# Patient Record
Sex: FEMALE | Race: ASIAN | NOT HISPANIC OR LATINO
[De-identification: names, ages, dates, MRNs, and addresses within clinical notes are randomized per-mention and may not be internally consistent; named-entity substitution may affect disease eponyms.]

---

## 2018-04-10 PROBLEM — Z00.00 ENCOUNTER FOR PREVENTIVE HEALTH EXAMINATION: Status: ACTIVE | Noted: 2018-04-10

## 2018-05-08 ENCOUNTER — APPOINTMENT (OUTPATIENT)
Dept: OPHTHALMOLOGY | Facility: CLINIC | Age: 58
End: 2018-05-08
Payer: MEDICAID

## 2018-05-08 DIAGNOSIS — Z78.9 OTHER SPECIFIED HEALTH STATUS: ICD-10-CM

## 2018-05-08 DIAGNOSIS — E11.3293 TYPE 2 DIABETES MELLITUS WITH MILD NONPROLIFERATIVE DIABETIC RETINOPATHY WITHOUT MACULAR EDEMA, BILATERAL: ICD-10-CM

## 2018-05-08 DIAGNOSIS — H40.003 PREGLAUCOMA, UNSPECIFIED, BILATERAL: ICD-10-CM

## 2018-05-08 DIAGNOSIS — I10 ESSENTIAL (PRIMARY) HYPERTENSION: ICD-10-CM

## 2018-05-08 DIAGNOSIS — H04.123 DRY EYE SYNDROME OF BILATERAL LACRIMAL GLANDS: ICD-10-CM

## 2018-05-08 PROCEDURE — 92004 COMPRE OPH EXAM NEW PT 1/>: CPT

## 2018-05-08 PROCEDURE — 92250 FUNDUS PHOTOGRAPHY W/I&R: CPT

## 2018-07-26 ENCOUNTER — APPOINTMENT (OUTPATIENT)
Dept: OPHTHALMOLOGY | Facility: CLINIC | Age: 58
End: 2018-07-26

## 2018-08-21 ENCOUNTER — APPOINTMENT (OUTPATIENT)
Dept: OPHTHALMOLOGY | Facility: CLINIC | Age: 58
End: 2018-08-21

## 2019-03-28 ENCOUNTER — APPOINTMENT (OUTPATIENT)
Dept: OPHTHALMOLOGY | Facility: CLINIC | Age: 59
End: 2019-03-28

## 2019-03-29 ENCOUNTER — APPOINTMENT (OUTPATIENT)
Dept: NEUROLOGY | Facility: CLINIC | Age: 59
End: 2019-03-29

## 2020-03-24 ENCOUNTER — APPOINTMENT (OUTPATIENT)
Dept: OPHTHALMOLOGY | Facility: CLINIC | Age: 60
End: 2020-03-24

## 2020-06-11 ENCOUNTER — APPOINTMENT (OUTPATIENT)
Dept: NEUROLOGY | Facility: CLINIC | Age: 60
End: 2020-06-11

## 2020-08-27 ENCOUNTER — INPATIENT (INPATIENT)
Facility: HOSPITAL | Age: 60
LOS: 7 days | Discharge: HOME | End: 2020-09-04
Attending: HOSPITALIST | Admitting: HOSPITALIST
Payer: MEDICAID

## 2020-08-27 ENCOUNTER — TRANSCRIPTION ENCOUNTER (OUTPATIENT)
Age: 60
End: 2020-08-27

## 2020-08-27 VITALS
SYSTOLIC BLOOD PRESSURE: 184 MMHG | OXYGEN SATURATION: 100 % | RESPIRATION RATE: 14 BRPM | TEMPERATURE: 98 F | DIASTOLIC BLOOD PRESSURE: 79 MMHG | HEART RATE: 95 BPM

## 2020-08-27 DIAGNOSIS — L08.9 LOCAL INFECTION OF THE SKIN AND SUBCUTANEOUS TISSUE, UNSPECIFIED: ICD-10-CM

## 2020-08-27 DIAGNOSIS — E78.5 HYPERLIPIDEMIA, UNSPECIFIED: ICD-10-CM

## 2020-08-27 DIAGNOSIS — R63.8 OTHER SYMPTOMS AND SIGNS CONCERNING FOOD AND FLUID INTAKE: ICD-10-CM

## 2020-08-27 DIAGNOSIS — R00.0 TACHYCARDIA, UNSPECIFIED: ICD-10-CM

## 2020-08-27 DIAGNOSIS — I10 ESSENTIAL (PRIMARY) HYPERTENSION: ICD-10-CM

## 2020-08-27 DIAGNOSIS — E11.9 TYPE 2 DIABETES MELLITUS WITHOUT COMPLICATIONS: ICD-10-CM

## 2020-08-27 DIAGNOSIS — I73.9 PERIPHERAL VASCULAR DISEASE, UNSPECIFIED: ICD-10-CM

## 2020-08-27 DIAGNOSIS — Z29.9 ENCOUNTER FOR PROPHYLACTIC MEASURES, UNSPECIFIED: ICD-10-CM

## 2020-08-27 LAB
ALBUMIN SERPL ELPH-MCNC: 4.1 G/DL — SIGNIFICANT CHANGE UP (ref 3.3–5)
ALP SERPL-CCNC: 78 U/L — SIGNIFICANT CHANGE UP (ref 40–120)
ALT FLD-CCNC: 23 U/L — SIGNIFICANT CHANGE UP (ref 4–33)
ANION GAP SERPL CALC-SCNC: 13 MMO/L — SIGNIFICANT CHANGE UP (ref 7–14)
AST SERPL-CCNC: 20 U/L — SIGNIFICANT CHANGE UP (ref 4–32)
BASOPHILS # BLD AUTO: 0.06 K/UL — SIGNIFICANT CHANGE UP (ref 0–0.2)
BASOPHILS NFR BLD AUTO: 0.7 % — SIGNIFICANT CHANGE UP (ref 0–2)
BILIRUB SERPL-MCNC: 0.4 MG/DL — SIGNIFICANT CHANGE UP (ref 0.2–1.2)
BLD GP AB SCN SERPL QL: NEGATIVE — SIGNIFICANT CHANGE UP
BUN SERPL-MCNC: 13 MG/DL — SIGNIFICANT CHANGE UP (ref 7–23)
CALCIUM SERPL-MCNC: 9.2 MG/DL — SIGNIFICANT CHANGE UP (ref 8.4–10.5)
CHLORIDE SERPL-SCNC: 104 MMOL/L — SIGNIFICANT CHANGE UP (ref 98–107)
CHOLEST SERPL-MCNC: 118 MG/DL — LOW (ref 120–199)
CO2 SERPL-SCNC: 24 MMOL/L — SIGNIFICANT CHANGE UP (ref 22–31)
CREAT SERPL-MCNC: 0.74 MG/DL — SIGNIFICANT CHANGE UP (ref 0.5–1.3)
CRP SERPL HS-MCNC: 2.49 MG/L — SIGNIFICANT CHANGE UP
EOSINOPHIL # BLD AUTO: 0.2 K/UL — SIGNIFICANT CHANGE UP (ref 0–0.5)
EOSINOPHIL NFR BLD AUTO: 2.3 % — SIGNIFICANT CHANGE UP (ref 0–6)
ERYTHROCYTE [SEDIMENTATION RATE] IN BLOOD: 34 MM/HR — HIGH (ref 4–25)
GLUCOSE BLDC GLUCOMTR-MCNC: 178 MG/DL — HIGH (ref 70–99)
GLUCOSE BLDC GLUCOMTR-MCNC: 240 MG/DL — HIGH (ref 70–99)
GLUCOSE SERPL-MCNC: 211 MG/DL — HIGH (ref 70–99)
HBA1C BLD-MCNC: 9.5 % — HIGH (ref 4–5.6)
HCT VFR BLD CALC: 39.1 % — SIGNIFICANT CHANGE UP (ref 34.5–45)
HDLC SERPL-MCNC: 36 MG/DL — LOW (ref 45–65)
HGB BLD-MCNC: 13.3 G/DL — SIGNIFICANT CHANGE UP (ref 11.5–15.5)
IMM GRANULOCYTES NFR BLD AUTO: 0.5 % — SIGNIFICANT CHANGE UP (ref 0–1.5)
INR BLD: 1.13 — SIGNIFICANT CHANGE UP (ref 0.88–1.16)
LIPID PNL WITH DIRECT LDL SERPL: 65 MG/DL — SIGNIFICANT CHANGE UP
LYMPHOCYTES # BLD AUTO: 2.76 K/UL — SIGNIFICANT CHANGE UP (ref 1–3.3)
LYMPHOCYTES # BLD AUTO: 32 % — SIGNIFICANT CHANGE UP (ref 13–44)
MCHC RBC-ENTMCNC: 28.6 PG — SIGNIFICANT CHANGE UP (ref 27–34)
MCHC RBC-ENTMCNC: 34 % — SIGNIFICANT CHANGE UP (ref 32–36)
MCV RBC AUTO: 84.1 FL — SIGNIFICANT CHANGE UP (ref 80–100)
MONOCYTES # BLD AUTO: 0.55 K/UL — SIGNIFICANT CHANGE UP (ref 0–0.9)
MONOCYTES NFR BLD AUTO: 6.4 % — SIGNIFICANT CHANGE UP (ref 2–14)
NEUTROPHILS # BLD AUTO: 5.02 K/UL — SIGNIFICANT CHANGE UP (ref 1.8–7.4)
NEUTROPHILS NFR BLD AUTO: 58.1 % — SIGNIFICANT CHANGE UP (ref 43–77)
NRBC # FLD: 0 K/UL — SIGNIFICANT CHANGE UP (ref 0–0)
PLATELET # BLD AUTO: 318 K/UL — SIGNIFICANT CHANGE UP (ref 150–400)
PMV BLD: 9.9 FL — SIGNIFICANT CHANGE UP (ref 7–13)
POTASSIUM SERPL-MCNC: 3.5 MMOL/L — SIGNIFICANT CHANGE UP (ref 3.5–5.3)
POTASSIUM SERPL-SCNC: 3.5 MMOL/L — SIGNIFICANT CHANGE UP (ref 3.5–5.3)
PROT SERPL-MCNC: 7.8 G/DL — SIGNIFICANT CHANGE UP (ref 6–8.3)
PROTHROM AB SERPL-ACNC: 12.8 SEC — SIGNIFICANT CHANGE UP (ref 10.6–13.6)
RBC # BLD: 4.65 M/UL — SIGNIFICANT CHANGE UP (ref 3.8–5.2)
RBC # FLD: 13.2 % — SIGNIFICANT CHANGE UP (ref 10.3–14.5)
RH IG SCN BLD-IMP: POSITIVE — SIGNIFICANT CHANGE UP
SARS-COV-2 RNA SPEC QL NAA+PROBE: SIGNIFICANT CHANGE UP
SODIUM SERPL-SCNC: 141 MMOL/L — SIGNIFICANT CHANGE UP (ref 135–145)
TRIGL SERPL-MCNC: 156 MG/DL — HIGH (ref 10–149)
TSH SERPL-MCNC: 3.99 UIU/ML — SIGNIFICANT CHANGE UP (ref 0.27–4.2)
WBC # BLD: 8.63 K/UL — SIGNIFICANT CHANGE UP (ref 3.8–10.5)
WBC # FLD AUTO: 8.63 K/UL — SIGNIFICANT CHANGE UP (ref 3.8–10.5)

## 2020-08-27 PROCEDURE — 71045 X-RAY EXAM CHEST 1 VIEW: CPT | Mod: 26

## 2020-08-27 PROCEDURE — 73620 X-RAY EXAM OF FOOT: CPT | Mod: 26,LT

## 2020-08-27 PROCEDURE — 99284 EMERGENCY DEPT VISIT MOD MDM: CPT

## 2020-08-27 PROCEDURE — 99223 1ST HOSP IP/OBS HIGH 75: CPT

## 2020-08-27 RX ORDER — DEXTROSE 50 % IN WATER 50 %
25 SYRINGE (ML) INTRAVENOUS ONCE
Refills: 0 | Status: DISCONTINUED | OUTPATIENT
Start: 2020-08-27 | End: 2020-09-04

## 2020-08-27 RX ORDER — INSULIN GLARGINE 100 [IU]/ML
50 INJECTION, SOLUTION SUBCUTANEOUS AT BEDTIME
Refills: 0 | Status: DISCONTINUED | OUTPATIENT
Start: 2020-08-27 | End: 2020-08-31

## 2020-08-27 RX ORDER — GLUCAGON INJECTION, SOLUTION 0.5 MG/.1ML
1 INJECTION, SOLUTION SUBCUTANEOUS ONCE
Refills: 0 | Status: DISCONTINUED | OUTPATIENT
Start: 2020-08-27 | End: 2020-09-04

## 2020-08-27 RX ORDER — LISINOPRIL 2.5 MG/1
5 TABLET ORAL EVERY 24 HOURS
Refills: 0 | Status: DISCONTINUED | OUTPATIENT
Start: 2020-08-27 | End: 2020-08-27

## 2020-08-27 RX ORDER — ASPIRIN/CALCIUM CARB/MAGNESIUM 324 MG
1 TABLET ORAL
Qty: 0 | Refills: 0 | DISCHARGE

## 2020-08-27 RX ORDER — CLOPIDOGREL BISULFATE 75 MG/1
1 TABLET, FILM COATED ORAL
Qty: 0 | Refills: 0 | DISCHARGE

## 2020-08-27 RX ORDER — CLOPIDOGREL BISULFATE 75 MG/1
75 TABLET, FILM COATED ORAL DAILY
Refills: 0 | Status: DISCONTINUED | OUTPATIENT
Start: 2020-08-27 | End: 2020-09-04

## 2020-08-27 RX ORDER — ENOXAPARIN SODIUM 100 MG/ML
40 INJECTION SUBCUTANEOUS DAILY
Refills: 0 | Status: DISCONTINUED | OUTPATIENT
Start: 2020-08-27 | End: 2020-09-04

## 2020-08-27 RX ORDER — VANCOMYCIN HCL 1 G
1000 VIAL (EA) INTRAVENOUS EVERY 12 HOURS
Refills: 0 | Status: DISCONTINUED | OUTPATIENT
Start: 2020-08-28 | End: 2020-08-31

## 2020-08-27 RX ORDER — SIMVASTATIN 20 MG/1
1 TABLET, FILM COATED ORAL
Qty: 0 | Refills: 0 | DISCHARGE

## 2020-08-27 RX ORDER — CEFEPIME 1 G/1
1000 INJECTION, POWDER, FOR SOLUTION INTRAMUSCULAR; INTRAVENOUS ONCE
Refills: 0 | Status: COMPLETED | OUTPATIENT
Start: 2020-08-27 | End: 2020-08-27

## 2020-08-27 RX ORDER — SIMVASTATIN 20 MG/1
40 TABLET, FILM COATED ORAL AT BEDTIME
Refills: 0 | Status: DISCONTINUED | OUTPATIENT
Start: 2020-08-27 | End: 2020-08-31

## 2020-08-27 RX ORDER — DEXTROSE 50 % IN WATER 50 %
12.5 SYRINGE (ML) INTRAVENOUS ONCE
Refills: 0 | Status: DISCONTINUED | OUTPATIENT
Start: 2020-08-27 | End: 2020-09-04

## 2020-08-27 RX ORDER — LOSARTAN POTASSIUM 100 MG/1
100 TABLET, FILM COATED ORAL EVERY 24 HOURS
Refills: 0 | Status: DISCONTINUED | OUTPATIENT
Start: 2020-08-27 | End: 2020-09-04

## 2020-08-27 RX ORDER — COLLAGENASE CLOSTRIDIUM HIST. 250 UNIT/G
1 OINTMENT (GRAM) TOPICAL DAILY
Refills: 0 | Status: DISCONTINUED | OUTPATIENT
Start: 2020-08-27 | End: 2020-09-04

## 2020-08-27 RX ORDER — LOSARTAN POTASSIUM 100 MG/1
1 TABLET, FILM COATED ORAL
Qty: 0 | Refills: 0 | DISCHARGE

## 2020-08-27 RX ORDER — ACETAMINOPHEN 500 MG
650 TABLET ORAL EVERY 6 HOURS
Refills: 0 | Status: DISCONTINUED | OUTPATIENT
Start: 2020-08-27 | End: 2020-09-04

## 2020-08-27 RX ORDER — INSULIN LISPRO 100/ML
VIAL (ML) SUBCUTANEOUS
Refills: 0 | Status: DISCONTINUED | OUTPATIENT
Start: 2020-08-27 | End: 2020-08-30

## 2020-08-27 RX ORDER — AMLODIPINE BESYLATE 2.5 MG/1
2.5 TABLET ORAL DAILY
Refills: 0 | Status: DISCONTINUED | OUTPATIENT
Start: 2020-08-27 | End: 2020-08-27

## 2020-08-27 RX ORDER — AMLODIPINE BESYLATE 2.5 MG/1
2.5 TABLET ORAL EVERY 24 HOURS
Refills: 0 | Status: DISCONTINUED | OUTPATIENT
Start: 2020-08-27 | End: 2020-09-02

## 2020-08-27 RX ORDER — LOSARTAN POTASSIUM 100 MG/1
100 TABLET, FILM COATED ORAL DAILY
Refills: 0 | Status: DISCONTINUED | OUTPATIENT
Start: 2020-08-27 | End: 2020-08-27

## 2020-08-27 RX ORDER — VANCOMYCIN HCL 1 G
1000 VIAL (EA) INTRAVENOUS ONCE
Refills: 0 | Status: COMPLETED | OUTPATIENT
Start: 2020-08-27 | End: 2020-08-27

## 2020-08-27 RX ORDER — ASPIRIN/CALCIUM CARB/MAGNESIUM 324 MG
81 TABLET ORAL DAILY
Refills: 0 | Status: DISCONTINUED | OUTPATIENT
Start: 2020-08-27 | End: 2020-09-04

## 2020-08-27 RX ORDER — SODIUM CHLORIDE 9 MG/ML
1000 INJECTION, SOLUTION INTRAVENOUS
Refills: 0 | Status: DISCONTINUED | OUTPATIENT
Start: 2020-08-27 | End: 2020-09-04

## 2020-08-27 RX ORDER — INSULIN GLARGINE 100 [IU]/ML
50 INJECTION, SOLUTION SUBCUTANEOUS EVERY MORNING
Refills: 0 | Status: DISCONTINUED | OUTPATIENT
Start: 2020-08-28 | End: 2020-08-30

## 2020-08-27 RX ORDER — CEFEPIME 1 G/1
2000 INJECTION, POWDER, FOR SOLUTION INTRAMUSCULAR; INTRAVENOUS EVERY 8 HOURS
Refills: 0 | Status: DISCONTINUED | OUTPATIENT
Start: 2020-08-27 | End: 2020-08-31

## 2020-08-27 RX ORDER — DEXTROSE 50 % IN WATER 50 %
15 SYRINGE (ML) INTRAVENOUS ONCE
Refills: 0 | Status: DISCONTINUED | OUTPATIENT
Start: 2020-08-27 | End: 2020-09-04

## 2020-08-27 RX ADMIN — Medication 650 MILLIGRAM(S): at 23:24

## 2020-08-27 RX ADMIN — CEFEPIME 100 MILLIGRAM(S): 1 INJECTION, POWDER, FOR SOLUTION INTRAMUSCULAR; INTRAVENOUS at 23:24

## 2020-08-27 RX ADMIN — Medication 4: at 19:42

## 2020-08-27 RX ADMIN — AMLODIPINE BESYLATE 2.5 MILLIGRAM(S): 2.5 TABLET ORAL at 19:42

## 2020-08-27 RX ADMIN — INSULIN GLARGINE 50 UNIT(S): 100 INJECTION, SOLUTION SUBCUTANEOUS at 23:24

## 2020-08-27 RX ADMIN — SIMVASTATIN 40 MILLIGRAM(S): 20 TABLET, FILM COATED ORAL at 23:25

## 2020-08-27 RX ADMIN — CEFEPIME 100 MILLIGRAM(S): 1 INJECTION, POWDER, FOR SOLUTION INTRAMUSCULAR; INTRAVENOUS at 16:58

## 2020-08-27 RX ADMIN — LOSARTAN POTASSIUM 100 MILLIGRAM(S): 100 TABLET, FILM COATED ORAL at 19:41

## 2020-08-27 RX ADMIN — Medication 250 MILLIGRAM(S): at 18:03

## 2020-08-27 NOTE — ED PROVIDER NOTE - CLINICAL SUMMARY MEDICAL DECISION MAKING FREE TEXT BOX
60 y/o F hx of HTN, HLD, PAD, type 2 DM presents to the ED c/o worsening left second toe infection/pain. Will obtain labs, x-ray, COVID testing and admit to hospital for treatment.

## 2020-08-27 NOTE — H&P ADULT - PROBLEM SELECTOR PLAN 1
Ongoing L 2nd toe foot ulcer. Now with worsening pain and clear/blood tinged drainage. Afebrile. No leukcytosis. CRP negative. ESR 34. Xray with 2nd toe soft tissue swelling with focal ulceration at tip. In addition, eroded and indistinct underlying 2nd distal phalangeal tuft cortical margins consistent with radiographic changes of osteomyelitis. No tracking gas collections beyond this region and no additional focal areas of osteomyelitis. S/p vanc/cefepime in the ED.   - Podiatry assessed the patient, recommending:      - JONAH/PVR     - IV antibiotics      - Med clearance   - C/w vanc/cefepime for now   - Medical clearance pending: RCRI score 1 - class II risk, 6.0% risk of death, MI or cardiac arrest. Brown score 1.6%. Can complete <4 METS 2/2 pain. No chest pain or SOB with exertion. no prior surgeries in the past. Medical clearance pending EKG and echo given extensive vascular history.

## 2020-08-27 NOTE — CONSULT NOTE ADULT - SUBJECTIVE AND OBJECTIVE BOX
Podiatry pager #: [phone]/ [phone]    Patient is a 59y old  Female who presents with a chief complaint of     HPI:      PAST MEDICAL & SURGICAL HISTORY:  DM (diabetes mellitus), type 2  HLD (hyperlipidemia)  HTN (hypertension)  PAD (peripheral artery disease)  No significant past surgical history      MEDICATIONS  (STANDING):  vancomycin  IVPB. 1000 milliGRAM(s) IV Intermittent once    MEDICATIONS  (PRN):      Allergies    No Known Allergies    Intolerances        VITALS:    Vital Signs Last 24 Hrs  T(C): 36.7 (27 Aug 2020 14:11), Max: 36.7 (27 Aug 2020 14:11)  T(F): 98 (27 Aug 2020 14:11), Max: 98 (27 Aug 2020 14:11)  HR: 110 (27 Aug 2020 17:25) (95 - 110)  BP: 189/104 (27 Aug 2020 17:25) (184/79 - 189/104)  BP(mean): --  RR: 16 (27 Aug 2020 17:25) (14 - 16)  SpO2: 100% (27 Aug 2020 17:25) (100% - 100%)    LABS:                          13.3   8.63  )-----------( 318      ( 27 Aug 2020 15:40 )             39.1       08-27    141  |  104  |  13  ----------------------------<  211<H>  3.5   |  24  |  0.74    Ca    9.2      27 Aug 2020 15:40    TPro  7.8  /  Alb  4.1  /  TBili  0.4  /  DBili  x   /  AST  20  /  ALT  23  /  AlkPhos  78  08-27      CAPILLARY BLOOD GLUCOSE      POCT Blood Glucose.: 230 mg/dL (27 Aug 2020 14:18)      PT/INR - ( 27 Aug 2020 16:46 )   PT: 12.8 SEC;   INR: 1.13              LOWER EXTREMITY PHYSICAL EXAM:    Vascular: DP 1/4, PT NP, B/L,    LF second digit fibrotic wound to bone, w/ undermining, scant purulence noted. No periwound erythema, no malodor, etiology likely ischemic. Wound is tender to touch.     RADIOLOGY & ADDITIONAL STUDIES:    < from: Xray Foot AP + Lateral, Left (08.27.20 @ 15:32) >  EXAM:  RAD FOOT 2 VIEWS LEFT        PROCEDURE DATE:  Aug 27 2020         INTERPRETATION:  CLINICAL INDICATION: left 2nd toe infection    EXAM:  Frontal and lateral left foot from 8/27/2020 at 1532. No similar prior studies available for comparison.    IMPRESSION:  2nd toe soft tissue swelling with focal ulceration at tip. In addition, eroded and indistinct underlying 2nd distal phalangeal tuft cortical margins consistent with radiographic changes of osteomyelitis. No tracking gas collections beyond this region and no additional focal areas of osteomyelitis.    No fractures or dislocations.    Tarsometatarsal alignment maintained without evidence for a Lisfranc injury.    Preserved joint spaces and no joint margin erosions.    Osteopenic appearing osseous structures, however, bone mineralization more accurately assessed with densitometry. Otherwise no discrete suspicious lytic or blastic lesions.                BOBBY OLIVARES M.D., ATTENDING RADIOLOGIST  This document has been electronically signed. Aug 27 2020  4:29PM              < end of copied text >

## 2020-08-27 NOTE — PATIENT PROFILE ADULT - TELEPHONIC ID NUMBER OF THE INTERPRETER
Ambulated to discharge area without difficulty. Strong on transfer and denied complaint.
Discussed with her the bradycardia she presents with at this visit. She recently had a coronary stent placed with her cardiologist. Olu Thayerggy her to contact her doctor and let him know her heart rate is consistently between 47- 52 during her visit here today. She will ask if any further consideration or medication adjustment will be necessary.
Patient arrived in Post op phase 2 on cart. Report from Laura Martin RN. Site of injection without redness, drainage or bleeding. Patient denies numbness, tingling or weakness. Moves extremities x 4.
[id number]

## 2020-08-27 NOTE — H&P ADULT - NSHPPHYSICALEXAM_GEN_ALL_CORE
.  VITAL SIGNS:  T(C): 36.8 (08-27-20 @ 17:25), Max: 36.8 (08-27-20 @ 17:25)  T(F): 98.3 (08-27-20 @ 17:25), Max: 98.3 (08-27-20 @ 17:25)  HR: 110 (08-27-20 @ 17:25) (95 - 110)  BP: 189/104 (08-27-20 @ 17:25) (184/79 - 189/104)  BP(mean): --  RR: 16 (08-27-20 @ 17:25) (14 - 16)  SpO2: 100% (08-27-20 @ 17:25) (100% - 100%)  Wt(kg): --    PHYSICAL EXAM:    Constitutional: WDWN female resting comfortably in bed; NAD  Head: NC/AT  Eyes: PERRL, EOMI, anicteric sclera  ENT: no nasal discharge; uvula midline, no oropharyngeal erythema or exudates; MMM  Neck: supple; no JVD or thyromegaly  Respiratory: CTA B/L; no W/R/R, no retractions  Cardiac: +S1/S2 +S4; tachycardic regular rhythm; no M/R/G; PMI non-displaced  Gastrointestinal: abdomen soft, NT/ND; no rebound or guarding; +BSx4  Back: spine midline, no bony tenderness or step-offs; no CVAT B/L  Extremities: WWP, no clubbing or cyanosis; no peripheral edema  Musculoskeletal: NROM x4; no joint swelling, tenderness or erythema  Vascular: DP pulses diminished bilaterally. Left foot/toe wrapped. No purulence noted on the gauze  Dermatologic: skin warm, dry and intact; no rashes, wounds, or scars  Lymphatic: no submandibular or cervical LAD  Neurologic: AAOx3; CNII-XII grossly intact; no focal deficits  Psychiatric: affect and characteristics of appearance, verbalizations, behaviors are appropriate

## 2020-08-27 NOTE — H&P ADULT - PROBLEM SELECTOR PLAN 3
Hx of DM. A1C unknown. On Lantus 100U Qhs and metformin 1000mg BID at home.   - Hold metformin   - Lantus 50U BID   - F/u A1C

## 2020-08-27 NOTE — CONSULT NOTE ADULT - ASSESSMENT
Pt is 60 yo who presents w/ LF second digit wound to bone  -pt was seen and evaluated   -Afebrile, no leukocytosis  -LF second digit fibrotic wound to bone, w/ undermining, scant purulence noted. No periwound erythema, no malodor, etiology likely ischemic. Wound is tender to touch.   -Xray: OM to distal phallanx of left second digit  -Rec admit for IV abx  -JONAH/PVR ordered, vasc consult recommended pending JONAH/PVR  -Please document medical clearance/ optimization for LF surgery under local and light sedation  -Discussed w/ attending

## 2020-08-27 NOTE — ED PROVIDER NOTE - ATTENDING CONTRIBUTION TO CARE
I performed a face to face evaluation of this patient and obtained a history and performed a full exam.  I agree with the history, physical exam and plan of the PA.    Brief HPI:  60 y/o F hx of HTN, HLD, PAD, type 2 DM presents to the ED c/o worsening left second toe infection/pain.    Vitals:   Reviewed    Exam:    GEN:  Non-toxic appearing, non-distressed, speaking full sentences, non-diaphoretic, AAOx3  HEENT:  NCAT, neck supple, EOMI, PERRLA, sclera anicteric, no conjunctival pallor or injection, no stridor, normal voice, no tonsillar exudate, uvula midline, tympanic membranes and external auditory canals normal appearing bilaterally   CV:  regular rhythm and rate, s1/s2 audible, no murmurs, rubs or gallops, peripheral pulses 2+ and symmetric  PULM:  non-labored respirations, lungs clear to auscultation bilaterally, no wheezes, crackles or rales  ABD:  non distended, non-tender, no rebound, no guarding, negative Choi's sign, bowel sounds normal, no cvat  MSK:  swollen warm/ecchymotic left second distal phalanges of toe, purulent drainage and erosion of nailbed  NEURO:  AAOx3, CN II-XII intact, motor 5/5 in upper and lower extremities bilaterally, sensation grossly intact in extremities and trunk, finger to nose testing wnl, no nystagmus, negative Romberg, no pronator drift, no gait deficit  SKIN:  warm, dry, no rash or vesicles     A/P:  60 y/o F hx of HTN, HLD, PAD, type 2 DM presents to the ED c/o worsening left second toe infection/pain.  VSS AF.  Exam not c/w necrotizing infection.  Will send labs, abx, admit.

## 2020-08-27 NOTE — H&P ADULT - NSICDXPASTMEDICALHX_GEN_ALL_CORE_FT
PAST MEDICAL HISTORY:  DM (diabetes mellitus), type 2     HLD (hyperlipidemia)     HTN (hypertension)     PAD (peripheral artery disease)

## 2020-08-27 NOTE — ED ADULT TRIAGE NOTE - CHIEF COMPLAINT QUOTE
Pt presents to ED for pain to 2nd toe on left foot x2days. 2nd toe observed to be wrapped at this time, as per daughter pt has a wound to this toe and has been seeing a podiatrist for antibiotic treatment. Dressing observed to be dry and intact at this. Pt unable to sleep and walk well on foot due to pain. Pmhx of DM, HTN & high cholesterol.

## 2020-08-27 NOTE — ED ADULT NURSE NOTE - OBJECTIVE STATEMENT
Pt presenting to intake AxOx4 ambulatory at baseline with c.o pain to second toe of left foot. PMH of DM. Pt recently received abx for toe infection, symptoms not improving. RR even and unlabored. Palor/diaphoresis not noted. IV established with 20g in LAC. Labs drawn and sent. MD at bedside, will continue to monitor.

## 2020-08-27 NOTE — H&P ADULT - HISTORY OF PRESENT ILLNESS
60 y/o F with PMH HTN, HLD, DM2, PAD s/p 2 stents in left leg with left 2nd toe ulcer who presents for worsening left 2nd toe pain. History is obtained from the patient with an  and from the daughter. She recently had 2 stents placed in the left leg in April. She had an ulcer of the left toe that had not caused her pain but in the past month or so she has had increasing left toe pain. She visited a doctor weekly that examined the toe and did xrays. The family said the xray did not show anything. She now has worsening pain. Has had some clear drainage from the toe and small streaks of blood. No purulent or malodorous fluid. Denies numbness/tingling, weakness of the bilateral lower extremities. No fevers/chills, fatigue. All other ROS negative.     In the ED, VS : SBP 180s, . No EKG performed. No medication administered. Xray of left foot showing possible osteo of the left 2nd toe. Received vanc/zosyn. Podiatry consulted. 58 y/o F with PMH HTN, HLD, DM2, PAD s/p 2 stents in left leg with left 2nd toe ulcer who presents for worsening left 2nd toe pain. History is obtained from the patient with an  and from the daughter. She recently had 2 stents placed in the left leg in April. She had an ulcer of the left toe that had not caused her pain but in the past month or so she has had increasing left toe pain. She visited a doctor weekly that examined the toe and did xrays. The family said the xray did not show anything. She now has worsening pain. Has had some clear drainage from the toe and small streaks of blood. No purulent or malodorous fluid. Denies numbness/tingling, weakness of the bilateral lower extremities. No fevers/chills, fatigue. All other ROS negative.     In the ED, VS : SBP 180s, . No EKG performed. Xray of left foot showing possible osteo of the left 2nd toe. Received vanc/cefepime. Podiatry consulted.

## 2020-08-27 NOTE — H&P ADULT - NSHPLABSRESULTS_GEN_ALL_CORE
.  LABS:                         13.3   8.63  )-----------( 318      ( 27 Aug 2020 15:40 )             39.1     08-27    141  |  104  |  13  ----------------------------<  211<H>  3.5   |  24  |  0.74    Ca    9.2      27 Aug 2020 15:40    TPro  7.8  /  Alb  4.1  /  TBili  0.4  /  DBili  x   /  AST  20  /  ALT  23  /  AlkPhos  78  08-27    PT/INR - ( 27 Aug 2020 16:46 )   PT: 12.8 SEC;   INR: 1.13                        RADIOLOGY, EKG & ADDITIONAL TESTS: Reviewed.

## 2020-08-27 NOTE — H&P ADULT - PROBLEM SELECTOR PLAN 4
Hx of HTN. SBP 180s currently. S4 heard on cardiac exam. Not on an ACE inhibitor.   - C/w norvasc 2.5mg qd  - Start lisinopril 5mg qd

## 2020-08-27 NOTE — H&P ADULT - PROBLEM SELECTOR PLAN 6
HR elevated to 100s while interviewing the patient. In the ED was 80s-90s. No EKG in the chart.   - F/u EKG

## 2020-08-27 NOTE — ED PROVIDER NOTE - OBJECTIVE STATEMENT
58 y/o F hx of HTN, HLD, PAD, type 2 DM presents to the ED c/o worsening left second toe infection/pain. Pt has ongoing infection/issue to left second toe for 6 months. Pt saw podiatry and was sent to vascular surgery. Placed 3 stents in left lower extremity in April. Taking Asprin. Chronic wound to distal aspect of left toe. Went to Urgent Care one week ago and started on antibiotics. Pt unable to recall name of antibiotics but has been taking it for one week with worsening wound dehiscence and swelling of toe. Denies fever, chills, chest pain, SOB, cough.

## 2020-08-27 NOTE — ED PROVIDER NOTE - SKIN WOUND DESCRIPTION
swollen warm/ecchymotic left second distal phalanges of toe, purulent drainage and erosion of nailbed

## 2020-08-27 NOTE — H&P ADULT - ASSESSMENT
60 y/o F with PMH HTN, HLD, DM2, PAD s/p 2 stents in left leg with left 2nd toe ulcer who presents for worsening left 2nd toe pain with likely osteomyelitis.

## 2020-08-27 NOTE — ED PROVIDER NOTE - PMH
DM (diabetes mellitus), type 2    HLD (hyperlipidemia)    HTN (hypertension)    PAD (peripheral artery disease) 5

## 2020-08-28 LAB
GLUCOSE BLDC GLUCOMTR-MCNC: 131 MG/DL — HIGH (ref 70–99)
GLUCOSE BLDC GLUCOMTR-MCNC: 153 MG/DL — HIGH (ref 70–99)
GLUCOSE BLDC GLUCOMTR-MCNC: 165 MG/DL — HIGH (ref 70–99)
GLUCOSE BLDC GLUCOMTR-MCNC: 226 MG/DL — HIGH (ref 70–99)
GLUCOSE BLDC GLUCOMTR-MCNC: 243 MG/DL — HIGH (ref 70–99)
HBA1C BLD-MCNC: 9.6 % — HIGH (ref 4–5.6)
HCV AB S/CO SERPL IA: 0.16 S/CO — SIGNIFICANT CHANGE UP (ref 0–0.99)
HCV AB SERPL-IMP: SIGNIFICANT CHANGE UP

## 2020-08-28 PROCEDURE — 93010 ELECTROCARDIOGRAM REPORT: CPT

## 2020-08-28 PROCEDURE — 99253 IP/OBS CNSLTJ NEW/EST LOW 45: CPT

## 2020-08-28 PROCEDURE — 93306 TTE W/DOPPLER COMPLETE: CPT | Mod: 26

## 2020-08-28 PROCEDURE — 93923 UPR/LXTR ART STDY 3+ LVLS: CPT | Mod: 26

## 2020-08-28 PROCEDURE — 99233 SBSQ HOSP IP/OBS HIGH 50: CPT

## 2020-08-28 RX ADMIN — Medication 1 APPLICATION(S): at 09:00

## 2020-08-28 RX ADMIN — Medication 250 MILLIGRAM(S): at 18:02

## 2020-08-28 RX ADMIN — SIMVASTATIN 40 MILLIGRAM(S): 20 TABLET, FILM COATED ORAL at 21:06

## 2020-08-28 RX ADMIN — CEFEPIME 100 MILLIGRAM(S): 1 INJECTION, POWDER, FOR SOLUTION INTRAMUSCULAR; INTRAVENOUS at 21:06

## 2020-08-28 RX ADMIN — CLOPIDOGREL BISULFATE 75 MILLIGRAM(S): 75 TABLET, FILM COATED ORAL at 13:35

## 2020-08-28 RX ADMIN — Medication 4: at 17:52

## 2020-08-28 RX ADMIN — INSULIN GLARGINE 50 UNIT(S): 100 INJECTION, SOLUTION SUBCUTANEOUS at 22:46

## 2020-08-28 RX ADMIN — CEFEPIME 100 MILLIGRAM(S): 1 INJECTION, POWDER, FOR SOLUTION INTRAMUSCULAR; INTRAVENOUS at 05:00

## 2020-08-28 RX ADMIN — AMLODIPINE BESYLATE 2.5 MILLIGRAM(S): 2.5 TABLET ORAL at 18:03

## 2020-08-28 RX ADMIN — INSULIN GLARGINE 50 UNIT(S): 100 INJECTION, SOLUTION SUBCUTANEOUS at 09:22

## 2020-08-28 RX ADMIN — Medication 2: at 12:32

## 2020-08-28 RX ADMIN — Medication 650 MILLIGRAM(S): at 00:15

## 2020-08-28 RX ADMIN — CEFEPIME 100 MILLIGRAM(S): 1 INJECTION, POWDER, FOR SOLUTION INTRAMUSCULAR; INTRAVENOUS at 13:35

## 2020-08-28 RX ADMIN — Medication 2: at 09:21

## 2020-08-28 RX ADMIN — Medication 250 MILLIGRAM(S): at 05:00

## 2020-08-28 RX ADMIN — Medication 81 MILLIGRAM(S): at 13:35

## 2020-08-28 RX ADMIN — LOSARTAN POTASSIUM 100 MILLIGRAM(S): 100 TABLET, FILM COATED ORAL at 18:02

## 2020-08-28 RX ADMIN — ENOXAPARIN SODIUM 40 MILLIGRAM(S): 100 INJECTION SUBCUTANEOUS at 13:35

## 2020-08-28 NOTE — PROGRESS NOTE ADULT - SUBJECTIVE AND OBJECTIVE BOX
Podiatry pager #: [phone] ([place])/ [phone] ([facility])    Patient is a 59y old  Female who presents with a chief complaint of      INTERVAL HPI/OVERNIGHT EVENTS:  Patient seen and evaluated at bedside.  Pt is resting comfortable in NAD. Denies N/V/F/C.     Allergies    No Known Allergies    Intolerances        Vital Signs Last 24 Hrs  T(C): 36.4 (28 Aug 2020 04:55), Max: 36.8 (27 Aug 2020 17:25)  T(F): 97.6 (28 Aug 2020 04:55), Max: 98.3 (27 Aug 2020 17:25)  HR: 90 (28 Aug 2020 04:55) (86 - 110)  BP: 160/89 (28 Aug 2020 04:55) (156/84 - 189/104)  BP(mean): --  RR: 18 (28 Aug 2020 04:55) (14 - 18)  SpO2: 100% (28 Aug 2020 04:55) (100% - 100%)    LABS:                        13.3   8.63  )-----------( 318      ( 27 Aug 2020 15:40 )             39.1     08-27    141  |  104  |  13  ----------------------------<  211<H>  3.5   |  24  |  0.74    Ca    9.2      27 Aug 2020 15:40    TPro  7.8  /  Alb  4.1  /  TBili  0.4  /  DBili  x   /  AST  20  /  ALT  23  /  AlkPhos  78  08-27    PT/INR - ( 27 Aug 2020 16:46 )   PT: 12.8 SEC;   INR: 1.13              CAPILLARY BLOOD GLUCOSE      POCT Blood Glucose.: 131 mg/dL (28 Aug 2020 02:15)  POCT Blood Glucose.: 178 mg/dL (27 Aug 2020 23:01)  POCT Blood Glucose.: 240 mg/dL (27 Aug 2020 19:14)  POCT Blood Glucose.: 230 mg/dL (27 Aug 2020 14:18)      Lower Extremity Physical Exam:  Vascular: DP 1/4, PT NP, B/L,  Neuro: Epicritic sensation intact to the level of the digits b/l   MSk: unremarkable  LF second digit fibronecrotic wound to bone, w/ undermining, scant purulence noted. No periwound erythema, no malodor, etiology likely ischemic. Wound is tender to touch.     RADIOLOGY & ADDITIONAL TESTS:  < from: Xray Foot AP + Lateral, Left (08.27.20 @ 15:32) >    EXAM:  RAD FOOT 2 VIEWS LEFT        PROCEDURE DATE:  Aug 27 2020         INTERPRETATION:  CLINICAL INDICATION: left 2nd toe infection    EXAM:  Frontal and lateral left foot from 8/27/2020 at 1532. No similar prior studies available for comparison.    IMPRESSION:  2nd toe soft tissue swelling with focal ulceration at tip. In addition, eroded and indistinct underlying 2nd distal phalangeal tuft cortical margins consistent with radiographic changes of osteomyelitis. No tracking gas collections beyond this region and no additional focal areas of osteomyelitis.    No fractures or dislocations.    Tarsometatarsal alignment maintained without evidence for a Lisfranc injury.    Preserved joint spaces and no joint margin erosions.    Osteopenic appearing osseous structures, however, bone mineralization more accurately assessed with densitometry. Otherwise no discrete suspicious lytic or blastic lesions.                BOBBY OLIVARES M.D., ATTENDING RADIOLOGIST  This document has been electronically signed. Aug 27 2020  4:29PM    < end of copied text >

## 2020-08-28 NOTE — CONSULT NOTE ADULT - ASSESSMENT
ASSESSMENT:  58 y/o F with PMH HTN, HLD, DM2, PAD s/p 2 stents in left leg (Angioplasty performed with Dr. Baker in [facility] in April 2020) with left 2nd toe ulcer who presents for worsening left 2nd toe pain. Foot xray with concerns for osteomyelitis. She now has JONAH/PVR that demonstrate left toe pressure of 55 and JONAH of 0.61. She had angioplasty in April 2020 at [facility] but wound worsened and was more painful starting in June 2020. We are consulted for possible need for LLE revascularization.    PLAN:    - To be discussed with Vascular Fellow and Dr. Forman    [id number] ASSESSMENT:  58 y/o F with PMH HTN, HLD, DM2, PAD s/p 2 stents in left leg (Angioplasty performed with Dr. Baker in [facility] in April 2020) with left 2nd toe ulcer who presents for worsening left 2nd toe pain. Foot xray with concerns for osteomyelitis. She now has JONAH/PVR that demonstrate left toe pressure of 55 and JONAH of 0.61. She had angioplasty in April 2020 at [facility] but wound worsened and was more painful starting in June 2020. We are consulted for possible need for LLE revascularization.    PLAN:    - Discussed with Vascular Fellow and Dr. Forman  - Please obtain angiogram records from [facility] with Dr. Eduardo Baker  - Will determine need for repeat angiogram after reviewing records  - Toe amputation planning per podiatry   - Will continue to follow     [id number]

## 2020-08-28 NOTE — PROGRESS NOTE ADULT - PROBLEM SELECTOR PLAN 6
HR elevated to 100s while interviewing the patient. In the ED was 80s-90s. No EKG in the chart.   - F/u EKG improved    In the ED was 80s-90s. No EKG in the chart.   - F/u EKG

## 2020-08-28 NOTE — CONSULT NOTE ADULT - SUBJECTIVE AND OBJECTIVE BOX
General Surgery Consult Note  Attending: Dr. Forman  Service: C Team Surgery    HPI:  60 y/o F with PMH HTN, HLD, DM2, PAD s/p 2 stents in left leg with left 2nd toe ulcer who presents for worsening left 2nd toe pain. History is obtained from the patient with an  and from the daughter. She recently had 2 stents placed in the left leg in April. She had an ulcer of the left toe that had not caused her pain but in the past month or so she has had increasing left toe pain. She visited a doctor weekly that examined the toe and did xrays. The family said the xray did not show anything. She now has worsening pain. Has had some clear drainage from the toe and small streaks of blood. No purulent or malodorous fluid. Denies numbness/tingling, weakness of the bilateral lower extremities. No fevers/chills, fatigue. All other ROS negative. In the ED, VS : SBP 180s, . No EKG performed. Xray of left foot showing possible osteo of the left 2nd toe. Received vanc/cefepime. Podiatry consulted. Vascular surgery now consult for abnormal JONAH/PVRs.          PAST MEDICAL & SURGICAL HISTORY:  DM (diabetes mellitus), type 2  HLD (hyperlipidemia)  HTN (hypertension)  PAD (peripheral artery disease)  No significant past surgical history      ALLERGIES:  Allergies    No Known Allergies    Intolerances        SOCIAL HISTORY:      FAMILY HISTORY:      PHYSICAL EXAM:  General: NAD, resting comfortably  HEENT: NC/AT, EOMI, normal hearing, no oral lesions, no LAD, neck supple  Pulmonary: normal resp effort, CTA-B  Cardiovascular: NSR, no murmurs  Abdominal: soft, ND/NT, no organomegaly  Extremities: WWP, normal strength, no clubbing/cyanosis/edema  Neuro: A/O x 3, CNs II-XII grossly intact, normal sensation, no focal deficits  Pulses: palpable distal pulses    VITAL SIGNS:  Vital Signs Last 24 Hrs  T(C): 36.9 (28 Aug 2020 13:32), Max: 36.9 (28 Aug 2020 13:32)  T(F): 98.4 (28 Aug 2020 13:32), Max: 98.4 (28 Aug 2020 13:32)  HR: 94 (28 Aug 2020 13:32) (86 - 100)  BP: 142/84 (28 Aug 2020 13:32) (142/84 - 164/75)  BP(mean): --  RR: 18 (28 Aug 2020 13:32) (17 - 18)  SpO2: 99% (28 Aug 2020 13:32) (99% - 100%)    I&O's Summary      LABS:                        13.3   8.63  )-----------( 318      ( 27 Aug 2020 15:40 )             39.1     08-27    141  |  104  |  13  ----------------------------<  211<H>  3.5   |  24  |  0.74    Ca    9.2      27 Aug 2020 15:40    TPro  7.8  /  Alb  4.1  /  TBili  0.4  /  DBili  x   /  AST  20  /  ALT  23  /  AlkPhos  78  08-27    PT/INR - ( 27 Aug 2020 16:46 )   PT: 12.8 SEC;   INR: 1.13              CAPILLARY BLOOD GLUCOSE      POCT Blood Glucose.: 243 mg/dL (28 Aug 2020 17:01)  POCT Blood Glucose.: 165 mg/dL (28 Aug 2020 12:09)  POCT Blood Glucose.: 153 mg/dL (28 Aug 2020 08:29)  POCT Blood Glucose.: 131 mg/dL (28 Aug 2020 02:15)  POCT Blood Glucose.: 178 mg/dL (27 Aug 2020 23:01)  POCT Blood Glucose.: 240 mg/dL (27 Aug 2020 19:14)    LIVER FUNCTIONS - ( 27 Aug 2020 15:40 )  Alb: 4.1 g/dL / Pro: 7.8 g/dL / ALK PHOS: 78 u/L / ALT: 23 u/L / AST: 20 u/L / GGT: x             CULTURES:      RADIOLOGY & ADDITIONAL STUDIES: General Surgery Consult Note  Attending: Dr. Forman  Service: C Team Surgery    HPI:  58 y/o F with PMH HTN, HLD, DM2, PAD s/p 2 stents in left leg (Angioplasty performed with Dr. Baker in [facility] in April 2020) with left 2nd toe ulcer who presents for worsening left 2nd toe pain. History is obtained from the patient with an  and from the daughter. She recently had 2 stents placed in the left leg in April. She had an ulcer of the left toe that had not caused her pain but in the past month or so she has had increasing left toe pain. She visited a doctor weekly that examined the toe and did xrays. The family said the xray did not show anything. She now has worsening pain. Has had some clear drainage from the toe and small streaks of blood. No purulent or malodorous fluid. Denies numbness/tingling, weakness of the bilateral lower extremities. No fevers/chills, fatigue. All other ROS negative. In the ED, VS : SBP 180s, . No EKG performed. Xray of left foot showing possible osteo of the left 2nd toe. Received vanc/cefepime. Podiatry consulted. Vascular surgery now consult for abnormal JONAH/PVRs.          PAST MEDICAL & SURGICAL HISTORY:  DM (diabetes mellitus), type 2  HLD (hyperlipidemia)  HTN (hypertension)  PAD (peripheral artery disease)  No significant past surgical history      ALLERGIES:  Allergies    No Known Allergies    Intolerances        SOCIAL HISTORY:  Denies Tobacco, ETOH    FAMILY HISTORY:      PHYSICAL EXAM:  General: NAD  HEENT: NC/AT, no scleral icterus  Pulmonary: normal resp effort, CTA-B  Cardiovascular: NSR, no murmurs  Abdominal: soft, ND/NT  Extremities: WWP, no edema  Neuro: A/O x 3, normal sensation, no focal deficits  Pulses: Palpable femoral pulses bilaterally, dopplerable PT/DP bilaterally     VITAL SIGNS:  Vital Signs Last 24 Hrs  T(C): 36.9 (28 Aug 2020 13:32), Max: 36.9 (28 Aug 2020 13:32)  T(F): 98.4 (28 Aug 2020 13:32), Max: 98.4 (28 Aug 2020 13:32)  HR: 94 (28 Aug 2020 13:32) (86 - 100)  BP: 142/84 (28 Aug 2020 13:32) (142/84 - 164/75)  BP(mean): --  RR: 18 (28 Aug 2020 13:32) (17 - 18)  SpO2: 99% (28 Aug 2020 13:32) (99% - 100%)    I&O's Summary      LABS:                        13.3   8.63  )-----------( 318      ( 27 Aug 2020 15:40 )             39.1     08-27    141  |  104  |  13  ----------------------------<  211<H>  3.5   |  24  |  0.74    Ca    9.2      27 Aug 2020 15:40    TPro  7.8  /  Alb  4.1  /  TBili  0.4  /  DBili  x   /  AST  20  /  ALT  23  /  AlkPhos  78  08-27    PT/INR - ( 27 Aug 2020 16:46 )   PT: 12.8 SEC;   INR: 1.13              CAPILLARY BLOOD GLUCOSE      POCT Blood Glucose.: 243 mg/dL (28 Aug 2020 17:01)  POCT Blood Glucose.: 165 mg/dL (28 Aug 2020 12:09)  POCT Blood Glucose.: 153 mg/dL (28 Aug 2020 08:29)  POCT Blood Glucose.: 131 mg/dL (28 Aug 2020 02:15)  POCT Blood Glucose.: 178 mg/dL (27 Aug 2020 23:01)  POCT Blood Glucose.: 240 mg/dL (27 Aug 2020 19:14)    LIVER FUNCTIONS - ( 27 Aug 2020 15:40 )  Alb: 4.1 g/dL / Pro: 7.8 g/dL / ALK PHOS: 78 u/L / ALT: 23 u/L / AST: 20 u/L / GGT: x             CULTURES:      RADIOLOGY & ADDITIONAL STUDIES:      < from: VA Duplex Physiol Ext Low 3+ Level, BI. (08.28.20 @ 13:54) >  Right Findings: The ankle-brachial index (JONAH) on the  right is mildly decreased (0.82).  The toe-brachial index (TBI) on the right is moderately  decreased (0.48).  The right toe pressure is 84 mmHg.  Pulse volume recording (PVR) waveforms are triphasic with  normal amplitudes at the right thigh and calf.  Waveforms  appear moderately diminished in amplitude at the right  ankle, metatarsal, and digit segments.  Left Findings: The ankle-brachial index (JONAH) on the left  is moderately decreased (0.61).    The toe-brachial index (TBI) on the left is moderately  decreased (0.31). The left toe pressure is 55 mmHg.    Pulse volume recording (PVR) waveforms are triphasic with  normal amplitudes at the left thigh and calf.  Waveforms  appear moderately diminished in amplitude at the left  ankle, metatarsal, and digit segments.  ------------------------------------------------------------------------  Summary/Impressions:  1.  Right JONAH is mildly decreased (0.82).  Right TBI is  moderately decreased (0.48), with a toe pressure of 84  mmHg.  Findings suggest the presence of distal  infrapopliteal arterial disease in the right lower  extremity.  2.  Left JONAH is moderately decreased (0.61). Left TBI is  moderately decreased (0.31), with a toe pressure of 55  mmHg.  Findings suggest the presence of distal  infrapopliteal arterial disease in the left lower  extremity.      < from: Xray Foot AP + Lateral, Left (08.27.20 @ 15:32) >  IMPRESSION:  2nd toe soft tissue swelling with focal ulceration at tip. In addition, eroded and indistinct underlying 2nd distal phalangeal tuft cortical margins consistent with radiographic changes of osteomyelitis. No tracking gas collections beyond this region and no additional focal areas of osteomyelitis.    No fractures or dislocations.    Tarsometatarsal alignment maintained without evidence for a Lisfranc injury.    Preserved joint spaces and no joint margin erosions.    Osteopenic appearing osseous structures, however, bone mineralization more accurately assessed with densitometry. Otherwise no discrete suspicious lytic or blastic lesions.    < end of copied text > General Surgery Consult Note  Attending: Dr. Forman  Service: C Team Surgery    HPI:  58 y/o F with PMH HTN, HLD, DM2, PAD s/p 2 stents in left leg (Angioplasty performed with Dr. Baker in [facility] in April 2020) with left 2nd toe ulcer who presents for worsening left 2nd toe pain. History is obtained from the patient with an  and from the daughter. She recently had 2 stents placed in the left leg in April. She had an ulcer of the left toe that had not caused her pain but in the past month or so she has had increasing left toe pain. She visited a doctor weekly that examined the toe and did xrays. The family said the xray did not show anything. She now has worsening pain. Has had some clear drainage from the toe and small streaks of blood. No purulent or malodorous fluid. Denies numbness/tingling, weakness of the bilateral lower extremities. No fevers/chills, fatigue. All other ROS negative. In the ED, VS : SBP 180s, . No EKG performed. Xray of left foot showing possible osteo of the left 2nd toe. Received vanc/cefepime. Podiatry consulted. Vascular surgery now consult for abnormal JONAH/PVRs.          PAST MEDICAL & SURGICAL HISTORY:  DM (diabetes mellitus), type 2  HLD (hyperlipidemia)  HTN (hypertension)  PAD (peripheral artery disease)  No significant past surgical history      ALLERGIES:  Allergies    No Known Allergies    Intolerances        SOCIAL HISTORY:  Denies Tobacco, ETOH    FAMILY HISTORY:      PHYSICAL EXAM:  General: NAD  HEENT: NC/AT, no scleral icterus  Pulmonary: normal resp effort, CTA-B  Cardiovascular: NSR, no murmurs  Abdominal: soft, ND/NT  Extremities: WWP, no edema  Neuro: A/O x 3, normal sensation, no focal deficits  Pulses: Palpable femoral pulses bilaterally, Palpable right DP pulse and dopplerable PT, PT/DP dopplerable signal on left      VITAL SIGNS:  Vital Signs Last 24 Hrs  T(C): 36.9 (28 Aug 2020 13:32), Max: 36.9 (28 Aug 2020 13:32)  T(F): 98.4 (28 Aug 2020 13:32), Max: 98.4 (28 Aug 2020 13:32)  HR: 94 (28 Aug 2020 13:32) (86 - 100)  BP: 142/84 (28 Aug 2020 13:32) (142/84 - 164/75)  BP(mean): --  RR: 18 (28 Aug 2020 13:32) (17 - 18)  SpO2: 99% (28 Aug 2020 13:32) (99% - 100%)    I&O's Summary      LABS:                        13.3   8.63  )-----------( 318      ( 27 Aug 2020 15:40 )             39.1     08-27    141  |  104  |  13  ----------------------------<  211<H>  3.5   |  24  |  0.74    Ca    9.2      27 Aug 2020 15:40    TPro  7.8  /  Alb  4.1  /  TBili  0.4  /  DBili  x   /  AST  20  /  ALT  23  /  AlkPhos  78  08-27    PT/INR - ( 27 Aug 2020 16:46 )   PT: 12.8 SEC;   INR: 1.13              CAPILLARY BLOOD GLUCOSE      POCT Blood Glucose.: 243 mg/dL (28 Aug 2020 17:01)  POCT Blood Glucose.: 165 mg/dL (28 Aug 2020 12:09)  POCT Blood Glucose.: 153 mg/dL (28 Aug 2020 08:29)  POCT Blood Glucose.: 131 mg/dL (28 Aug 2020 02:15)  POCT Blood Glucose.: 178 mg/dL (27 Aug 2020 23:01)  POCT Blood Glucose.: 240 mg/dL (27 Aug 2020 19:14)    LIVER FUNCTIONS - ( 27 Aug 2020 15:40 )  Alb: 4.1 g/dL / Pro: 7.8 g/dL / ALK PHOS: 78 u/L / ALT: 23 u/L / AST: 20 u/L / GGT: x             CULTURES:      RADIOLOGY & ADDITIONAL STUDIES:      < from: VA Duplex Physiol Ext Low 3+ Level, BI. (08.28.20 @ 13:54) >  Right Findings: The ankle-brachial index (JONAH) on the  right is mildly decreased (0.82).  The toe-brachial index (TBI) on the right is moderately  decreased (0.48).  The right toe pressure is 84 mmHg.  Pulse volume recording (PVR) waveforms are triphasic with  normal amplitudes at the right thigh and calf.  Waveforms  appear moderately diminished in amplitude at the right  ankle, metatarsal, and digit segments.  Left Findings: The ankle-brachial index (JONAH) on the left  is moderately decreased (0.61).    The toe-brachial index (TBI) on the left is moderately  decreased (0.31). The left toe pressure is 55 mmHg.    Pulse volume recording (PVR) waveforms are triphasic with  normal amplitudes at the left thigh and calf.  Waveforms  appear moderately diminished in amplitude at the left  ankle, metatarsal, and digit segments.  ------------------------------------------------------------------------  Summary/Impressions:  1.  Right JONAH is mildly decreased (0.82).  Right TBI is  moderately decreased (0.48), with a toe pressure of 84  mmHg.  Findings suggest the presence of distal  infrapopliteal arterial disease in the right lower  extremity.  2.  Left JONAH is moderately decreased (0.61). Left TBI is  moderately decreased (0.31), with a toe pressure of 55  mmHg.  Findings suggest the presence of distal  infrapopliteal arterial disease in the left lower  extremity.      < from: Xray Foot AP + Lateral, Left (08.27.20 @ 15:32) >  IMPRESSION:  2nd toe soft tissue swelling with focal ulceration at tip. In addition, eroded and indistinct underlying 2nd distal phalangeal tuft cortical margins consistent with radiographic changes of osteomyelitis. No tracking gas collections beyond this region and no additional focal areas of osteomyelitis.    No fractures or dislocations.    Tarsometatarsal alignment maintained without evidence for a Lisfranc injury.    Preserved joint spaces and no joint margin erosions.    Osteopenic appearing osseous structures, however, bone mineralization more accurately assessed with densitometry. Otherwise no discrete suspicious lytic or blastic lesions.    < end of copied text >

## 2020-08-28 NOTE — PROGRESS NOTE ADULT - ASSESSMENT
Pt is 60 yo who presents w/ LF second digit wound to bone  -pt was seen and evaluated   -Afebrile, no leukocytosis  -LF second digit fibronecrotic wound to bone, w/ undermining, no purulence. No periwound erythema, no malodor, etiology likely ischemic. Wound is tender to touch.   -Xray: OM to distal phallanx of left second digit  -Discussed surgical intervention w/ patient for left foot 2nd digit amputation, pt agreeable to plan   -Pod plan for left foot partial 2nd ray resection, date/time TBD   -Please document medical clearance/ optimization for LF surgery under light sedation w/ local anesthesia  -Seen w/ attending Pt is 60 yo who presents w/ LF second digit wound to bone  -pt was seen and evaluated   -Afebrile, no leukocytosis  -LF second digit fibronecrotic wound to bone, w/ undermining, no purulence. No periwound erythema, no malodor, etiology likely ischemic. Wound is tender to touch.   -Xray: OM to distal phallanx of left second digit  -Continue with LWC   -Discussed surgical intervention w/ patient for left foot 2nd digit amputation, pt agreeable to plan   -Surgery pending vascular recommendations   -Pod plan for left foot partial 2nd ray resection, date/time TBD   -Please document medical clearance/ optimization for LF surgery under light sedation w/ local anesthesia  -Seen w/ attending Pt is 58 yo who presents w/ LF second digit wound to bone  -pt was seen and evaluated   -Afebrile, no leukocytosis  -LF second digit fibronecrotic wound to bone, w/ undermining, no purulence. No periwound erythema, no malodor, etiology likely ischemic. Wound is tender to touch.   -Xray: OM to distal phallanx of left second digit  -Continue with LWC   -Discussed surgical intervention w/ patient for left foot 2nd digit amputation, pt agreeable to plan   -Surgery pending vascular recommendations   -Pod plan for left foot partial 2nd ray resection next week, date/time TBD   -Please document medical clearance/ optimization for LF surgery under light sedation w/ local anesthesia  -Seen w/ attending

## 2020-08-28 NOTE — PROGRESS NOTE ADULT - PROBLEM SELECTOR PLAN 1
Ongoing L 2nd toe foot ulcer. Now with worsening pain and clear/blood tinged drainage. Afebrile. No leukcytosis. CRP negative. ESR 34. Xray with 2nd toe soft tissue swelling with focal ulceration at tip. In addition, eroded and indistinct underlying 2nd distal phalangeal tuft cortical margins consistent with radiographic changes of osteomyelitis. No tracking gas collections beyond this region and no additional focal areas of osteomyelitis. S/p vanc/cefepime in the ED.   - Podiatry assessed the patient, recommending:      - JONAH/PVR     - IV antibiotics      - Med clearance   - C/w vanc/cefepime for now   - Medical clearance pending: RCRI score 1 - class II risk, 6.0% risk of death, MI or cardiac arrest. Brown score 1.6%. Can complete <4 METS 2/2 pain. No chest pain or SOB with exertion. no prior surgeries in the past. Medical clearance pending EKG and echo given extensive vascular history. Ongoing L 2nd toe foot ulcer. Now with worsening pain and clear/blood tinged drainage. Afebrile. No leukcytosis. CRP negative. ESR 34. Xray with 2nd toe soft tissue swelling with focal ulceration at tip. In addition, eroded and indistinct underlying 2nd distal phalangeal tuft cortical margins consistent with radiographic changes of osteomyelitis. No tracking gas collections beyond this region and no additional focal areas of osteomyelitis. S/p vanc/cefepime in the ED.   - Podiatry assessed the patient, recommending:      - JONAH/PVR     - IV antibiotics      - Med clearance   - C/w vanc/cefepime for now   - Medical clearance pending: RCRI score 1 - class II risk, 6.0% risk of death, MI or cardiac arrest. Brown score 1.6%. Can complete <4 METS 2/2 pain. No chest pain or SOB with exertion. no prior surgeries in the past.   TTE noted, with normal LV and RV function, mild diastolic function

## 2020-08-28 NOTE — PROGRESS NOTE ADULT - PROBLEM SELECTOR PLAN 2
Hx of PAD s/p 2 stents   - C/w ASA and plavix   - Pending JONAH/PVR Hx of PAD s/p 2 stents   - C/w ASA and plavix   - Pending JONAH/PVR  - will  consult vascular after JONAH/PVR

## 2020-08-28 NOTE — CONSULT NOTE ADULT - ATTENDING COMMENTS
I have discussed the case with the surgical house staff. I have personally seen, examined, and participated in the care of this patient. I have reviewed all pertinent clinical information.    Patient with left second toe wound. she is s/p left leg angiogram with intervention.  On exam, left foot with second toe gangrene with bone exposed. foot warm. doppler signals. cap refill 2 sec    JONAH/PVR reviewed.  Left TBI is 0.63 with toe pressure 55. Blunted waveforms seen on PVRs at level of metatarsals.     Recommendations:  - TBI and toe pressures indicate patient likely to heal ray amputation. Will follow closely for any signs of wound breakdown or nonhealing.  If any concern for healing post amputation, patient will need repeat angiogram with possible intervention.  - Please obtain records (operative report for angiogram) from [facility] as this will help determine if additional intervention is needed at this time.   - Arterial duplex.

## 2020-08-28 NOTE — CHART NOTE - NSCHARTNOTEFT_GEN_A_CORE
- JONAH/PVR-moderate impairment.  plan for left foot partial 2nd ray resection, date/time TBD, pending vascular rec, consult called     JEANNIE Henry-C    pager [phone]

## 2020-08-28 NOTE — PROGRESS NOTE ADULT - PROBLEM SELECTOR PLAN 3
Hx of DM. A1C unknown. On Lantus 100U Qhs and metformin 1000mg BID at home.   - Hold metformin   - Lantus 50U BID   - F/u A1C Hx of DM. A1C 9.6   -. On Lantus 100U Qhs and metformin 1000mg BID at home.   - Hold metformin   - Lantus 50U BID   - c/w sliding scale for now

## 2020-08-28 NOTE — PROGRESS NOTE ADULT - SUBJECTIVE AND OBJECTIVE BOX
Patient is a 59y old  Female who presents with a chief complaint of     SUBJECTIVE / OVERNIGHT EVENTS:    MEDICATIONS  (STANDING):  amLODIPine   Tablet 2.5 milliGRAM(s) Oral every 24 hours  aspirin  chewable 81 milliGRAM(s) Oral daily  cefepime   IVPB 2000 milliGRAM(s) IV Intermittent every 8 hours  clopidogrel Tablet 75 milliGRAM(s) Oral daily  collagenase Ointment 1 Application(s) Topical daily  dextrose 5%. 1000 milliLiter(s) (50 mL/Hr) IV Continuous <Continuous>  dextrose 50% Injectable 12.5 Gram(s) IV Push once  dextrose 50% Injectable 25 Gram(s) IV Push once  dextrose 50% Injectable 25 Gram(s) IV Push once  enoxaparin Injectable 40 milliGRAM(s) SubCutaneous daily  insulin glargine Injectable (LANTUS) 50 Unit(s) SubCutaneous every morning  insulin glargine Injectable (LANTUS) 50 Unit(s) SubCutaneous at bedtime  insulin lispro (HumaLOG) corrective regimen sliding scale   SubCutaneous three times a day before meals  losartan 100 milliGRAM(s) Oral every 24 hours  simvastatin 40 milliGRAM(s) Oral at bedtime  vancomycin  IVPB 1000 milliGRAM(s) IV Intermittent every 12 hours    MEDICATIONS  (PRN):  acetaminophen   Tablet .. 650 milliGRAM(s) Oral every 6 hours PRN Severe Pain (7 - 10)  dextrose 40% Gel 15 Gram(s) Oral once PRN Blood Glucose LESS THAN 70 milliGRAM(s)/deciliter  glucagon  Injectable 1 milliGRAM(s) IntraMuscular once PRN Glucose LESS THAN 70 milligrams/deciliter      Vital Signs Last 24 Hrs  T(C): 36.4 (28 Aug 2020 04:55), Max: 36.8 (27 Aug 2020 17:25)  T(F): 97.6 (28 Aug 2020 04:55), Max: 98.3 (27 Aug 2020 17:25)  HR: 90 (28 Aug 2020 04:55) (86 - 110)  BP: 160/89 (28 Aug 2020 04:55) (156/84 - 189/104)  BP(mean): --  RR: 18 (28 Aug 2020 04:55) (14 - 18)  SpO2: 100% (28 Aug 2020 04:55) (100% - 100%)  CAPILLARY BLOOD GLUCOSE      POCT Blood Glucose.: 153 mg/dL (28 Aug 2020 08:29)  POCT Blood Glucose.: 131 mg/dL (28 Aug 2020 02:15)  POCT Blood Glucose.: 178 mg/dL (27 Aug 2020 23:01)  POCT Blood Glucose.: 240 mg/dL (27 Aug 2020 19:14)  POCT Blood Glucose.: 230 mg/dL (27 Aug 2020 14:18)    I&O's Summary      PHYSICAL EXAM:  GENERAL: NAD, well-developed  HEAD:  Atraumatic, Normocephalic  EYES: EOMI, PERRLA, conjunctiva and sclera clear  NECK: Supple, No JVD  CHEST/LUNG: Clear to auscultation bilaterally; No wheeze  HEART: Regular rate and rhythm; No murmurs, rubs, or gallops  ABDOMEN: Soft, Nontender, Nondistended; Bowel sounds present  EXTREMITIES:  2+ Peripheral Pulses, No clubbing, cyanosis, or edema  PSYCH: AAOx3  NEUROLOGY: non-focal  SKIN: No rashes or lesions    LABS:                        13.3   8.63  )-----------( 318      ( 27 Aug 2020 15:40 )             39.1     08-27    141  |  104  |  13  ----------------------------<  211<H>  3.5   |  24  |  0.74    Ca    9.2      27 Aug 2020 15:40    TPro  7.8  /  Alb  4.1  /  TBili  0.4  /  DBili  x   /  AST  20  /  ALT  23  /  AlkPhos  78  08-27    PT/INR - ( 27 Aug 2020 16:46 )   PT: 12.8 SEC;   INR: 1.13                    RADIOLOGY & ADDITIONAL TESTS:    Imaging Personally Reviewed:    Consultant(s) Notes Reviewed:      Care Discussed with Consultants/Other Providers: Patient is a 59y old  Female who presents with a chief complaint of     SUBJECTIVE / OVERNIGHT EVENTS: patient seen and examined by bedside, denies headache, dizziness, SOB, CP, Palpitations , N/V/D, abdominal pain  c/o pain in the foot       MEDICATIONS  (STANDING):  amLODIPine   Tablet 2.5 milliGRAM(s) Oral every 24 hours  aspirin  chewable 81 milliGRAM(s) Oral daily  cefepime   IVPB 2000 milliGRAM(s) IV Intermittent every 8 hours  clopidogrel Tablet 75 milliGRAM(s) Oral daily  collagenase Ointment 1 Application(s) Topical daily  dextrose 5%. 1000 milliLiter(s) (50 mL/Hr) IV Continuous <Continuous>  dextrose 50% Injectable 12.5 Gram(s) IV Push once  dextrose 50% Injectable 25 Gram(s) IV Push once  dextrose 50% Injectable 25 Gram(s) IV Push once  enoxaparin Injectable 40 milliGRAM(s) SubCutaneous daily  insulin glargine Injectable (LANTUS) 50 Unit(s) SubCutaneous every morning  insulin glargine Injectable (LANTUS) 50 Unit(s) SubCutaneous at bedtime  insulin lispro (HumaLOG) corrective regimen sliding scale   SubCutaneous three times a day before meals  losartan 100 milliGRAM(s) Oral every 24 hours  simvastatin 40 milliGRAM(s) Oral at bedtime  vancomycin  IVPB 1000 milliGRAM(s) IV Intermittent every 12 hours    MEDICATIONS  (PRN):  acetaminophen   Tablet .. 650 milliGRAM(s) Oral every 6 hours PRN Severe Pain (7 - 10)  dextrose 40% Gel 15 Gram(s) Oral once PRN Blood Glucose LESS THAN 70 milliGRAM(s)/deciliter  glucagon  Injectable 1 milliGRAM(s) IntraMuscular once PRN Glucose LESS THAN 70 milligrams/deciliter      Vital Signs Last 24 Hrs  T(C): 36.4 (28 Aug 2020 04:55), Max: 36.8 (27 Aug 2020 17:25)  T(F): 97.6 (28 Aug 2020 04:55), Max: 98.3 (27 Aug 2020 17:25)  HR: 90 (28 Aug 2020 04:55) (86 - 110)  BP: 160/89 (28 Aug 2020 04:55) (156/84 - 189/104)  BP(mean): --  RR: 18 (28 Aug 2020 04:55) (14 - 18)  SpO2: 100% (28 Aug 2020 04:55) (100% - 100%)  CAPILLARY BLOOD GLUCOSE      POCT Blood Glucose.: 153 mg/dL (28 Aug 2020 08:29)  POCT Blood Glucose.: 131 mg/dL (28 Aug 2020 02:15)  POCT Blood Glucose.: 178 mg/dL (27 Aug 2020 23:01)  POCT Blood Glucose.: 240 mg/dL (27 Aug 2020 19:14)  POCT Blood Glucose.: 230 mg/dL (27 Aug 2020 14:18)    I&O's Summary      PHYSICAL EXAM:  GENERAL: NAD, well-developed  HEAD:  Atraumatic, Normocephalic  EYES: EOMI, PERRLA, conjunctiva and sclera clear  CHEST/LUNG: Clear to auscultation bilaterally; No wheeze  HEART: Regular rate and rhythm;   ABDOMEN: Soft, Nontender, Nondistended; Bowel sounds present  EXTREMITIES: diminished  Peripheral Pulses, No clubbing, cyanosis, or edema, left foot with dressing c/d/i  PSYCH: AAOx3  NEUROLOGY: non-focal      LABS:                        13.3   8.63  )-----------( 318      ( 27 Aug 2020 15:40 )             39.1     08-27    141  |  104  |  13  ----------------------------<  211<H>  3.5   |  24  |  0.74    Ca    9.2      27 Aug 2020 15:40    TPro  7.8  /  Alb  4.1  /  TBili  0.4  /  DBili  x   /  AST  20  /  ALT  23  /  AlkPhos  78  08-27    PT/INR - ( 27 Aug 2020 16:46 )   PT: 12.8 SEC;   INR: 1.13                    RADIOLOGY & ADDITIONAL TESTS:  < from: Transthoracic Echocardiogram (08.28.20 @ 09:52) >  ------------------------------------------------------------------------  CONCLUSIONS:  1. Normal mitral valve. Minimal mitral regurgitation.  2. Normal left ventricular internal dimensions and wall  thicknesses.  3. Normal left ventricular systolic function. No segmental  wall motion abnormalities.  4. Mild diastolic dysfunction (Stage I).  5. Normal right ventricular size and function.    < end of copied text >    Imaging Personally Reviewed:    Consultant(s) Notes Reviewed:  podiatry     Care Discussed with Consultants/Other Providers:

## 2020-08-29 ENCOUNTER — TRANSCRIPTION ENCOUNTER (OUTPATIENT)
Age: 60
End: 2020-08-29

## 2020-08-29 LAB
-  AMOXICILLIN/CLAVULANIC ACID: SIGNIFICANT CHANGE UP
-  AMPICILLIN/SULBACTAM: SIGNIFICANT CHANGE UP
-  CEFAZOLIN: SIGNIFICANT CHANGE UP
-  CEFTRIAXONE: SIGNIFICANT CHANGE UP
-  CIPROFLOXACIN: SIGNIFICANT CHANGE UP
-  CLINDAMYCIN: SIGNIFICANT CHANGE UP
-  DAPTOMYCIN: SIGNIFICANT CHANGE UP
-  ERYTHROMYCIN: SIGNIFICANT CHANGE UP
-  GENTAMICIN: SIGNIFICANT CHANGE UP
-  LEVOFLOXACIN: SIGNIFICANT CHANGE UP
-  LINEZOLID: SIGNIFICANT CHANGE UP
-  MEROPENEM: SIGNIFICANT CHANGE UP
-  MOXIFLOXACIN(AEROBIC): SIGNIFICANT CHANGE UP
-  OXACILLIN: SIGNIFICANT CHANGE UP
-  RIFAMPIN: SIGNIFICANT CHANGE UP
-  TETRACYCLINE: SIGNIFICANT CHANGE UP
-  TRIMETHOPRIM/SULFAMETHOXAZOLE: SIGNIFICANT CHANGE UP
-  VANCOMYCIN: SIGNIFICANT CHANGE UP
CULTURE RESULTS: SIGNIFICANT CHANGE UP
GLUCOSE BLDC GLUCOMTR-MCNC: 132 MG/DL — HIGH (ref 70–99)
GLUCOSE BLDC GLUCOMTR-MCNC: 178 MG/DL — HIGH (ref 70–99)
GLUCOSE BLDC GLUCOMTR-MCNC: 211 MG/DL — HIGH (ref 70–99)
GLUCOSE BLDC GLUCOMTR-MCNC: 326 MG/DL — HIGH (ref 70–99)
METHOD TYPE: SIGNIFICANT CHANGE UP
ORGANISM # SPEC MICROSCOPIC CNT: SIGNIFICANT CHANGE UP
SPECIMEN SOURCE: SIGNIFICANT CHANGE UP
VANCOMYCIN TROUGH SERPL-MCNC: 14.6 UG/ML — SIGNIFICANT CHANGE UP (ref 10–20)

## 2020-08-29 PROCEDURE — 99253 IP/OBS CNSLTJ NEW/EST LOW 45: CPT

## 2020-08-29 PROCEDURE — 99233 SBSQ HOSP IP/OBS HIGH 50: CPT

## 2020-08-29 RX ADMIN — CLOPIDOGREL BISULFATE 75 MILLIGRAM(S): 75 TABLET, FILM COATED ORAL at 12:43

## 2020-08-29 RX ADMIN — CEFEPIME 100 MILLIGRAM(S): 1 INJECTION, POWDER, FOR SOLUTION INTRAMUSCULAR; INTRAVENOUS at 05:17

## 2020-08-29 RX ADMIN — Medication 81 MILLIGRAM(S): at 12:43

## 2020-08-29 RX ADMIN — LOSARTAN POTASSIUM 100 MILLIGRAM(S): 100 TABLET, FILM COATED ORAL at 17:50

## 2020-08-29 RX ADMIN — CEFEPIME 100 MILLIGRAM(S): 1 INJECTION, POWDER, FOR SOLUTION INTRAMUSCULAR; INTRAVENOUS at 21:23

## 2020-08-29 RX ADMIN — Medication 4: at 12:38

## 2020-08-29 RX ADMIN — Medication 650 MILLIGRAM(S): at 08:17

## 2020-08-29 RX ADMIN — CEFEPIME 100 MILLIGRAM(S): 1 INJECTION, POWDER, FOR SOLUTION INTRAMUSCULAR; INTRAVENOUS at 12:45

## 2020-08-29 RX ADMIN — Medication 8: at 17:47

## 2020-08-29 RX ADMIN — Medication 250 MILLIGRAM(S): at 06:18

## 2020-08-29 RX ADMIN — SIMVASTATIN 40 MILLIGRAM(S): 20 TABLET, FILM COATED ORAL at 21:23

## 2020-08-29 RX ADMIN — INSULIN GLARGINE 50 UNIT(S): 100 INJECTION, SOLUTION SUBCUTANEOUS at 09:23

## 2020-08-29 RX ADMIN — ENOXAPARIN SODIUM 40 MILLIGRAM(S): 100 INJECTION SUBCUTANEOUS at 12:43

## 2020-08-29 RX ADMIN — Medication 650 MILLIGRAM(S): at 09:17

## 2020-08-29 RX ADMIN — AMLODIPINE BESYLATE 2.5 MILLIGRAM(S): 2.5 TABLET ORAL at 17:47

## 2020-08-29 RX ADMIN — Medication 1 APPLICATION(S): at 10:01

## 2020-08-29 RX ADMIN — INSULIN GLARGINE 50 UNIT(S): 100 INJECTION, SOLUTION SUBCUTANEOUS at 22:34

## 2020-08-29 RX ADMIN — Medication 250 MILLIGRAM(S): at 17:50

## 2020-08-29 NOTE — DISCHARGE NOTE PROVIDER - NSDCFUADDINST_GEN_ALL_CORE_FT
Podiatry Discharge Instructions:  Follow up: Please follow up with Dr. Preet Stauffer within 1 week of discharge from the hospital, please call [phone] for appointment and discuss that you recently were seen in the hospital.  Wound Care: Please leave your dressing clean dry intact until your follow up appointment  Weight bearing: Please weight bear as tolerated to right foot in a surgical shoe with weight distributed to heel.  Antibiotics: Please continue as instructed.

## 2020-08-29 NOTE — PROGRESS NOTE ADULT - PROBLEM SELECTOR PLAN 2
Hx of PAD s/p 2 stents   - C/w ASA and plavix   - Pending JONAH/PVR  - will  consult vascular after JONAH/PVR Hx of PAD s/p 2 stents   - C/w ASA and plavix   - JONAH/PVR noted, vascular requesting  angiogram records from [facility] with Dr. Eduardo Baker  -Podiatry planning left foot partial 2nd ray resection next week,

## 2020-08-29 NOTE — DISCHARGE NOTE PROVIDER - NSDCCPCAREPLAN_GEN_ALL_CORE_FT
PRINCIPAL DISCHARGE DIAGNOSIS  Diagnosis: Toe infection  Assessment and Plan of Treatment: You had a left partial 2nd toe resection. ___________  You will need to follow up with Podiatry within 1 week of discharge for further medical management.      SECONDARY DISCHARGE DIAGNOSES  Diagnosis: DM (diabetes mellitus), type 2  Assessment and Plan of Treatment: Continue consistent carbohydrate diet.  Monitor blood glucose levels throughout the day before meals and at bedtime.  Record blood sugars and bring to outpatient providers appointment in order to be reviewed by your doctor for management modifications.  Be aware of diabetes management symptoms including feeling cool and clammy may be related to low glucose levels.  Feeling hot and dry may indicate high glucose levels.  If  you feel these symptoms, check your blood sugar.  Make regular podiatry appointments in order to have feet checked for wounds and toe nails cut by a doctor to prevent infections.    Diagnosis: PAD (peripheral artery disease)  Assessment and Plan of Treatment:     Diagnosis: HTN (hypertension)  Assessment and Plan of Treatment: Continue current blood pressure medication regimen as directed. Monitor for any visual changes, headaches or dizziness.  Monitor blood pressure regularly.  Follow up with your PCP for further management for high blood pressure, please call to make appointment within 1 week of discharge PRINCIPAL DISCHARGE DIAGNOSIS  Diagnosis: Toe infection  Assessment and Plan of Treatment: You had a left partial 2nd toe resection. ___________  You will need to follow up with Podiatry within 1 week of discharge for further medical management.      SECONDARY DISCHARGE DIAGNOSES  Diagnosis: PAD (peripheral artery disease)  Assessment and Plan of Treatment:     Diagnosis: HTN (hypertension)  Assessment and Plan of Treatment: Continue current blood pressure medication regimen as directed. Monitor for any visual changes, headaches or dizziness.  Monitor blood pressure regularly.  Follow up with your PCP for further management for high blood pressure, please call to make appointment within 1 week of discharge    Diagnosis: DM (diabetes mellitus), type 2  Assessment and Plan of Treatment: Continue consistent carbohydrate diet.  Monitor blood glucose levels throughout the day before meals and at bedtime.  Record blood sugars and bring to outpatient providers appointment in order to be reviewed by your doctor for management modifications.  Be aware of diabetes management symptoms including feeling cool and clammy may be related to low glucose levels.  Feeling hot and dry may indicate high glucose levels.  If  you feel these symptoms, check your blood sugar.  Make regular podiatry appointments in order to have feet checked for wounds and toe nails cut by a doctor to prevent infections. PRINCIPAL DISCHARGE DIAGNOSIS  Diagnosis: Toe infection  Assessment and Plan of Treatment: You had a left partial 2nd toe resection. Keep wound clean, dry, intact.You will need to follow up with Podiatry within 1 week of discharge for further medical management.      SECONDARY DISCHARGE DIAGNOSES  Diagnosis: PAD (peripheral artery disease)  Assessment and Plan of Treatment: Continue ASA, Plavix. Follow up outpatient Vascular in 1-2 weeks for further management.    Diagnosis: HTN (hypertension)  Assessment and Plan of Treatment: Continue current blood pressure medication regimen as directed. Monitor for any visual changes, headaches or dizziness.  Monitor blood pressure regularly.  Follow up with your PCP for further management for high blood pressure, please call to make appointment within 1 week of discharge    Diagnosis: Hyperlipidemia, unspecified hyperlipidemia type  Assessment and Plan of Treatment: Continue cholesterol control medications. Continue DASH diet. Follow up with your PCP within 1 week of discharge for further management and monitoring of lipid and cholesterol panels.      Diagnosis: DM (diabetes mellitus), type 2  Assessment and Plan of Treatment: Continue consistent carbohydrate diet.  Monitor blood glucose levels throughout the day before meals and at bedtime.  Record blood sugars and bring to outpatient providers appointment in order to be reviewed by your doctor for management modifications.  Be aware of diabetes management symptoms including feeling cool and clammy may be related to low glucose levels.  Feeling hot and dry may indicate high glucose levels.  If  you feel these symptoms, check your blood sugar.  Make regular podiatry appointments in order to have feet checked for wounds and toe nails cut by a doctor to prevent infections. PRINCIPAL DISCHARGE DIAGNOSIS  Diagnosis: Toe infection  Assessment and Plan of Treatment: You had a left partial 2nd toe resection. Keep wound dressing clean, dry, intact. Continue antibiotics as recommended to complete course. You will need to follow up with Podiatry within 1 week of discharge for further medical management.      SECONDARY DISCHARGE DIAGNOSES  Diagnosis: PAD (peripheral artery disease)  Assessment and Plan of Treatment: Continue ASA, Plavix. Follow up outpatient Vascular in 1-2 weeks for further management.    Diagnosis: HTN (hypertension)  Assessment and Plan of Treatment: Continue current blood pressure medication regimen as directed. Monitor for any visual changes, headaches or dizziness.  Monitor blood pressure regularly.  Follow up with your PCP for further management for high blood pressure, please call to make appointment within 1 week of discharge    Diagnosis: Hyperlipidemia, unspecified hyperlipidemia type  Assessment and Plan of Treatment: Continue cholesterol control medications. Continue DASH diet. Follow up with your PCP within 1 week of discharge for further management and monitoring of lipid and cholesterol panels.      Diagnosis: DM (diabetes mellitus), type 2  Assessment and Plan of Treatment: Continue consistent carbohydrate diet.  Monitor blood glucose levels throughout the day before meals and at bedtime.  Record blood sugars and bring to outpatient providers appointment in order to be reviewed by your doctor for management modifications.  Be aware of diabetes management symptoms including feeling cool and clammy may be related to low glucose levels.  Feeling hot and dry may indicate high glucose levels.  If  you feel these symptoms, check your blood sugar.  Make regular podiatry appointments in order to have feet checked for wounds and toe nails cut by a doctor to prevent infections. PRINCIPAL DISCHARGE DIAGNOSIS  Diagnosis: Toe infection  Assessment and Plan of Treatment: You had a left partial 2nd toe resection. Keep wound dressing clean, dry, intact. Continue antibiotics as recommended to complete course. You will need to follow up with Podiatry within 1 week of discharge for further medical management.      SECONDARY DISCHARGE DIAGNOSES  Diagnosis: PAD (peripheral artery disease)  Assessment and Plan of Treatment: Continue ASA, Plavix. Follow up outpatient Vascular in 1-2 weeks for further management.    Diagnosis: HTN (hypertension)  Assessment and Plan of Treatment: Continue current blood pressure medication regimen as directed. Monitor for any visual changes, headaches or dizziness.  Monitor blood pressure regularly.  Follow up with your PCP for further management for high blood pressure, please call to make appointment within 1 week of discharge    Diagnosis: Hyperlipidemia, unspecified hyperlipidemia type  Assessment and Plan of Treatment: Continue cholesterol control medications. Continue DASH diet. Follow up with your PCP within 1 week of discharge for further management and monitoring of lipid and cholesterol panels.      Diagnosis: DM (diabetes mellitus), type 2  Assessment and Plan of Treatment: Continue consistent carbohydrate diet.  Monitor blood glucose levels throughout the day before meals and at bedtime.  Record blood sugars and bring to outpatient providers appointment in order to be reviewed by your doctor for management modifications.  Be aware of diabetes management symptoms including feeling cool and clammy may be related to low glucose levels.  Feeling hot and dry may indicate high glucose levels.  If  you feel these symptoms, check your blood sugar.  Make regular podiatry appointments in order to have feet checked for wounds and toe nails cut by a doctor to prevent infections. Follow up as scheduled at [facility] [address] [facility], [place], [phone] on 12/8/2020 at 9:40 am

## 2020-08-29 NOTE — DISCHARGE NOTE PROVIDER - HOSPITAL COURSE
60 y/o F with PMH HTN, HLD, DM2, PAD s/p 2 stents in left leg with left 2nd toe ulcer who presents for worsening left 2nd toe pain with likely osteomyelitis.         Hospital Course: 59 F with PMH HTN, HLD, DM2 (A1c 9.6), PAD S/P 2 stents in Lt leg (April 2020) with left 2nd toe ulcer who presents for worsening Lt 2nd toe pain with likely osteomyelitis. ID consulted. Pt placed on Vanco/ Cefepime, changed to Ancef 9/1 as per ID recommendations. Vascular/ Podiatry consulted for further management of PAD/OM. JONAH/PVR done showed Rt JONAH is mildly decreased (0.82). Lt JONAH is moderately decreased 0.61. Arterial Doppler done showed high-grade (>75%) stenosis noted at the mid-distal Lt SFA. There is three-vessel runoff to the distal left ankle. No significant occlusive arterial disease noted in the proximal RLE. No acute intervention per Vascular, recommend to continue ASA, Plavix. S/P LF partial 2nd ray resection on 9/2 with Dr Stauffer. OR cultures negative to date.         Endo consulted for DM management. Insulin adjusted accordingly during hospital stay with controlled BG levels. Teaching given to family to assist in insulin administration at home. Discharge with insulin plus oral DM regimen. 59 F with PMH HTN, HLD, DM2 (A1c 9.6), PAD S/P 2 stents in Lt leg (April 2020) with left 2nd toe ulcer who presents for worsening Lt 2nd toe pain with likely osteomyelitis. ID consulted. Pt placed on Vanco/ Cefepime, changed to Ancef 9/1 as per ID recommendations. Vascular/ Podiatry consulted for further management of PAD/OM. JONAH/PVR done showed Rt JONAH is mildly decreased (0.82). Lt JONAH is moderately decreased 0.61. Arterial Doppler done showed high-grade (>75%) stenosis noted at the mid-distal Lt SFA. There is three-vessel runoff to the distal left ankle. No significant occlusive arterial disease noted in the proximal RLE. No acute intervention per Vascular, recommend to continue ASA, Plavix. S/P LF partial 2nd ray resection on 9/2 with Dr Stauffer. OR cultures negative to date.         Endo consulted for DM management. Insulin adjusted accordingly during hospital stay with controlled BG levels. Teaching given to family to assist in insulin administration at home. Discharge with insulin plus oral DM regimen.         Spoke with attending, Dr. Adams, pt is medically stable for discharge to home

## 2020-08-29 NOTE — DISCHARGE NOTE PROVIDER - PROVIDER TOKENS
PROVIDER:[TOKEN:[[id number]]] PROVIDER:[TOKEN:[[id number]]] PROVIDER:[TOKEN:[[id number]]],PROVIDER:[TOKEN:[[id number]]]

## 2020-08-29 NOTE — PROGRESS NOTE ADULT - PROBLEM SELECTOR PLAN 3
Hx of DM. A1C 9.6   -. On Lantus 100U Qhs and metformin 1000mg BID at home.   - Hold metformin   - Lantus 50U BID   - c/w sliding scale for now

## 2020-08-29 NOTE — DISCHARGE NOTE PROVIDER - CARE PROVIDERS DIRECT ADDRESSES
,DirectAddress_Unknown ,DirectAddress_Unknown,[email or web address]

## 2020-08-29 NOTE — PROGRESS NOTE ADULT - ASSESSMENT
58 y/o F with PMH HTN, HLD, DM2, PAD s/p 2 stents in left leg with left 2nd toe ulcer who presents for worsening left 2nd toe pain with likely osteomyelitis.

## 2020-08-29 NOTE — PROGRESS NOTE ADULT - ASSESSMENT
58 y/o F with PMH HTN, HLD, DM2, PAD s/p 2 stents in left leg (Angioplasty performed with Dr. Baker in [facility] in April 2020) with left 2nd toe ulcer who presents for worsening left 2nd toe pain. Foot xray with concerns for osteomyelitis. She now has JONAH/PVR that demonstrate left toe pressure of 55 and JONAH of 0.61. She had angioplasty in April 2020 at [facility] but wound worsened and was more painful starting in June 2020. We are consulted for possible need for LLE revascularization.    PLAN:    - Awaiting angiogram records from [facility] with Dr. Eduardo Baker  - Will determine need for repeat angiogram after reviewing records, foot is warm with dopplerable DP/PT  - Podiatry planning for toe amputation     Vascular Surgery [phone]

## 2020-08-29 NOTE — PROGRESS NOTE ADULT - SUBJECTIVE AND OBJECTIVE BOX
VASCULAR SURGERY PROGRESS NOTE    INTERVAL EVENTS: Patient complaining of worsening pain in her toe/foot this morning.       OBJECTIVE:    Vital Signs Last 24 Hrs  T(C): 36.7 (29 Aug 2020 05:15), Max: 37.1 (28 Aug 2020 20:40)  T(F): 98.1 (29 Aug 2020 05:15), Max: 98.8 (28 Aug 2020 20:40)  HR: 81 (29 Aug 2020 05:15) (81 - 94)  BP: 135/65 (29 Aug 2020 05:15) (135/65 - 154/66)  BP(mean): --  RR: 18 (29 Aug 2020 05:15) (18 - 18)  SpO2: 99% (29 Aug 2020 05:15) (99% - 99%)    General: NAD  HEENT: NC/AT, no scleral icterus  Pulmonary: normal resp effort, CTA-B  Cardiovascular: NSR, no murmurs  Abdominal: soft, ND/NT  Extremities: WWP, no edema  Neuro: A/O x 3, normal sensation, no focal deficits  Pulses: Palpable femoral pulses bilaterally, Palpable right DP pulse and dopplerable PT, PT/DP dopplerable signal on left, toe very tender to palpation     I&O's Summary    I&O's Detail        LABS:                        13.3   8.63  )-----------( 318      ( 27 Aug 2020 15:40 )             39.1     08-27    141  |  104  |  13  ----------------------------<  211<H>  3.5   |  24  |  0.74    Ca    9.2      27 Aug 2020 15:40    TPro  7.8  /  Alb  4.1  /  TBili  0.4  /  DBili  x   /  AST  20  /  ALT  23  /  AlkPhos  78  08-27    PT/INR - ( 27 Aug 2020 16:46 )   PT: 12.8 SEC;   INR: 1.13                RADIOLOGY & ADDITIONAL STUDIES:

## 2020-08-29 NOTE — DISCHARGE NOTE PROVIDER - NSDCMRMEDTOKEN_GEN_ALL_CORE_FT
aspirin 81 mg oral tablet, chewable: 1 tab(s) orally once a day  Lantus 100 units/mL subcutaneous solution: 100 unit(s) subcutaneous once a day (at bedtime)  losartan 100 mg oral tablet: 1 tab(s) orally once a day  metFORMIN 1000 mg oral tablet: 1 tab(s) orally 2 times a day  Norvasc 2.5 mg oral tablet: 1 tab(s) orally once a day  Plavix 75 mg oral tablet: 1 tab(s) orally once a day  simvastatin 40 mg oral tablet: 1 tab(s) orally once a day (at bedtime) alcohol swabs : Apply topically to affected area 4 times a day   aspirin 81 mg oral tablet, chewable: 1 tab(s) orally once a day  glucometer (per patient&#x27;s insurance): Test blood sugars four times a day. Dispense #1 glucometer.  glucometer (per patient&#x27;s insurance): Test blood sugars four times a day. Dispense #1 glucometer.  Insulin Pen Needles, 4mm: 1 application subcutaneously 4 times a day. ** Use with insulin pen **   lancets: 1 application subcutaneously 4 times a day   Lantus 100 units/mL subcutaneous solution: 100 unit(s) subcutaneous once a day (at bedtime)  Lantus Solostar Pen 100 units/mL subcutaneous solution: 12 unit(s) subcutaneous once a day (at bedtime)  Check for ins coverage    losartan 100 mg oral tablet: 1 tab(s) orally once a day  metFORMIN 1000 mg oral tablet: 1 tab(s) orally 2 times a day  Norvasc 2.5 mg oral tablet: 1 tab(s) orally once a day  Plavix 75 mg oral tablet: 1 tab(s) orally once a day  simvastatin 40 mg oral tablet: 1 tab(s) orally once a day (at bedtime)  test strips (per patient&#x27;s insurance): 1 application subcutaneously 4 times a day. ** Compatible with patient&#x27;s glucometer **  Tradjenta 5 mg oral tablet: 1 tab(s) orally once a day   verify coverage alcohol swabs : Check BG 4x daily (pre-meals and at bedtime)   amLODIPine 5 mg oral tablet: 1 tab(s) orally once a day  aspirin 81 mg oral tablet, chewable: 1 tab(s) orally once a day  glucometer (per patient&#x27;s insurance): Dispense #1 glucometer.  Insulin Pen Needles, 4mm: 1 application subcutaneously 4 times a day. ** Use with insulin pen **   lancets: Check BG 4x daily (premeals and at bedtime)  Lantus Solostar Pen 100 units/mL subcutaneous solution: 12 unit(s) subcutaneous once a day (at bedtime)  Check for ins coverage    losartan 100 mg oral tablet: 1 tab(s) orally once a day  metFORMIN 1000 mg oral tablet: 1 tab(s) orally 2 times a day  Plavix 75 mg oral tablet: 1 tab(s) orally once a day  simvastatin 40 mg oral tablet: 1 tab(s) orally once a day (at bedtime)  test strips (per patient&#x27;s insurance): Check BG 4x daily (pre-meals and at bedtime). ** Compatible with patient&#x27;s glucometer **  Tradjenta 5 mg oral tablet: 1 tab(s) orally once a day alcohol swabs : Check BG 4x daily (pre-meals and at bedtime)   amLODIPine 5 mg oral tablet: 1 tab(s) orally once a day  aspirin 81 mg oral tablet, chewable: 1 tab(s) orally once a day  glucometer (per patient&#x27;s insurance): Dispense #1 glucometer.  Insulin Pen Needles, 4mm: 1 application subcutaneously 4 times a day. ** Use with insulin pen **   Keflex 500 mg oral capsule: 1 cap(s) orally 4 times a day   lancets: Check BG 4x daily (premeals and at bedtime)  Lantus Solostar Pen 100 units/mL subcutaneous solution: 12 unit(s) subcutaneous once a day (at bedtime)  Check for ins coverage    losartan 100 mg oral tablet: 1 tab(s) orally once a day  metFORMIN 1000 mg oral tablet: 1 tab(s) orally 2 times a day  Plavix 75 mg oral tablet: 1 tab(s) orally once a day  simvastatin 40 mg oral tablet: 1 tab(s) orally once a day (at bedtime)  test strips (per patient&#x27;s insurance): Check BG 4x daily (pre-meals and at bedtime). ** Compatible with patient&#x27;s glucometer **  Tradjenta 5 mg oral tablet: 1 tab(s) orally once a day acetaminophen 325 mg oral tablet: 2 tab(s) orally every 6 hours, As needed, Mild Pain (1 - 3)  alcohol swabs : Check BG 4x daily (pre-meals and at bedtime)   amLODIPine 5 mg oral tablet: 1 tab(s) orally once a day  aspirin 81 mg oral tablet, chewable: 1 tab(s) orally once a day  glucometer (per patient&#x27;s insurance): Dispense #1 glucometer.  Insulin Pen Needles, 4mm: 1 application subcutaneously 4 times a day. ** Use with insulin pen **   Keflex 500 mg oral capsule: 1 cap(s) orally 4 times a day   lancets: Check BG 4x daily (premeals and at bedtime)  Lantus Solostar Pen 100 units/mL subcutaneous solution: 12 unit(s) subcutaneous once a day (at bedtime)  Check for ins coverage    losartan 100 mg oral tablet: 1 tab(s) orally once a day  metFORMIN 1000 mg oral tablet: 1 tab(s) orally 2 times a day  oxycodone-acetaminophen 5 mg-325 mg oral tablet: 1 tab(s) orally every 12 hours, As Needed -Severe pain MDD:2 tabs  Plavix 75 mg oral tablet: 1 tab(s) orally once a day  simvastatin 40 mg oral tablet: 1 tab(s) orally once a day (at bedtime)  test strips (per patient&#x27;s insurance): Check BG 4x daily (pre-meals and at bedtime). ** Compatible with patient&#x27;s glucometer **  Tradjenta 5 mg oral tablet: 1 tab(s) orally once a day acetaminophen 325 mg oral tablet: 2 tab(s) orally every 6 hours, As needed, Mild Pain (1 - 3)  alcohol swabs : Check BG 4x daily (pre-meals and at bedtime)   amLODIPine 5 mg oral tablet: 1 tab(s) orally once a day  aspirin 81 mg oral tablet, chewable: 1 tab(s) orally once a day  glucometer (per patient&#x27;s insurance): Dispense #1 glucometer.  Insulin Pen Needles, 4mm: 1 application subcutaneously 4 times a day. ** Use with insulin pen **   Keflex 500 mg oral capsule: 1 cap(s) orally 4 times a day   lancets: Check BG 4x daily (premeals and at bedtime)  Lantus Solostar Pen 100 units/mL subcutaneous solution: 12 unit(s) subcutaneous once a day (at bedtime)  Check for ins coverage    losartan 100 mg oral tablet: 1 tab(s) orally once a day  metFORMIN 1000 mg oral tablet: 1 tab(s) orally 2 times a day  oxyCODONE 5 mg oral capsule: 1 cap(s) orally every 8 hours, As Needed Severe pain.  MDD:3 tabs   Plavix 75 mg oral tablet: 1 tab(s) orally once a day  simvastatin 40 mg oral tablet: 1 tab(s) orally once a day (at bedtime)  test strips (per patient&#x27;s insurance): Check BG 4x daily (pre-meals and at bedtime). ** Compatible with patient&#x27;s glucometer **  Tradjenta 5 mg oral tablet: 1 tab(s) orally once a day acetaminophen 325 mg oral tablet: 2 tab(s) orally every 6 hours, As needed, Mild Pain (1 - 3)  alcohol swabs : Check BG 4x daily (pre-meals and at bedtime)   amLODIPine 5 mg oral tablet: 1 tab(s) orally once a day  aspirin 81 mg oral tablet, chewable: 1 tab(s) orally once a day  glucometer (per patient&#x27;s insurance): Dispense #1 glucometer.  Insulin Pen Needles, 4mm: 1 application subcutaneously 4 times a day. ** Use with insulin pen **   Keflex 500 mg oral capsule: 1 cap(s) orally 4 times a day   lancets: Check BG 4x daily (premeals and at bedtime)  Lantus Solostar Pen 100 units/mL subcutaneous solution: 12 unit(s) subcutaneous once a day (at bedtime)  Check for ins coverage    losartan 100 mg oral tablet: 1 tab(s) orally once a day  metFORMIN 1000 mg oral tablet: 1 tab(s) orally 2 times a day  oxycodone-acetaminophen 5 mg-325 mg oral tablet: 1 tab(s) orally every 8 hours, As Needed -Moderate Pain (4 - 6) MDD:3 tabs  Plavix 75 mg oral tablet: 1 tab(s) orally once a day  simvastatin 40 mg oral tablet: 1 tab(s) orally once a day (at bedtime)  test strips (per patient&#x27;s insurance): Check BG 4x daily (pre-meals and at bedtime). ** Compatible with patient&#x27;s glucometer **  Tradjenta 5 mg oral tablet: 1 tab(s) orally once a day

## 2020-08-29 NOTE — CONSULT NOTE ADULT - ATTENDING COMMENTS
Patient seen and examined, agree with above assessment and plan as transcribed above.    - Normal stress 2018  - Normal LV and R function  - low cardiac risk for podiatric surgery    Tyrese Tierney MD, [facility]  BEEPER [phone]

## 2020-08-29 NOTE — PROGRESS NOTE ADULT - PROBLEM SELECTOR PLAN 1
Ongoing L 2nd toe foot ulcer. Now with worsening pain and clear/blood tinged drainage. Afebrile. No leukcytosis. CRP negative. ESR 34. Xray with 2nd toe soft tissue swelling with focal ulceration at tip. In addition, eroded and indistinct underlying 2nd distal phalangeal tuft cortical margins consistent with radiographic changes of osteomyelitis. No tracking gas collections beyond this region and no additional focal areas of osteomyelitis. S/p vanc/cefepime in the ED.   - Podiatry assessed the patient, recommending:      - JONAH/PVR     - IV antibiotics      - Med clearance   - C/w vanc/cefepime for now   - Medical clearance pending: RCRI score 1 - class II risk, 6.0% risk of death, MI or cardiac arrest. Brown score 1.6%. Can complete <4 METS 2/2 pain. No chest pain or SOB with exertion. no prior surgeries in the past.   TTE noted, with normal LV and RV function, mild diastolic function Ongoing L 2nd toe foot ulcer. Now with worsening pain and clear/blood tinged drainage. Afebrile. No leukcytosis. CRP negative. ESR 34. Xray with 2nd toe soft tissue swelling with focal ulceration at tip. In addition, eroded and indistinct underlying 2nd distal phalangeal tuft cortical margins consistent with radiographic changes of osteomyelitis. No tracking gas collections beyond this region and no additional focal areas of osteomyelitis. S/p vanc/cefepime in the ED.   - Podiatry assessed the patient, recommending left foot partial 2nd ray resection next week,     - JONAH/PVR noted as above, vascular eval appreciated, recommend obtaining  angiogram records from [facility] with Dr. Eduardo Baker    - IV antibiotics , consider ID eval   - C/w vanc/cefepime for now   - Medical clearance pending: RCRI score 1 - class II risk, 6.0% risk of death, MI or cardiac arrest. Brown score 1.6%. Can complete <4 METS 2/2 pain. No chest pain or SOB with exertion. no prior surgeries in the past.   TTE noted, with normal LV and RV function, mild diastolic function  Pt's cardiologist group notified, cardiology clearance noted , pt low risk for planned procedure

## 2020-08-29 NOTE — CONSULT NOTE ADULT - SUBJECTIVE AND OBJECTIVE BOX
HISTORY OF PRESENT ILLNESS: HPI:  Spanish  ID#[id number] - Patient is a 58 y/o Spanish speaking female known to our office with PMH of HTN, HLD, DM2, PAD s/p 2 stents in left leg (Angioplasty performed with Dr. Baker in [facility] in April 2020) with left 2nd toe ulcer who presents for worsening left 2nd toe pain. Cardiology consulted for preop clearance. Patient was previously seen in our office in 2018 and had a normal exercise NST with no evidence of stress induced ischemia or infarct as well as TTE with normal LV/RV function. Patient reports persistent left toe pain. Denies chest pain, SOB, VALENTIN, orthopnea, PND, LE edema, palpitations, syncope, dizziness, abd pain, n/v, cough, fevert/chills.      PAST MEDICAL & SURGICAL HISTORY:  DM (diabetes mellitus), type 2  HLD (hyperlipidemia)  HTN (hypertension)  PAD (peripheral artery disease)  No significant past surgical history    MEDICATIONS:  MEDICATIONS  (STANDING):  amLODIPine   Tablet 2.5 milliGRAM(s) Oral every 24 hours  aspirin  chewable 81 milliGRAM(s) Oral daily  cefepime   IVPB 2000 milliGRAM(s) IV Intermittent every 8 hours  clopidogrel Tablet 75 milliGRAM(s) Oral daily  collagenase Ointment 1 Application(s) Topical daily  dextrose 5%. 1000 milliLiter(s) (50 mL/Hr) IV Continuous <Continuous>  dextrose 50% Injectable 12.5 Gram(s) IV Push once  dextrose 50% Injectable 25 Gram(s) IV Push once  dextrose 50% Injectable 25 Gram(s) IV Push once  enoxaparin Injectable 40 milliGRAM(s) SubCutaneous daily  insulin glargine Injectable (LANTUS) 50 Unit(s) SubCutaneous every morning  insulin glargine Injectable (LANTUS) 50 Unit(s) SubCutaneous at bedtime  insulin lispro (HumaLOG) corrective regimen sliding scale   SubCutaneous three times a day before meals  losartan 100 milliGRAM(s) Oral every 24 hours  simvastatin 40 milliGRAM(s) Oral at bedtime  vancomycin  IVPB 1000 milliGRAM(s) IV Intermittent every 12 hours      Allergies    No Known Allergies    Intolerances        FAMILY HISTORY:    Non-contributary for premature coronary disease or sudden cardiac death    SOCIAL HISTORY:    [ x] Non-smoker  [ ] Smoker  [ ] Alcohol    FLU VACCINE THIS YEAR STARTS IN AUGUST:  [ ] Yes    [ ] No    IF OVER 65 HAVE YOU EVER HAD A PNA VACCINE:  [ ] Yes    [ ] No       [ ] N/A      REVIEW OF SYSTEMS:  [ ]chest pain  [  ]shortness of breath  [  ]palpitations  [  ]syncope  [ ]near syncope [ ]upper extremity weakness   [ ] lower extremity weakness  [  ]diplopia  [  ]altered mental status   [  ]fevers  [ ]chills [ ]nausea  [ ]vomitting  [  ]dysphagia    [ ]abdominal pain  [ ]melena  [ ]BRBPR    [  ]epistaxis  [  ]rash    [ ]lower extremity edema   +left foot pain       [x] All others negative	  [ ] Unable to obtain      LABS:	 	    CARDIAC MARKERS:                              13.3   8.63  )-----------( 318      ( 27 Aug 2020 15:40 )             39.1     Hb Trend:     08-27    141  |  104  |  13  ----------------------------<  211<H>  3.5   |  24  |  0.74    Ca    9.2      27 Aug 2020 15:40    TPro  7.8  /  Alb  4.1  /  TBili  0.4  /  DBili  x   /  AST  20  /  ALT  23  /  AlkPhos  78  08-27    Creatinine Trend: 0.74<--    Coags:      proBNP:   Lipid Profile:   HgA1c:   TSH:         PHYSICAL EXAM:  T(C): 36.7 (08-29-20 @ 14:08), Max: 37.1 (08-28-20 @ 20:40)  HR: 92 (08-29-20 @ 14:08) (81 - 92)  BP: 145/67 (08-29-20 @ 14:08) (135/65 - 154/66)  RR: 17 (08-29-20 @ 14:08) (17 - 18)  SpO2: 98% (08-29-20 @ 14:08) (98% - 99%)  Wt(kg): --   BMI (kg/m2): 26.3 (08-27-20 @ 22:59)  I&O's Summary      Gen: Appears well in NAD  HEENT:  (-)icterus (-)pallor  CV: N S1 S2 1/6 RAY (+)2 Pulses B/l  Resp:  Clear to auscultation B/L, normal effort  GI: (+) BS Soft, NT, ND  Lymph:  (-)Edema, (-)obvious lymphadenopathy  Skin: Warm to touch, Normal turgor  Psych: Appropriate mood and affect      TELEMETRY: None	      ECG: NSR, no acute ST-T changes	    < from: Transthoracic Echocardiogram (08.28.20 @ 09:52) >  CONCLUSIONS:  1. Normal mitral valve. Minimal mitral regurgitation.  2. Normal left ventricular internal dimensions and wall  thicknesses.  3. Normal left ventricular systolic function. No segmental  wall motion abnormalities.  4. Mild diastolic dysfunction (Stage I).  5. Normal right ventricular size and function.    < end of copied text >      RADIOLOGY:         CXR: < from: Xray Chest 1 View AP/PA (08.27.20 @ 18:59) >    IMPRESSION:    Clear lungs.    < end of copied text >      ASSESSMENT/PLAN: Patient is a 58 y/o Spanish speaking female known to our office with PMH of HTN, HLD, DM2, PAD s/p 2 stents in left leg (Angioplasty performed with Dr. Baker in [facility] in April 2020) with left 2nd toe ulcer who presents for worsening left 2nd toe pain. Cardiology consulted for preop clearance. Patient was previously seen in our office in 2018 and had a normal exercise NST with no evidence of stress induced ischemia or infarct as well as TTE with normal LV/RV function.    - No evidence of clinical HF or anginal symptoms  - TTE noted above with normal LV/RV function  - Based on patient's RCRI score, would consider her low cardiac risk for any planned procedures. Patient is optimized from CV perspective for vascular procedure.  - No further inpatient cardiac w/u need prior to any vascular procedures  - Will follow with you    Lucas Gates PA-C  Pager: [phone]

## 2020-08-29 NOTE — DISCHARGE NOTE PROVIDER - CARE PROVIDER_API CALL
Tony Figueroa (DPM)  Podiatric Medicine  [address] [address]  [place] [address]  Phone: [phone]  Fax: [phone]  Follow Up Time: Preet Stauffer (DPM)  Surgery Orthopaedic Surgery  [address] [facility] [address]  [place] [address]  Phone: [phone]  Fax: [phone]  Follow Up Time: Preet Stauffer (DPM)  Surgery Orthopaedic Surgery  [address] [facility] [address]  [place] [address]  Phone: [phone]  Fax: [phone]  Follow Up Time:     Odilia Forman)  Surgery  [address], [address] [place] [address]  Phone: [phone]  Fax: [phone]  Follow Up Time:

## 2020-08-29 NOTE — PROGRESS NOTE ADULT - PROBLEM SELECTOR PLAN 5
Hx of HLD  - F/u lipid panel   - C/w simvastatin 40mg qd Hx of HLD  - lipid panel noted   - C/w simvastatin 40mg qd

## 2020-08-29 NOTE — PROGRESS NOTE ADULT - SUBJECTIVE AND OBJECTIVE BOX
Patient is a 59y old  Female who presents with a chief complaint of     SUBJECTIVE / OVERNIGHT EVENTS:    MEDICATIONS  (STANDING):  amLODIPine   Tablet 2.5 milliGRAM(s) Oral every 24 hours  aspirin  chewable 81 milliGRAM(s) Oral daily  cefepime   IVPB 2000 milliGRAM(s) IV Intermittent every 8 hours  clopidogrel Tablet 75 milliGRAM(s) Oral daily  collagenase Ointment 1 Application(s) Topical daily  dextrose 5%. 1000 milliLiter(s) (50 mL/Hr) IV Continuous <Continuous>  dextrose 50% Injectable 12.5 Gram(s) IV Push once  dextrose 50% Injectable 25 Gram(s) IV Push once  dextrose 50% Injectable 25 Gram(s) IV Push once  enoxaparin Injectable 40 milliGRAM(s) SubCutaneous daily  insulin glargine Injectable (LANTUS) 50 Unit(s) SubCutaneous every morning  insulin glargine Injectable (LANTUS) 50 Unit(s) SubCutaneous at bedtime  insulin lispro (HumaLOG) corrective regimen sliding scale   SubCutaneous three times a day before meals  losartan 100 milliGRAM(s) Oral every 24 hours  simvastatin 40 milliGRAM(s) Oral at bedtime  vancomycin  IVPB 1000 milliGRAM(s) IV Intermittent every 12 hours    MEDICATIONS  (PRN):  acetaminophen   Tablet .. 650 milliGRAM(s) Oral every 6 hours PRN Severe Pain (7 - 10)  dextrose 40% Gel 15 Gram(s) Oral once PRN Blood Glucose LESS THAN 70 milliGRAM(s)/deciliter  glucagon  Injectable 1 milliGRAM(s) IntraMuscular once PRN Glucose LESS THAN 70 milligrams/deciliter      Vital Signs Last 24 Hrs  T(C): 36.7 (29 Aug 2020 05:15), Max: 37.1 (28 Aug 2020 20:40)  T(F): 98.1 (29 Aug 2020 05:15), Max: 98.8 (28 Aug 2020 20:40)  HR: 81 (29 Aug 2020 05:15) (81 - 94)  BP: 135/65 (29 Aug 2020 05:15) (135/65 - 154/66)  BP(mean): --  RR: 18 (29 Aug 2020 05:15) (18 - 18)  SpO2: 99% (29 Aug 2020 05:15) (99% - 99%)  CAPILLARY BLOOD GLUCOSE      POCT Blood Glucose.: 226 mg/dL (28 Aug 2020 22:11)  POCT Blood Glucose.: 243 mg/dL (28 Aug 2020 17:01)  POCT Blood Glucose.: 165 mg/dL (28 Aug 2020 12:09)  POCT Blood Glucose.: 153 mg/dL (28 Aug 2020 08:29)    I&O's Summary      PHYSICAL EXAM:  GENERAL: NAD, well-developed  HEAD:  Atraumatic, Normocephalic  EYES: EOMI, PERRLA, conjunctiva and sclera clear  NECK: Supple, No JVD  CHEST/LUNG: Clear to auscultation bilaterally; No wheeze  HEART: Regular rate and rhythm; No murmurs, rubs, or gallops  ABDOMEN: Soft, Nontender, Nondistended; Bowel sounds present  EXTREMITIES:  2+ Peripheral Pulses, No clubbing, cyanosis, or edema  PSYCH: AAOx3  NEUROLOGY: non-focal  SKIN: No rashes or lesions    LABS:                        13.3   8.63  )-----------( 318      ( 27 Aug 2020 15:40 )             39.1     08-27    141  |  104  |  13  ----------------------------<  211<H>  3.5   |  24  |  0.74    Ca    9.2      27 Aug 2020 15:40    TPro  7.8  /  Alb  4.1  /  TBili  0.4  /  DBili  x   /  AST  20  /  ALT  23  /  AlkPhos  78  08-27    PT/INR - ( 27 Aug 2020 16:46 )   PT: 12.8 SEC;   INR: 1.13                    RADIOLOGY & ADDITIONAL TESTS:    Imaging Personally Reviewed:    Consultant(s) Notes Reviewed:      Care Discussed with Consultants/Other Providers: Patient is a 59y old  Female who presents with a chief complaint of     SUBJECTIVE / OVERNIGHT EVENTS: patient seen and examined by bedside, c/o pain in left foot about 5-6/10, denies headache, dizziness, SOB, CP, Palpitations , N/V/D, abdominal pain  History obtained with daughter translating       MEDICATIONS  (STANDING):  amLODIPine   Tablet 2.5 milliGRAM(s) Oral every 24 hours  aspirin  chewable 81 milliGRAM(s) Oral daily  cefepime   IVPB 2000 milliGRAM(s) IV Intermittent every 8 hours  clopidogrel Tablet 75 milliGRAM(s) Oral daily  collagenase Ointment 1 Application(s) Topical daily  dextrose 5%. 1000 milliLiter(s) (50 mL/Hr) IV Continuous <Continuous>  dextrose 50% Injectable 12.5 Gram(s) IV Push once  dextrose 50% Injectable 25 Gram(s) IV Push once  dextrose 50% Injectable 25 Gram(s) IV Push once  enoxaparin Injectable 40 milliGRAM(s) SubCutaneous daily  insulin glargine Injectable (LANTUS) 50 Unit(s) SubCutaneous every morning  insulin glargine Injectable (LANTUS) 50 Unit(s) SubCutaneous at bedtime  insulin lispro (HumaLOG) corrective regimen sliding scale   SubCutaneous three times a day before meals  losartan 100 milliGRAM(s) Oral every 24 hours  simvastatin 40 milliGRAM(s) Oral at bedtime  vancomycin  IVPB 1000 milliGRAM(s) IV Intermittent every 12 hours    MEDICATIONS  (PRN):  acetaminophen   Tablet .. 650 milliGRAM(s) Oral every 6 hours PRN Severe Pain (7 - 10)  dextrose 40% Gel 15 Gram(s) Oral once PRN Blood Glucose LESS THAN 70 milliGRAM(s)/deciliter  glucagon  Injectable 1 milliGRAM(s) IntraMuscular once PRN Glucose LESS THAN 70 milligrams/deciliter      Vital Signs Last 24 Hrs  T(C): 36.7 (29 Aug 2020 05:15), Max: 37.1 (28 Aug 2020 20:40)  T(F): 98.1 (29 Aug 2020 05:15), Max: 98.8 (28 Aug 2020 20:40)  HR: 81 (29 Aug 2020 05:15) (81 - 94)  BP: 135/65 (29 Aug 2020 05:15) (135/65 - 154/66)  BP(mean): --  RR: 18 (29 Aug 2020 05:15) (18 - 18)  SpO2: 99% (29 Aug 2020 05:15) (99% - 99%)  CAPILLARY BLOOD GLUCOSE      POCT Blood Glucose.: 226 mg/dL (28 Aug 2020 22:11)  POCT Blood Glucose.: 243 mg/dL (28 Aug 2020 17:01)  POCT Blood Glucose.: 165 mg/dL (28 Aug 2020 12:09)  POCT Blood Glucose.: 153 mg/dL (28 Aug 2020 08:29)    I&O's Summary    PHYSICAL EXAM:  GENERAL: NAD, well-developed  HEAD:  Atraumatic, Normocephalic  EYES: EOMI, PERRLA, conjunctiva and sclera clear  CHEST/LUNG: Clear to auscultation bilaterally; No wheeze  HEART: Regular rate and rhythm;   ABDOMEN: Soft, Nontender, Nondistended; Bowel sounds present  EXTREMITIES: diminished  Peripheral Pulses, No clubbing, cyanosis, or edema, left foot with dressing c/d/i  PSYCH: AAOx3  NEUROLOGY: non-focal          LABS:                        13.3   8.63  )-----------( 318      ( 27 Aug 2020 15:40 )             39.1     08-27    141  |  104  |  13  ----------------------------<  211<H>  3.5   |  24  |  0.74    Ca    9.2      27 Aug 2020 15:40    TPro  7.8  /  Alb  4.1  /  TBili  0.4  /  DBili  x   /  AST  20  /  ALT  23  /  AlkPhos  78  08-27    PT/INR - ( 27 Aug 2020 16:46 )   PT: 12.8 SEC;   INR: 1.13                    RADIOLOGY & ADDITIONAL TESTS:    Imaging Personally Reviewed:  < from: VA Duplex Physiol Ext Low 3+ Level, BI. (08.28.20 @ 13:54) >  ------------------------------------------------------------------------  Summary/Impressions:  1.  Right JONAH is mildly decreased (0.82).  Right TBI is  moderately decreased (0.48), with a toe pressure of 84  mmHg.  Findings suggest the presence of distal  infrapopliteal arterial disease in the right lower  extremity.  2.  Left JONAH is moderately decreased (0.61). Left TBI is  moderately decreased (0.31), with a toe pressure of 55  mmHg.  Findings suggest the presence of distal  infrapopliteal arterial disease in the left lower  extremity.  ------------------------------------------------------------------------    < end of copied text >    Consultant(s) Notes Reviewed:  vascular     Care Discussed with Consultants/Other Providers:

## 2020-08-29 NOTE — PROGRESS NOTE ADULT - PROBLEM SELECTOR PLAN 4
Hx of HTN. SBP 180s currently. S4 heard on cardiac exam. Not on an ACE inhibitor.   - C/w norvasc 2.5mg qd  - Start lisinopril 5mg qd Hx of HTN. . Not on an ACE inhibitor.   - C/w norvasc 2.5mg qd and Losartan 100 mg qd

## 2020-08-30 DIAGNOSIS — Z01.818 ENCOUNTER FOR OTHER PREPROCEDURAL EXAMINATION: ICD-10-CM

## 2020-08-30 LAB
ANION GAP SERPL CALC-SCNC: 12 MMO/L — SIGNIFICANT CHANGE UP (ref 7–14)
APTT BLD: 27.2 SEC — SIGNIFICANT CHANGE UP (ref 27–36.3)
BLD GP AB SCN SERPL QL: NEGATIVE — SIGNIFICANT CHANGE UP
BUN SERPL-MCNC: 13 MG/DL — SIGNIFICANT CHANGE UP (ref 7–23)
CALCIUM SERPL-MCNC: 9.8 MG/DL — SIGNIFICANT CHANGE UP (ref 8.4–10.5)
CHLORIDE SERPL-SCNC: 104 MMOL/L — SIGNIFICANT CHANGE UP (ref 98–107)
CO2 SERPL-SCNC: 24 MMOL/L — SIGNIFICANT CHANGE UP (ref 22–31)
CREAT SERPL-MCNC: 0.71 MG/DL — SIGNIFICANT CHANGE UP (ref 0.5–1.3)
GLUCOSE BLDC GLUCOMTR-MCNC: 129 MG/DL — HIGH (ref 70–99)
GLUCOSE BLDC GLUCOMTR-MCNC: 151 MG/DL — HIGH (ref 70–99)
GLUCOSE BLDC GLUCOMTR-MCNC: 168 MG/DL — HIGH (ref 70–99)
GLUCOSE BLDC GLUCOMTR-MCNC: 173 MG/DL — HIGH (ref 70–99)
GLUCOSE BLDC GLUCOMTR-MCNC: 205 MG/DL — HIGH (ref 70–99)
GLUCOSE BLDC GLUCOMTR-MCNC: 269 MG/DL — HIGH (ref 70–99)
GLUCOSE BLDC GLUCOMTR-MCNC: 51 MG/DL — LOW (ref 70–99)
GLUCOSE BLDC GLUCOMTR-MCNC: 56 MG/DL — LOW (ref 70–99)
GLUCOSE BLDC GLUCOMTR-MCNC: 76 MG/DL — SIGNIFICANT CHANGE UP (ref 70–99)
GLUCOSE SERPL-MCNC: 185 MG/DL — HIGH (ref 70–99)
HCT VFR BLD CALC: 40.1 % — SIGNIFICANT CHANGE UP (ref 34.5–45)
HGB BLD-MCNC: 13.2 G/DL — SIGNIFICANT CHANGE UP (ref 11.5–15.5)
INR BLD: 1.08 — SIGNIFICANT CHANGE UP (ref 0.88–1.16)
MAGNESIUM SERPL-MCNC: 1.8 MG/DL — SIGNIFICANT CHANGE UP (ref 1.6–2.6)
MCHC RBC-ENTMCNC: 27.4 PG — SIGNIFICANT CHANGE UP (ref 27–34)
MCHC RBC-ENTMCNC: 32.9 % — SIGNIFICANT CHANGE UP (ref 32–36)
MCV RBC AUTO: 83.2 FL — SIGNIFICANT CHANGE UP (ref 80–100)
NRBC # FLD: 0 K/UL — SIGNIFICANT CHANGE UP (ref 0–0)
ORGANISM # SPEC MICROSCOPIC CNT: SIGNIFICANT CHANGE UP
PHOSPHATE SERPL-MCNC: 2.3 MG/DL — LOW (ref 2.5–4.5)
PLATELET # BLD AUTO: 280 K/UL — SIGNIFICANT CHANGE UP (ref 150–400)
PMV BLD: 9.8 FL — SIGNIFICANT CHANGE UP (ref 7–13)
POTASSIUM SERPL-MCNC: 3.6 MMOL/L — SIGNIFICANT CHANGE UP (ref 3.5–5.3)
POTASSIUM SERPL-SCNC: 3.6 MMOL/L — SIGNIFICANT CHANGE UP (ref 3.5–5.3)
PROTHROM AB SERPL-ACNC: 12.3 SEC — SIGNIFICANT CHANGE UP (ref 10.6–13.6)
RBC # BLD: 4.82 M/UL — SIGNIFICANT CHANGE UP (ref 3.8–5.2)
RBC # FLD: 13.1 % — SIGNIFICANT CHANGE UP (ref 10.3–14.5)
RH IG SCN BLD-IMP: POSITIVE — SIGNIFICANT CHANGE UP
SODIUM SERPL-SCNC: 140 MMOL/L — SIGNIFICANT CHANGE UP (ref 135–145)
WBC # BLD: 9.95 K/UL — SIGNIFICANT CHANGE UP (ref 3.8–10.5)
WBC # FLD AUTO: 9.95 K/UL — SIGNIFICANT CHANGE UP (ref 3.8–10.5)

## 2020-08-30 PROCEDURE — 99233 SBSQ HOSP IP/OBS HIGH 50: CPT

## 2020-08-30 RX ORDER — INSULIN LISPRO 100/ML
VIAL (ML) SUBCUTANEOUS
Refills: 0 | Status: DISCONTINUED | OUTPATIENT
Start: 2020-08-30 | End: 2020-09-04

## 2020-08-30 RX ADMIN — AMLODIPINE BESYLATE 2.5 MILLIGRAM(S): 2.5 TABLET ORAL at 17:32

## 2020-08-30 RX ADMIN — Medication 3: at 17:29

## 2020-08-30 RX ADMIN — CEFEPIME 100 MILLIGRAM(S): 1 INJECTION, POWDER, FOR SOLUTION INTRAMUSCULAR; INTRAVENOUS at 21:19

## 2020-08-30 RX ADMIN — SIMVASTATIN 40 MILLIGRAM(S): 20 TABLET, FILM COATED ORAL at 21:21

## 2020-08-30 RX ADMIN — Medication 2: at 08:55

## 2020-08-30 RX ADMIN — Medication 250 MILLIGRAM(S): at 05:25

## 2020-08-30 RX ADMIN — Medication 81 MILLIGRAM(S): at 12:37

## 2020-08-30 RX ADMIN — CEFEPIME 100 MILLIGRAM(S): 1 INJECTION, POWDER, FOR SOLUTION INTRAMUSCULAR; INTRAVENOUS at 15:40

## 2020-08-30 RX ADMIN — INSULIN GLARGINE 50 UNIT(S): 100 INJECTION, SOLUTION SUBCUTANEOUS at 22:25

## 2020-08-30 RX ADMIN — CEFEPIME 100 MILLIGRAM(S): 1 INJECTION, POWDER, FOR SOLUTION INTRAMUSCULAR; INTRAVENOUS at 05:25

## 2020-08-30 RX ADMIN — LOSARTAN POTASSIUM 100 MILLIGRAM(S): 100 TABLET, FILM COATED ORAL at 21:20

## 2020-08-30 RX ADMIN — INSULIN GLARGINE 50 UNIT(S): 100 INJECTION, SOLUTION SUBCUTANEOUS at 08:54

## 2020-08-30 RX ADMIN — Medication 1 APPLICATION(S): at 12:37

## 2020-08-30 RX ADMIN — Medication 250 MILLIGRAM(S): at 17:29

## 2020-08-30 RX ADMIN — CLOPIDOGREL BISULFATE 75 MILLIGRAM(S): 75 TABLET, FILM COATED ORAL at 12:37

## 2020-08-30 RX ADMIN — ENOXAPARIN SODIUM 40 MILLIGRAM(S): 100 INJECTION SUBCUTANEOUS at 12:37

## 2020-08-30 NOTE — PROGRESS NOTE ADULT - PROBLEM SELECTOR PLAN 4
Hx of HTN.  - C/w norvasc 2.5mg qd and Losartan 100 mg qd Hx of DM. A1C 9.6, indicating poor glycemic control. Patient received total of 112 units of insulin. Noted hypoglycemic event during overnight shift --> likely due to over-correction sliding scale insulin  CORRECTION: Pt NOT taking Lantus 100U Qhs and metformin 1000mg BID at home at home. Pt taking only Lantus 34-35 units at night and metformin in the morning as confirmed by pt and her daughter. Pt was also prescribed Novolog 14 units TID but not taking.    - Hold metformin while inpatient  - Lantus 50U BID   - change from moderate corrective insulin sliding scale to low corrective insulin sliding scale  - endocrine consult and diabetes education

## 2020-08-30 NOTE — CHART NOTE - NSCHARTNOTEFT_GEN_A_CORE
58 y/o F with PMH HTN, HLD, DM2, PAD s/p 2 stents in left leg with left 2nd toe ulcer who presents for worsening left 2nd toe pain. Endocrine called due to labile BG while inpatient and uncontrolled DM with A1C 9.6    Recommendations:  - start Humalog 5 units TID pre-meal  - low dose correction scales pre-meal and bedtime  - check 3AM FSBG and cover with low dose bed time correction scale   - 58 y/o F with PMH HTN, HLD, DM2, PAD s/p 2 stents in left leg with left 2nd toe ulcer who presents for worsening left 2nd toe pain. Endocrine called due to labile BG while inpatient and uncontrolled DM with A1C 9.6.  Very unbalanced regimen of basal and bolus, so for now would start more pre-meal at first and home AM basal to assess what true requirements are.    Recommendations:  - start Humalog 5 units TID pre-meal  - low dose correction scales pre-meal and bedtime  - check 3AM FSBG and cover with low dose bed time correction scale   - hold AM Lantus dose tomorrow 8/31 until Endocrine evaluation in AM  - If hyperglycemia above 250 tomorrow on bmp call endo and we will recommend Lantus dose  - please obtain collateral on her doc and pharmacy to help with dosing   - please page Endocrine with AM glucose     Leela Andino DO, Endocrine Fellow   Pager [phone]. from 9-5PM. After hours and on weekends please call [phone]. 58 y/o F with PMH HTN, HLD, DM2, PAD s/p 2 stents in left leg with left 2nd toe ulcer who presents for worsening left 2nd toe pain. Endocrine called due to labile BG while inpatient and uncontrolled DM with A1C 9.6.  Very unbalanced regimen of basal and bolus, so for now would start more pre-meal at first and home AM basal to assess what true requirements are.    Recommendations:  - start Humalog 5 units TID pre-meal  - low dose correction scales pre-meal and bedtime  - check 3AM FSBG and cover with low dose bed time correction scale   - hold AM Lantus dose tomorrow 8/31 until Endocrine evaluation in AM  - If hyperglycemia above 250 tomorrow on bmp call endo and we will recommend Lantus dose  - please obtain collateral on her doc and pharmacy to help with dosing   - please page Endocrine with AM glucose     D/W Team    Leela Andino DO, Endocrine Fellow   Pager [phone]. from 9-5PM. After hours and on weekends please call [phone]. 60 y/o F with PMH HTN, HLD, DM2, PAD s/p 2 stents in left leg with left 2nd toe ulcer who presents for worsening left 2nd toe pain. Endocrine called due to labile BG while inpatient and uncontrolled DM with A1C 9.6.  Very unbalanced regimen of basal and bolus, so for now would start more pre-meal at first and hold AM basal to assess what true requirements are.    Recommendations:  - start Humalog 5 units TID pre-meal  - low dose correction scales pre-meal and bedtime  - check 3AM FSBG and cover with low dose bed time correction scale   - hold AM Lantus dose tomorrow 8/31 until Endocrine evaluation in AM  - If hyperglycemia above 250 tomorrow on bmp call endo and we will recommend Lantus dose  - please obtain collateral on her doc and pharmacy to help with dosing   - please page Endocrine with AM glucose

## 2020-08-30 NOTE — PROGRESS NOTE ADULT - ASSESSMENT
Ms. Booker is a 58 yo woman with PMHx of HTN, HLD, DM2, PAD s/p 2 stents in left leg with left 2nd toe ulcer presenting with worsening left 2nd toe pain, found to have left foot with second toe gangrene and exposed bone, most consisted w/ clinical osteomyelitis. Ms. Booker is a 58 yo woman with PMHx of HTN, HLD, DM2, PAD s/p 2 stents in left leg with left 2nd toe ulcer presenting with worsening left 2nd toe pain, found to have left foot with second toe gangrene and exposed bone, most consistent w/ clinical osteomyelitis.

## 2020-08-30 NOTE — PROGRESS NOTE ADULT - PROBLEM SELECTOR PLAN 5
Hx of HLD  - C/w simvastatin 40mg qd --> recommend transitioning to high intensity statin if pt without hx of adverse reaction Hx of HTN.  - C/w norvasc 2.5mg qd and Losartan 100 mg qd

## 2020-08-30 NOTE — PROGRESS NOTE ADULT - SUBJECTIVE AND OBJECTIVE BOX
pt seen and examined, no complaints, ROS - .     acetaminophen   Tablet .. 650 milliGRAM(s) Oral every 6 hours PRN  amLODIPine   Tablet 2.5 milliGRAM(s) Oral every 24 hours  aspirin  chewable 81 milliGRAM(s) Oral daily  cefepime   IVPB 2000 milliGRAM(s) IV Intermittent every 8 hours  clopidogrel Tablet 75 milliGRAM(s) Oral daily  collagenase Ointment 1 Application(s) Topical daily  dextrose 40% Gel 15 Gram(s) Oral once PRN  dextrose 5%. 1000 milliLiter(s) IV Continuous <Continuous>  dextrose 50% Injectable 12.5 Gram(s) IV Push once  dextrose 50% Injectable 25 Gram(s) IV Push once  dextrose 50% Injectable 25 Gram(s) IV Push once  enoxaparin Injectable 40 milliGRAM(s) SubCutaneous daily  glucagon  Injectable 1 milliGRAM(s) IntraMuscular once PRN  insulin glargine Injectable (LANTUS) 50 Unit(s) SubCutaneous every morning  insulin glargine Injectable (LANTUS) 50 Unit(s) SubCutaneous at bedtime  insulin lispro (HumaLOG) corrective regimen sliding scale   SubCutaneous three times a day before meals  losartan 100 milliGRAM(s) Oral every 24 hours  simvastatin 40 milliGRAM(s) Oral at bedtime  vancomycin  IVPB 1000 milliGRAM(s) IV Intermittent every 12 hours          Hemoglobin: 13.3 g/dL (08-27 @ 15:40)            Creatinine Trend: 0.74<--    COAGS: PT/INR - ( 30 Aug 2020 05:48 )   PT: 12.3 SEC;   INR: 1.08          PTT - ( 30 Aug 2020 05:48 )  PTT:27.2 SEC          T(C): 37.1 (08-30-20 @ 05:26), Max: 37.1 (08-30-20 @ 05:26)  HR: 88 (08-30-20 @ 05:26) (88 - 92)  BP: 150/68 (08-30-20 @ 05:26) (145/67 - 161/89)  RR: 18 (08-30-20 @ 05:26) (17 - 18)  SpO2: 100% (08-30-20 @ 05:26) (98% - 100%)  Wt(kg): --    I&O's Summary      Gen: Appears well in NAD  HEENT:  (-)icterus (-)pallor  CV: N S1 S2 1/6 RAY (+)2 Pulses B/l  Resp:  Clear to auscultation B/L, normal effort  GI: (+) BS Soft, NT, ND  Lymph:  (-)Edema, (-)obvious lymphadenopathy  Skin: Warm to touch, Normal turgor  Psych: Appropriate mood and affect      TELEMETRY: None	      ECG: NSR, no acute ST-T changes	    < from: Transthoracic Echocardiogram (08.28.20 @ 09:52) >  CONCLUSIONS:  1. Normal mitral valve. Minimal mitral regurgitation.  2. Normal left ventricular internal dimensions and wall  thicknesses.  3. Normal left ventricular systolic function. No segmental  wall motion abnormalities.  4. Mild diastolic dysfunction (Stage I).  5. Normal right ventricular size and function.    < end of copied text >      RADIOLOGY:         CXR: < from: Xray Chest 1 View AP/PA (08.27.20 @ 18:59) >    IMPRESSION:    Clear lungs.    < end of copied text >      ASSESSMENT/PLAN: Patient is a 58 y/o Chinese speaking female known to our office with PMH of HTN, HLD, DM2, PAD s/p 2 stents in left leg (Angioplasty performed with Dr. Baker in [facility] in April 2020) with left 2nd toe ulcer who presents for worsening left 2nd toe pain. Cardiology consulted for preop clearance. Patient was previously seen in our office in 2018 and had a normal exercise NST with no evidence of stress induced ischemia or infarct as well as TTE with normal LV/RV function.  - NST wnl   - No evidence of clinical HF or anginal symptoms  - TTE noted above with normal LV/RV function  - Based on patient's RCRI score, would consider her low cardiac risk for any planned procedures. Patient is optimized from CV perspective for vascular procedure.  - No further inpatient cardiac w/u need prior to any vascular procedures

## 2020-08-30 NOTE — PROGRESS NOTE ADULT - SUBJECTIVE AND OBJECTIVE BOX
Patient is a 59y old  Female who presents with a chief complaint of       SUBJECTIVE / OVERNIGHT EVENTS:      MEDICATIONS  (STANDING):  amLODIPine   Tablet 2.5 milliGRAM(s) Oral every 24 hours  aspirin  chewable 81 milliGRAM(s) Oral daily  cefepime   IVPB 2000 milliGRAM(s) IV Intermittent every 8 hours  clopidogrel Tablet 75 milliGRAM(s) Oral daily  collagenase Ointment 1 Application(s) Topical daily  dextrose 5%. 1000 milliLiter(s) (50 mL/Hr) IV Continuous <Continuous>  dextrose 50% Injectable 12.5 Gram(s) IV Push once  dextrose 50% Injectable 25 Gram(s) IV Push once  dextrose 50% Injectable 25 Gram(s) IV Push once  enoxaparin Injectable 40 milliGRAM(s) SubCutaneous daily  insulin glargine Injectable (LANTUS) 50 Unit(s) SubCutaneous every morning  insulin glargine Injectable (LANTUS) 50 Unit(s) SubCutaneous at bedtime  insulin lispro (HumaLOG) corrective regimen sliding scale   SubCutaneous three times a day before meals  losartan 100 milliGRAM(s) Oral every 24 hours  simvastatin 40 milliGRAM(s) Oral at bedtime  vancomycin  IVPB 1000 milliGRAM(s) IV Intermittent every 12 hours    MEDICATIONS  (PRN):  acetaminophen   Tablet .. 650 milliGRAM(s) Oral every 6 hours PRN Severe Pain (7 - 10)  dextrose 40% Gel 15 Gram(s) Oral once PRN Blood Glucose LESS THAN 70 milliGRAM(s)/deciliter  glucagon  Injectable 1 milliGRAM(s) IntraMuscular once PRN Glucose LESS THAN 70 milligrams/deciliter      Vital Signs Last 24 Hrs  T(C): 36.3 (30 Aug 2020 12:31), Max: 37.1 (30 Aug 2020 05:26)  T(F): 97.4 (30 Aug 2020 12:31), Max: 98.8 (30 Aug 2020 05:26)  HR: 98 (30 Aug 2020 12:31) (88 - 98)  BP: 156/89 (30 Aug 2020 12:31) (148/69 - 161/89)  BP(mean): --  RR: 18 (30 Aug 2020 12:31) (18 - 18)  SpO2: 100% (30 Aug 2020 12:31) (100% - 100%)  CAPILLARY BLOOD GLUCOSE      POCT Blood Glucose.: 129 mg/dL (30 Aug 2020 11:57)  POCT Blood Glucose.: 151 mg/dL (30 Aug 2020 08:34)  POCT Blood Glucose.: 173 mg/dL (30 Aug 2020 02:42)  POCT Blood Glucose.: 168 mg/dL (30 Aug 2020 02:40)  POCT Blood Glucose.: 76 mg/dL (30 Aug 2020 02:23)  POCT Blood Glucose.: 56 mg/dL (30 Aug 2020 02:15)  POCT Blood Glucose.: 51 mg/dL (30 Aug 2020 02:12)  POCT Blood Glucose.: 178 mg/dL (29 Aug 2020 22:16)  POCT Blood Glucose.: 326 mg/dL (29 Aug 2020 17:00)    I&O's Summary        PHYSICAL EXAM  GENERAL: NAD, well-developed  HEAD:  Atraumatic, Normocephalic  EYES: EOMI, PERRLA, conjunctiva and sclera clear  NECK: Supple, No JVD  CHEST/LUNG: Clear to auscultation bilaterally; No wheeze  HEART: Regular rate and rhythm; No murmurs, rubs, or gallops  ABDOMEN: Soft, Nontender, Nondistended; Bowel sounds present  EXTREMITIES:  2+ Peripheral Pulses, No clubbing, cyanosis, or edema  PSYCH: AAOx3  SKIN: No rashes or lesions    LABS:                        13.2   9.95  )-----------( 280      ( 30 Aug 2020 15:19 )             40.1           PT/INR - ( 30 Aug 2020 05:48 )   PT: 12.3 SEC;   INR: 1.08          PTT - ( 30 Aug 2020 05:48 )  PTT:27.2 SEC          RADIOLOGY & ADDITIONAL TESTS:    Imaging Personally Reviewed:  Consultant(s) Notes Reviewed:    Care Discussed with Consultants/Other Providers: Patient is a 59 year old female who presents with a chief complaint of     SUBJECTIVE / OVERNIGHT EVENTS:  Persian translation performed by patient's daughter, who was present at bedside.  Patient with hypoglycemic episode overnight. Pt was symptomatic and stated she was diaphoretic. As per daughter, pt has hypoglycemic events at home as well. The patient typically only checks her FSG once a day. Pt and her daughter confirmed that the patient only takes Lantus 34-35 units at bedtime. However, the daughter provided the pt's medication list and the pt is also prescribed Novolog 14 units TID before meals. Pain in left foot is tolerable and controlled. No fever, chills, chest pain, SOB, n/v or abdominal pain.     MEDICATIONS  (STANDING):  amLODIPine   Tablet 2.5 milliGRAM(s) Oral every 24 hours  aspirin  chewable 81 milliGRAM(s) Oral daily  cefepime   IVPB 2000 milliGRAM(s) IV Intermittent every 8 hours  clopidogrel Tablet 75 milliGRAM(s) Oral daily  collagenase Ointment 1 Application(s) Topical daily  dextrose 5%. 1000 milliLiter(s) (50 mL/Hr) IV Continuous <Continuous>  dextrose 50% Injectable 12.5 Gram(s) IV Push once  dextrose 50% Injectable 25 Gram(s) IV Push once  dextrose 50% Injectable 25 Gram(s) IV Push once  enoxaparin Injectable 40 milliGRAM(s) SubCutaneous daily  insulin glargine Injectable (LANTUS) 50 Unit(s) SubCutaneous every morning  insulin glargine Injectable (LANTUS) 50 Unit(s) SubCutaneous at bedtime  insulin lispro (HumaLOG) corrective regimen sliding scale   SubCutaneous three times a day before meals  losartan 100 milliGRAM(s) Oral every 24 hours  simvastatin 40 milliGRAM(s) Oral at bedtime  vancomycin  IVPB 1000 milliGRAM(s) IV Intermittent every 12 hours    MEDICATIONS  (PRN):  acetaminophen   Tablet .. 650 milliGRAM(s) Oral every 6 hours PRN Severe Pain (7 - 10)  dextrose 40% Gel 15 Gram(s) Oral once PRN Blood Glucose LESS THAN 70 milliGRAM(s)/deciliter  glucagon  Injectable 1 milliGRAM(s) IntraMuscular once PRN Glucose LESS THAN 70 milligrams/deciliter      Vital Signs Last 24 Hrs  T(C): 36.3 (30 Aug 2020 12:31), Max: 37.1 (30 Aug 2020 05:26)  T(F): 97.4 (30 Aug 2020 12:31), Max: 98.8 (30 Aug 2020 05:26)  HR: 98 (30 Aug 2020 12:31) (88 - 98)  BP: 156/89 (30 Aug 2020 12:31) (148/69 - 161/89)  RR: 18 (30 Aug 2020 12:31) (18 - 18)  SpO2: 100% (30 Aug 2020 12:31) (100% - 100%)      CAPILLARY BLOOD GLUCOSE  POCT Blood Glucose.: 129 mg/dL (30 Aug 2020 11:57)  POCT Blood Glucose.: 151 mg/dL (30 Aug 2020 08:34)  POCT Blood Glucose.: 173 mg/dL (30 Aug 2020 02:42)  POCT Blood Glucose.: 168 mg/dL (30 Aug 2020 02:40)  POCT Blood Glucose.: 76 mg/dL (30 Aug 2020 02:23)  POCT Blood Glucose.: 56 mg/dL (30 Aug 2020 02:15)  POCT Blood Glucose.: 51 mg/dL (30 Aug 2020 02:12)  POCT Blood Glucose.: 178 mg/dL (29 Aug 2020 22:16)  POCT Blood Glucose.: 326 mg/dL (29 Aug 2020 17:00)          PHYSICAL EXAM  GENERAL: NAD, overweight, non-toxic appearing  CHEST/LUNG: Clear to auscultation bilaterally; No wheeze  HEART: Regular rate and rhythm  ABDOMEN: Soft, Nontender, Nondistended; Bowel sounds present  EXTREMITIES:  2+ Peripheral Pulses, No clubbing, cyanosis, or edema. Left wrapped in dressing, c/d/i. Planter surface of left  to touch around 2nd toe.  PSYCH: AAOx3, cooperative, calm           LABS:                        13.2   9.95  )-----------( 280      ( 30 Aug 2020 15:19 )             40.1           PT/INR - ( 30 Aug 2020 05:48 )   PT: 12.3 SEC;   INR: 1.08          PTT - ( 30 Aug 2020 05:48 )  PTT:27.2 SEC          Consultant(s) Notes Reviewed:  yes  Care Discussed with Consultants/Other Providers:

## 2020-08-30 NOTE — PROGRESS NOTE ADULT - PROBLEM SELECTOR PLAN 1
Ongoing L 2nd toe foot ulcer/gangrene. Afebrile. No leukcytosis. CRP negative. ESR 34. Xray with 2nd toe soft tissue swelling with focal ulceration at tip. In addition, eroded and indistinct underlying 2nd distal phalangeal tuft cortical margins consistent with radiographic changes of osteomyelitis. No tracking gas collections beyond this region and no additional focal areas of osteomyelitis.   Podiatry following the case  - Podiatry assessed the patient, recommending left foot partial 2nd ray resection. Will f/u regarding date of procedure  - C/w vanc/cefepime for now   - Medical clearance pending: RCRI score 1 - class II risk, 6.0% risk of death, MI or cardiac arrest. Brown score 1.6%. Can complete <4 METS 2/2 pain. No chest pain or SOB with exertion. no prior surgeries in the past.   TTE noted, with normal LV and RV function, mild diastolic function  Pt's cardiologist group notified, cardiology clearance noted , pt low risk for planned procedure Ongoing L 2nd toe foot ulcer/gangrene. Afebrile. No leukocytosis. CRP negative. ESR 34. Xray with 2nd toe soft tissue swelling with focal ulceration at tip. In addition, eroded and indistinct underlying 2nd distal phalangeal tuft cortical margins consistent with radiographic changes of osteomyelitis. No tracking gas collections beyond this region and no additional focal areas of osteomyelitis.   Podiatry following the case  -pending left foot partial 2nd ray resection. Will f/u regarding date of procedure with podiatry  - C/w vanc/cefepime for now

## 2020-08-30 NOTE — PROGRESS NOTE ADULT - PROBLEM SELECTOR PLAN 6
improved    In the ED was 80s-90s. No EKG in the chart.   - F/u EKG Hx of HLD  - d/c simvastatin 40mg qd --> recommend transitioning to high intensity statin, pt was on atorvastatin 40mg daily, will resume

## 2020-08-30 NOTE — PROGRESS NOTE ADULT - PROBLEM SELECTOR PLAN 2
Hx of PAD s/p 2 stents   - C/w ASA and plavix   - JONAH/PVR noted, vascular requesting  angiogram records from [facility] with Dr. Eduardo Baker  - pending arterial duplex of LE b/l  -Podiatry planning left foot partial 2nd ray resection next week, - RCRI score 1 - class II risk, 6.0% risk of death, MI or cardiac arrest. Brown score 1.6%. Can complete <4 METS 2/2 pain. No chest pain or SOB with exertion. no prior surgeries in the past.   TTE noted, with normal LV and RV function, mild diastolic function  Pt's cardiologist group notified, cardiology clearance noted , pt low risk for planned procedure

## 2020-08-31 DIAGNOSIS — I10 ESSENTIAL (PRIMARY) HYPERTENSION: ICD-10-CM

## 2020-08-31 DIAGNOSIS — E78.5 HYPERLIPIDEMIA, UNSPECIFIED: ICD-10-CM

## 2020-08-31 DIAGNOSIS — E11.65 TYPE 2 DIABETES MELLITUS WITH HYPERGLYCEMIA: ICD-10-CM

## 2020-08-31 LAB
ANION GAP SERPL CALC-SCNC: 13 MMO/L — SIGNIFICANT CHANGE UP (ref 7–14)
APTT BLD: 28.5 SEC — SIGNIFICANT CHANGE UP (ref 27–36.3)
BASOPHILS # BLD AUTO: 0.05 K/UL — SIGNIFICANT CHANGE UP (ref 0–0.2)
BASOPHILS NFR BLD AUTO: 0.5 % — SIGNIFICANT CHANGE UP (ref 0–2)
BUN SERPL-MCNC: 12 MG/DL — SIGNIFICANT CHANGE UP (ref 7–23)
CALCIUM SERPL-MCNC: 10 MG/DL — SIGNIFICANT CHANGE UP (ref 8.4–10.5)
CHLORIDE SERPL-SCNC: 104 MMOL/L — SIGNIFICANT CHANGE UP (ref 98–107)
CO2 SERPL-SCNC: 25 MMOL/L — SIGNIFICANT CHANGE UP (ref 22–31)
CREAT SERPL-MCNC: 0.74 MG/DL — SIGNIFICANT CHANGE UP (ref 0.5–1.3)
EOSINOPHIL # BLD AUTO: 0.23 K/UL — SIGNIFICANT CHANGE UP (ref 0–0.5)
EOSINOPHIL NFR BLD AUTO: 2.2 % — SIGNIFICANT CHANGE UP (ref 0–6)
GLUCOSE BLDC GLUCOMTR-MCNC: 100 MG/DL — HIGH (ref 70–99)
GLUCOSE BLDC GLUCOMTR-MCNC: 116 MG/DL — HIGH (ref 70–99)
GLUCOSE BLDC GLUCOMTR-MCNC: 200 MG/DL — HIGH (ref 70–99)
GLUCOSE BLDC GLUCOMTR-MCNC: 283 MG/DL — HIGH (ref 70–99)
GLUCOSE BLDC GLUCOMTR-MCNC: 47 MG/DL — LOW (ref 70–99)
GLUCOSE BLDC GLUCOMTR-MCNC: 49 MG/DL — LOW (ref 70–99)
GLUCOSE BLDC GLUCOMTR-MCNC: 81 MG/DL — SIGNIFICANT CHANGE UP (ref 70–99)
GLUCOSE SERPL-MCNC: 61 MG/DL — LOW (ref 70–99)
HCT VFR BLD CALC: 39.4 % — SIGNIFICANT CHANGE UP (ref 34.5–45)
HGB BLD-MCNC: 12.9 G/DL — SIGNIFICANT CHANGE UP (ref 11.5–15.5)
IMM GRANULOCYTES NFR BLD AUTO: 0.3 % — SIGNIFICANT CHANGE UP (ref 0–1.5)
INR BLD: 1.05 — SIGNIFICANT CHANGE UP (ref 0.88–1.16)
LYMPHOCYTES # BLD AUTO: 2.33 K/UL — SIGNIFICANT CHANGE UP (ref 1–3.3)
LYMPHOCYTES # BLD AUTO: 22.3 % — SIGNIFICANT CHANGE UP (ref 13–44)
MAGNESIUM SERPL-MCNC: 1.9 MG/DL — SIGNIFICANT CHANGE UP (ref 1.6–2.6)
MCHC RBC-ENTMCNC: 27.8 PG — SIGNIFICANT CHANGE UP (ref 27–34)
MCHC RBC-ENTMCNC: 32.7 % — SIGNIFICANT CHANGE UP (ref 32–36)
MCV RBC AUTO: 84.9 FL — SIGNIFICANT CHANGE UP (ref 80–100)
MONOCYTES # BLD AUTO: 0.68 K/UL — SIGNIFICANT CHANGE UP (ref 0–0.9)
MONOCYTES NFR BLD AUTO: 6.5 % — SIGNIFICANT CHANGE UP (ref 2–14)
NEUTROPHILS # BLD AUTO: 7.14 K/UL — SIGNIFICANT CHANGE UP (ref 1.8–7.4)
NEUTROPHILS NFR BLD AUTO: 68.2 % — SIGNIFICANT CHANGE UP (ref 43–77)
NRBC # FLD: 0 K/UL — SIGNIFICANT CHANGE UP (ref 0–0)
PHOSPHATE SERPL-MCNC: 3.2 MG/DL — SIGNIFICANT CHANGE UP (ref 2.5–4.5)
PLATELET # BLD AUTO: 295 K/UL — SIGNIFICANT CHANGE UP (ref 150–400)
PMV BLD: 10 FL — SIGNIFICANT CHANGE UP (ref 7–13)
POTASSIUM SERPL-MCNC: 4 MMOL/L — SIGNIFICANT CHANGE UP (ref 3.5–5.3)
POTASSIUM SERPL-SCNC: 4 MMOL/L — SIGNIFICANT CHANGE UP (ref 3.5–5.3)
PROTHROM AB SERPL-ACNC: 12.1 SEC — SIGNIFICANT CHANGE UP (ref 10.6–13.6)
RBC # BLD: 4.64 M/UL — SIGNIFICANT CHANGE UP (ref 3.8–5.2)
RBC # FLD: 13.1 % — SIGNIFICANT CHANGE UP (ref 10.3–14.5)
SODIUM SERPL-SCNC: 142 MMOL/L — SIGNIFICANT CHANGE UP (ref 135–145)
VANCOMYCIN FLD-MCNC: 12.5 UG/ML — SIGNIFICANT CHANGE UP
VANCOMYCIN TROUGH SERPL-MCNC: 21.5 UG/ML — HIGH (ref 10–20)
WBC # BLD: 10.46 K/UL — SIGNIFICANT CHANGE UP (ref 3.8–10.5)
WBC # FLD AUTO: 10.46 K/UL — SIGNIFICANT CHANGE UP (ref 3.8–10.5)

## 2020-08-31 PROCEDURE — 99254 IP/OBS CNSLTJ NEW/EST MOD 60: CPT | Mod: GC

## 2020-08-31 PROCEDURE — 99232 SBSQ HOSP IP/OBS MODERATE 35: CPT

## 2020-08-31 PROCEDURE — 99232 SBSQ HOSP IP/OBS MODERATE 35: CPT | Mod: GC

## 2020-08-31 PROCEDURE — 99254 IP/OBS CNSLTJ NEW/EST MOD 60: CPT

## 2020-08-31 RX ORDER — CEFEPIME 1 G/1
1000 INJECTION, POWDER, FOR SOLUTION INTRAMUSCULAR; INTRAVENOUS EVERY 8 HOURS
Refills: 0 | Status: DISCONTINUED | OUTPATIENT
Start: 2020-08-31 | End: 2020-09-01

## 2020-08-31 RX ORDER — INSULIN GLARGINE 100 [IU]/ML
18 INJECTION, SOLUTION SUBCUTANEOUS AT BEDTIME
Refills: 0 | Status: DISCONTINUED | OUTPATIENT
Start: 2020-08-31 | End: 2020-09-01

## 2020-08-31 RX ORDER — SODIUM CHLORIDE 9 MG/ML
1000 INJECTION, SOLUTION INTRAVENOUS
Refills: 0 | Status: DISCONTINUED | OUTPATIENT
Start: 2020-08-31 | End: 2020-08-31

## 2020-08-31 RX ORDER — ATORVASTATIN CALCIUM 80 MG/1
40 TABLET, FILM COATED ORAL AT BEDTIME
Refills: 0 | Status: DISCONTINUED | OUTPATIENT
Start: 2020-08-31 | End: 2020-09-04

## 2020-08-31 RX ORDER — DEXTROSE 50 % IN WATER 50 %
25 SYRINGE (ML) INTRAVENOUS ONCE
Refills: 0 | Status: COMPLETED | OUTPATIENT
Start: 2020-08-31 | End: 2020-08-31

## 2020-08-31 RX ORDER — VANCOMYCIN HCL 1 G
750 VIAL (EA) INTRAVENOUS EVERY 12 HOURS
Refills: 0 | Status: DISCONTINUED | OUTPATIENT
Start: 2020-08-31 | End: 2020-09-01

## 2020-08-31 RX ADMIN — CEFEPIME 100 MILLIGRAM(S): 1 INJECTION, POWDER, FOR SOLUTION INTRAMUSCULAR; INTRAVENOUS at 05:00

## 2020-08-31 RX ADMIN — CEFEPIME 100 MILLIGRAM(S): 1 INJECTION, POWDER, FOR SOLUTION INTRAMUSCULAR; INTRAVENOUS at 21:57

## 2020-08-31 RX ADMIN — ENOXAPARIN SODIUM 40 MILLIGRAM(S): 100 INJECTION SUBCUTANEOUS at 12:30

## 2020-08-31 RX ADMIN — Medication 1: at 12:29

## 2020-08-31 RX ADMIN — CLOPIDOGREL BISULFATE 75 MILLIGRAM(S): 75 TABLET, FILM COATED ORAL at 12:30

## 2020-08-31 RX ADMIN — Medication 1 APPLICATION(S): at 13:00

## 2020-08-31 RX ADMIN — Medication 250 MILLIGRAM(S): at 20:35

## 2020-08-31 RX ADMIN — INSULIN GLARGINE 18 UNIT(S): 100 INJECTION, SOLUTION SUBCUTANEOUS at 22:54

## 2020-08-31 RX ADMIN — Medication 81 MILLIGRAM(S): at 12:30

## 2020-08-31 RX ADMIN — Medication 3: at 17:55

## 2020-08-31 RX ADMIN — LOSARTAN POTASSIUM 100 MILLIGRAM(S): 100 TABLET, FILM COATED ORAL at 20:35

## 2020-08-31 RX ADMIN — ATORVASTATIN CALCIUM 40 MILLIGRAM(S): 80 TABLET, FILM COATED ORAL at 22:54

## 2020-08-31 RX ADMIN — AMLODIPINE BESYLATE 2.5 MILLIGRAM(S): 2.5 TABLET ORAL at 20:35

## 2020-08-31 RX ADMIN — CEFEPIME 100 MILLIGRAM(S): 1 INJECTION, POWDER, FOR SOLUTION INTRAMUSCULAR; INTRAVENOUS at 15:25

## 2020-08-31 NOTE — CONSULT NOTE ADULT - ATTENDING COMMENTS
Denise Olmstead (pager [phone])  On evenings and weekends, please call [phone] or page endocrine fellow on call.   Please note that this patient may be followed by different provider tomorrow. If no answer, contact endocrine fellow on call.

## 2020-08-31 NOTE — PROGRESS NOTE ADULT - SUBJECTIVE AND OBJECTIVE BOX
VASCULAR SURGERY DAILY PROGRESS NOTE:    Subjective:  Patient seen and examined. Reports pain is well controlled.    Exam:  Gen: NAD, resting in bed  Resp: Airway patent, non-labored respirations  Abd: Soft, ND, NT  Ext: WWP  Pulses: Palpable femoral pulses bilaterally, Palpable right DP pulse and dopplerable PT, PT/DP dopplerable signal on left, toe very tender to palpation     Vital Signs Last 24 Hrs  T(C): 36.7 (31 Aug 2020 04:57), Max: 36.7 (31 Aug 2020 04:57)  T(F): 98.1 (31 Aug 2020 04:57), Max: 98.1 (31 Aug 2020 04:57)  HR: 91 (31 Aug 2020 04:57) (91 - 98)  BP: 145/72 (31 Aug 2020 04:57) (145/72 - 162/77)  BP(mean): --  RR: 18 (31 Aug 2020 04:57) (17 - 18)  SpO2: 98% (31 Aug 2020 04:57) (97% - 100%)    I&O's Detail    MEDICATIONS  (STANDING):  amLODIPine   Tablet 2.5 milliGRAM(s) Oral every 24 hours  aspirin  chewable 81 milliGRAM(s) Oral daily  cefepime   IVPB 2000 milliGRAM(s) IV Intermittent every 8 hours  clopidogrel Tablet 75 milliGRAM(s) Oral daily  collagenase Ointment 1 Application(s) Topical daily  dextrose 5%. 1000 milliLiter(s) (50 mL/Hr) IV Continuous <Continuous>  dextrose 50% Injectable 12.5 Gram(s) IV Push once  dextrose 50% Injectable 25 Gram(s) IV Push once  dextrose 50% Injectable 25 Gram(s) IV Push once  enoxaparin Injectable 40 milliGRAM(s) SubCutaneous daily  insulin glargine Injectable (LANTUS) 50 Unit(s) SubCutaneous at bedtime  insulin lispro (HumaLOG) corrective regimen sliding scale   SubCutaneous three times a day before meals  losartan 100 milliGRAM(s) Oral every 24 hours  simvastatin 40 milliGRAM(s) Oral at bedtime  vancomycin  IVPB 1000 milliGRAM(s) IV Intermittent every 12 hours    MEDICATIONS  (PRN):  acetaminophen   Tablet .. 650 milliGRAM(s) Oral every 6 hours PRN Severe Pain (7 - 10)  dextrose 40% Gel 15 Gram(s) Oral once PRN Blood Glucose LESS THAN 70 milliGRAM(s)/deciliter  glucagon  Injectable 1 milliGRAM(s) IntraMuscular once PRN Glucose LESS THAN 70 milligrams/deciliter      LABS:                        12.9   10.46 )-----------( 295      ( 31 Aug 2020 04:51 )             39.4     08-31    142  |  104  |  12  ----------------------------<  61<L>  4.0   |  25  |  0.74    Ca    10.0      31 Aug 2020 04:51  Phos  3.2     08-31  Mg     1.9     08-31      PT/INR - ( 31 Aug 2020 04:51 )   PT: 12.1 SEC;   INR: 1.05          PTT - ( 31 Aug 2020 04:51 )  PTT:28.5 SEC

## 2020-08-31 NOTE — PHYSICAL THERAPY INITIAL EVALUATION ADULT - PHYSICAL ASSIST/NONPHYSICAL ASSIST: STAND/SIT, REHAB EVAL
Liver US was normal will f/u PRN given normal enzymes, and liver US   verbal cues/1 person assist/nonverbal cues (demo/gestures)

## 2020-08-31 NOTE — CONSULT NOTE ADULT - ASSESSMENT
58 YO F with hx of HTN, DMT2, uncontrolled, A1c 9.6. Hx of PAD with 2 stents. Now presenting with dry gangrenous ulcer of 2nd L toe. No associated swelling. Xray of the foot with Possible Osteomyelitis. Wound Cx + S. Aureus skin lois.       Recommendations and Plan:  - Decrease Vancomycin dose to 750 q12h  - Decrease Cefepime to 1 mg q8h  - follow up post op cultures  - If clean cultures with no residual gangrenous tissue can DC abx after

## 2020-08-31 NOTE — CONSULT NOTE ADULT - SUBJECTIVE AND OBJECTIVE BOX
HPI:  58 y/o F with PMH HTN, HLD, DM2, PAD s/p 2 stents in left leg with left 2nd toe ulcer who presents for worsening left 2nd toe pain. History is obtained from the patient with an  and from the daughter. She recently had 2 stents placed in the left leg in April. She had an ulcer of the left toe that had not caused her pain but in the past month or so she has had increasing left toe pain. She visited a doctor weekly that examined the toe and did xrays. The family said the xray did not show anything. She now has worsening pain. Has had some clear drainage from the toe and small streaks of blood. No purulent or malodorous fluid. Denies numbness/tingling, weakness of the bilateral lower extremities. No fevers/chills, fatigue. All other ROS negative.     In the ED, VS : SBP 180s, . No EKG performed. Xray of left foot showing possible osteo of the left 2nd toe. Received vanc/cefepime. Podiatry consulted. (27 Aug 2020 18:17)      PAST MEDICAL & SURGICAL HISTORY:  DM (diabetes mellitus), type 2  HLD (hyperlipidemia)  HTN (hypertension)  PAD (peripheral artery disease)  No significant past surgical history      FAMILY HISTORY:      Social History:    Outpatient Medications:    MEDICATIONS  (STANDING):  amLODIPine   Tablet 2.5 milliGRAM(s) Oral every 24 hours  aspirin  chewable 81 milliGRAM(s) Oral daily  cefepime   IVPB 2000 milliGRAM(s) IV Intermittent every 8 hours  clopidogrel Tablet 75 milliGRAM(s) Oral daily  collagenase Ointment 1 Application(s) Topical daily  dextrose 5%. 1000 milliLiter(s) (50 mL/Hr) IV Continuous <Continuous>  dextrose 50% Injectable 12.5 Gram(s) IV Push once  dextrose 50% Injectable 25 Gram(s) IV Push once  dextrose 50% Injectable 25 Gram(s) IV Push once  enoxaparin Injectable 40 milliGRAM(s) SubCutaneous daily  insulin glargine Injectable (LANTUS) 50 Unit(s) SubCutaneous at bedtime  insulin lispro (HumaLOG) corrective regimen sliding scale   SubCutaneous three times a day before meals  losartan 100 milliGRAM(s) Oral every 24 hours  simvastatin 40 milliGRAM(s) Oral at bedtime  vancomycin  IVPB 1000 milliGRAM(s) IV Intermittent every 12 hours    MEDICATIONS  (PRN):  acetaminophen   Tablet .. 650 milliGRAM(s) Oral every 6 hours PRN Severe Pain (7 - 10)  dextrose 40% Gel 15 Gram(s) Oral once PRN Blood Glucose LESS THAN 70 milliGRAM(s)/deciliter  glucagon  Injectable 1 milliGRAM(s) IntraMuscular once PRN Glucose LESS THAN 70 milligrams/deciliter      Allergies    No Known Allergies    Intolerances      Review of Systems:  Constitutional: No fever  Eyes: No blurry vision  Neuro: No tremors  HEENT: No pain  Cardiovascular: No chest pain, palpitations  Respiratory: No SOB, no cough  GI: No nausea, vomiting, abdominal pain  : No dysuria  Skin: no rash  Psych: no depression  Endocrine: no polyuria, polydipsia  Hem/lymph: no swelling  Osteoporosis: no fractures    ALL OTHER SYSTEMS REVIEWED AND NEGATIVE    UNABLE TO OBTAIN    PHYSICAL EXAM:  VITALS: T(C): 36.7 (08-31-20 @ 04:57)  T(F): 98.1 (08-31-20 @ 04:57), Max: 98.1 (08-31-20 @ 04:57)  HR: 91 (08-31-20 @ 04:57) (91 - 98)  BP: 145/72 (08-31-20 @ 04:57) (145/72 - 162/77)  RR:  (17 - 18)  SpO2:  (97% - 100%)  Wt(kg): --  GENERAL: NAD, well-groomed, well-developed  EYES: No proptosis, no lid lag, anicteric  HEENT:  Atraumatic, Normocephalic, moist mucous membranes  THYROID: Normal size, no palpable nodules  RESPIRATORY: Clear to auscultation bilaterally; No rales, rhonchi, wheezing, or rubs  CARDIOVASCULAR: Regular rate and rhythm; No murmurs; no peripheral edema  GI: Soft, nontender, non distended, normal bowel sounds  SKIN: Dry, intact, No rashes or lesions  MUSCULOSKELETAL: Full range of motion, normal strength  NEURO: sensation intact, extraocular movements intact, no tremor, normal reflexes  PSYCH: Alert and oriented x 3, normal affect, normal mood  CUSHING'S SIGNS: no striae    POCT Blood Glucose.: 81 mg/dL (08-31-20 @ 08:02)  POCT Blood Glucose.: 49 mg/dL (08-31-20 @ 07:48)  POCT Blood Glucose.: 47 mg/dL (08-31-20 @ 07:45)  POCT Blood Glucose.: 100 mg/dL (08-31-20 @ 03:13)  POCT Blood Glucose.: 205 mg/dL (08-30-20 @ 22:12)  POCT Blood Glucose.: 269 mg/dL (08-30-20 @ 16:58)  POCT Blood Glucose.: 129 mg/dL (08-30-20 @ 11:57)  POCT Blood Glucose.: 151 mg/dL (08-30-20 @ 08:34)  POCT Blood Glucose.: 173 mg/dL (08-30-20 @ 02:42)  POCT Blood Glucose.: 168 mg/dL (08-30-20 @ 02:40)  POCT Blood Glucose.: 76 mg/dL (08-30-20 @ 02:23)  POCT Blood Glucose.: 56 mg/dL (08-30-20 @ 02:15)  POCT Blood Glucose.: 51 mg/dL (08-30-20 @ 02:12)  POCT Blood Glucose.: 178 mg/dL (08-29-20 @ 22:16)  POCT Blood Glucose.: 326 mg/dL (08-29-20 @ 17:00)  POCT Blood Glucose.: 211 mg/dL (08-29-20 @ 12:06)  POCT Blood Glucose.: 132 mg/dL (08-29-20 @ 08:34)  POCT Blood Glucose.: 226 mg/dL (08-28-20 @ 22:11)  POCT Blood Glucose.: 243 mg/dL (08-28-20 @ 17:01)  POCT Blood Glucose.: 165 mg/dL (08-28-20 @ 12:09)                            12.9   10.46 )-----------( 295      ( 31 Aug 2020 04:51 )             39.4       08-31    142  |  104  |  12  ----------------------------<  61<L>  4.0   |  25  |  0.74    EGFR if : 103  EGFR if non : 89    Ca    10.0      08-31  Mg     1.9     08-31  Phos  3.2     08-31        Thyroid Function Tests:  08-27 @ 15:40 TSH 3.99 FreeT4 -- T3 -- Anti TPO -- Anti Thyroglobulin Ab -- TSI --          08-27 Chol 118<L> LDL 65 HDL 36<L> Trig 156<H>    Radiology: HPI:  60 y/o F with PMH HTN, HLD, DM2, PAD s/p 2 stents in left leg with left 2nd toe ulcer who presents for worsening left 2nd toe pain. History is obtained from the patient with an  and from the daughter. She recently had 2 stents placed in the left leg in April. She had an ulcer of the left toe that had not caused her pain but in the past month or so she has had increasing left toe pain. She visited a doctor weekly that examined the toe and did xrays. The family said the xray did not show anything. She now has worsening pain. Has had some clear drainage from the toe and small streaks of blood. No purulent or malodorous fluid. Denies numbness/tingling, weakness of the bilateral lower extremities. No fevers/chills, fatigue. All other ROS negative.     In the ED, VS : SBP 180s, . No EKG performed. Xray of left foot showing possible osteo of the left 2nd toe. Received vanc/cefepime. Podiatry consulted. (27 Aug 2020 18:17)      Endocrine History: 59 yr old F with Type 2 DM, PAD s/p PCI here with L 2nd OM. History obtained from patient with  and daughter. Endocrien consulted for ucnotrlled DM A1X 9.6%. Mohamud was diagsned with DM2 over 20 years ago. She has been followign with ehr PCO for managmetn. She takes Lantus 36 Units HS and metformin 1 g BID. Her DM is comcpoated by neuropathy. Last optho screenign was 2 years ago -no retinopathy.             PAST MEDICAL & SURGICAL HISTORY:  DM (diabetes mellitus), type 2  HLD (hyperlipidemia)  HTN (hypertension)  PAD (peripheral artery disease)  No significant past surgical history      FAMILY HISTORY:      Social History:    Outpatient Medications:    MEDICATIONS  (STANDING):  amLODIPine   Tablet 2.5 milliGRAM(s) Oral every 24 hours  aspirin  chewable 81 milliGRAM(s) Oral daily  cefepime   IVPB 2000 milliGRAM(s) IV Intermittent every 8 hours  clopidogrel Tablet 75 milliGRAM(s) Oral daily  collagenase Ointment 1 Application(s) Topical daily  dextrose 5%. 1000 milliLiter(s) (50 mL/Hr) IV Continuous <Continuous>  dextrose 50% Injectable 12.5 Gram(s) IV Push once  dextrose 50% Injectable 25 Gram(s) IV Push once  dextrose 50% Injectable 25 Gram(s) IV Push once  enoxaparin Injectable 40 milliGRAM(s) SubCutaneous daily  insulin glargine Injectable (LANTUS) 50 Unit(s) SubCutaneous at bedtime  insulin lispro (HumaLOG) corrective regimen sliding scale   SubCutaneous three times a day before meals  losartan 100 milliGRAM(s) Oral every 24 hours  simvastatin 40 milliGRAM(s) Oral at bedtime  vancomycin  IVPB 1000 milliGRAM(s) IV Intermittent every 12 hours    MEDICATIONS  (PRN):  acetaminophen   Tablet .. 650 milliGRAM(s) Oral every 6 hours PRN Severe Pain (7 - 10)  dextrose 40% Gel 15 Gram(s) Oral once PRN Blood Glucose LESS THAN 70 milliGRAM(s)/deciliter  glucagon  Injectable 1 milliGRAM(s) IntraMuscular once PRN Glucose LESS THAN 70 milligrams/deciliter      Allergies    No Known Allergies    Intolerances      Review of Systems:  Constitutional: No fever  Eyes: No blurry vision  Neuro: No tremors  HEENT: No pain  Cardiovascular: No chest pain, palpitations  Respiratory: No SOB, no cough  GI: No nausea, vomiting, abdominal pain  : No dysuria  Skin: no rash  Psych: no depression  Endocrine: no polyuria, polydipsia  Hem/lymph: no swelling  Osteoporosis: no fractures    ALL OTHER SYSTEMS REVIEWED AND NEGATIVE    UNABLE TO OBTAIN    PHYSICAL EXAM:  VITALS: T(C): 36.7 (08-31-20 @ 04:57)  T(F): 98.1 (08-31-20 @ 04:57), Max: 98.1 (08-31-20 @ 04:57)  HR: 91 (08-31-20 @ 04:57) (91 - 98)  BP: 145/72 (08-31-20 @ 04:57) (145/72 - 162/77)  RR:  (17 - 18)  SpO2:  (97% - 100%)  Wt(kg): --  GENERAL: NAD, well-groomed, well-developed  EYES: No proptosis, no lid lag, anicteric  HEENT:  Atraumatic, Normocephalic, moist mucous membranes  THYROID: Normal size, no palpable nodules  RESPIRATORY: Clear to auscultation bilaterally; No rales, rhonchi, wheezing, or rubs  CARDIOVASCULAR: Regular rate and rhythm; No murmurs; no peripheral edema  GI: Soft, nontender, non distended, normal bowel sounds  SKIN: Dry, intact, No rashes or lesions  MUSCULOSKELETAL: Full range of motion, normal strength  NEURO: sensation intact, extraocular movements intact, no tremor, normal reflexes  PSYCH: Alert and oriented x 3, normal affect, normal mood  CUSHING'S SIGNS: no striae    POCT Blood Glucose.: 81 mg/dL (08-31-20 @ 08:02)  POCT Blood Glucose.: 49 mg/dL (08-31-20 @ 07:48)  POCT Blood Glucose.: 47 mg/dL (08-31-20 @ 07:45)  POCT Blood Glucose.: 100 mg/dL (08-31-20 @ 03:13)  POCT Blood Glucose.: 205 mg/dL (08-30-20 @ 22:12)  POCT Blood Glucose.: 269 mg/dL (08-30-20 @ 16:58)  POCT Blood Glucose.: 129 mg/dL (08-30-20 @ 11:57)  POCT Blood Glucose.: 151 mg/dL (08-30-20 @ 08:34)  POCT Blood Glucose.: 173 mg/dL (08-30-20 @ 02:42)  POCT Blood Glucose.: 168 mg/dL (08-30-20 @ 02:40)  POCT Blood Glucose.: 76 mg/dL (08-30-20 @ 02:23)  POCT Blood Glucose.: 56 mg/dL (08-30-20 @ 02:15)  POCT Blood Glucose.: 51 mg/dL (08-30-20 @ 02:12)  POCT Blood Glucose.: 178 mg/dL (08-29-20 @ 22:16)  POCT Blood Glucose.: 326 mg/dL (08-29-20 @ 17:00)  POCT Blood Glucose.: 211 mg/dL (08-29-20 @ 12:06)  POCT Blood Glucose.: 132 mg/dL (08-29-20 @ 08:34)  POCT Blood Glucose.: 226 mg/dL (08-28-20 @ 22:11)  POCT Blood Glucose.: 243 mg/dL (08-28-20 @ 17:01)  POCT Blood Glucose.: 165 mg/dL (08-28-20 @ 12:09)                            12.9   10.46 )-----------( 295      ( 31 Aug 2020 04:51 )             39.4       08-31    142  |  104  |  12  ----------------------------<  61<L>  4.0   |  25  |  0.74    EGFR if : 103  EGFR if non : 89    Ca    10.0      08-31  Mg     1.9     08-31  Phos  3.2     08-31        Thyroid Function Tests:  08-27 @ 15:40 TSH 3.99 FreeT4 -- T3 -- Anti TPO -- Anti Thyroglobulin Ab -- TSI --          08-27 Chol 118<L> LDL 65 HDL 36<L> Trig 156<H>    Radiology: HPI:  60 y/o F with PMH HTN, HLD, DM2, PAD s/p 2 stents in left leg with left 2nd toe ulcer who presents for worsening left 2nd toe pain. History is obtained from the patient with an  and from the daughter. She recently had 2 stents placed in the left leg in April. She had an ulcer of the left toe that had not caused her pain but in the past month or so she has had increasing left toe pain. She visited a doctor weekly that examined the toe and did xrays. The family said the xray did not show anything. She now has worsening pain. Has had some clear drainage from the toe and small streaks of blood. No purulent or malodorous fluid. Denies numbness/tingling, weakness of the bilateral lower extremities. No fevers/chills, fatigue. All other ROS negative.     In the ED, VS : SBP 180s, . No EKG performed. Xray of left foot showing possible osteo of the left 2nd toe. Received vanc/cefepime. Podiatry consulted. (27 Aug 2020 18:17)      Endocrine History: 59 yr old F with Type 2 DM, PAD s/p PCI here with L 2nd OM. History obtained from patient with  and daughter. Endocrien consulted for ucnotrlled DM A1X 9.6%. Mohamud was diagsned with DM2 over 20 years ago. She has been followign with ehr PCO for managmetn. She takes Lantus 36 Units HS and metformin 1 g BID. She was told to take short actign insulin in the past but was not adherent. Her DM is comcpoated by neuropathy. Last optho screenign was 2 years ago -no retinopathy. Checks her FS only once a day in the -200. Has a lot of rice in her diet. Avoids soda and juice. No hosptiaiton for DKA in [facility].             PAST MEDICAL & SURGICAL HISTORY:  DM (diabetes mellitus), type 2  HLD (hyperlipidemia)  HTN (hypertension)  PAD (peripheral artery disease)  No significant past surgical history      FAMILY HISTORY:No FH of DM      Social History:nonsmoker, nondrinker    Outpatient Medications: Lantus 36 Unist HS, Metformin 1 g BID    MEDICATIONS  (STANDING):  amLODIPine   Tablet 2.5 milliGRAM(s) Oral every 24 hours  aspirin  chewable 81 milliGRAM(s) Oral daily  cefepime   IVPB 2000 milliGRAM(s) IV Intermittent every 8 hours  clopidogrel Tablet 75 milliGRAM(s) Oral daily  collagenase Ointment 1 Application(s) Topical daily  dextrose 5%. 1000 milliLiter(s) (50 mL/Hr) IV Continuous <Continuous>  dextrose 50% Injectable 12.5 Gram(s) IV Push once  dextrose 50% Injectable 25 Gram(s) IV Push once  dextrose 50% Injectable 25 Gram(s) IV Push once  enoxaparin Injectable 40 milliGRAM(s) SubCutaneous daily  insulin glargine Injectable (LANTUS) 50 Unit(s) SubCutaneous at bedtime  insulin lispro (HumaLOG) corrective regimen sliding scale   SubCutaneous three times a day before meals  losartan 100 milliGRAM(s) Oral every 24 hours  simvastatin 40 milliGRAM(s) Oral at bedtime  vancomycin  IVPB 1000 milliGRAM(s) IV Intermittent every 12 hours    MEDICATIONS  (PRN):  acetaminophen   Tablet .. 650 milliGRAM(s) Oral every 6 hours PRN Severe Pain (7 - 10)  dextrose 40% Gel 15 Gram(s) Oral once PRN Blood Glucose LESS THAN 70 milliGRAM(s)/deciliter  glucagon  Injectable 1 milliGRAM(s) IntraMuscular once PRN Glucose LESS THAN 70 milligrams/deciliter      Allergies    No Known Allergies    Intolerances      Review of Systems:  Constitutional: No fever  Eyes: No blurry vision  Neuro: No tremors  HEENT: No pain  Cardiovascular: No chest pain, palpitations  Respiratory: No SOB, no cough  GI: No nausea, vomiting, abdominal pain  : No dysuria  Skin: no rash  Psych: no depression  Endocrine: no polyuria, polydipsia    ALL OTHER SYSTEMS REVIEWED AND NEGATIVE        PHYSICAL EXAM:  VITALS: T(C): 36.7 (08-31-20 @ 04:57)  T(F): 98.1 (08-31-20 @ 04:57), Max: 98.1 (08-31-20 @ 04:57)  HR: 91 (08-31-20 @ 04:57) (91 - 98)  BP: 145/72 (08-31-20 @ 04:57) (145/72 - 162/77)  RR:  (17 - 18)  SpO2:  (97% - 100%)  Wt(kg): --  GENERAL: NAD, well-groomed, well-developed  EYES: No proptosis, no lid lag, anicteric  RESPIRATORY: Clear to auscultation bilaterally; No rales, rhonchi, wheezing, or rubs  CARDIOVASCULAR: Regular rate and rhythm; No murmurs; no peripheral edema  GI: Soft, nontender, non distended, normal bowel sounds  SKIN: L foot dressing clean/dry/intact  MUSCULOSKELETAL: Full range of motion, normal strength  NEURO: sensation intact, extraocular movements intact, no tremor, normal reflexes  PSYCH: Alert and oriented x 3, normal affect, normal mood      POCT Blood Glucose.: 81 mg/dL (08-31-20 @ 08:02)  POCT Blood Glucose.: 49 mg/dL (08-31-20 @ 07:48)  POCT Blood Glucose.: 47 mg/dL (08-31-20 @ 07:45)  POCT Blood Glucose.: 100 mg/dL (08-31-20 @ 03:13)  POCT Blood Glucose.: 205 mg/dL (08-30-20 @ 22:12)  POCT Blood Glucose.: 269 mg/dL (08-30-20 @ 16:58)  POCT Blood Glucose.: 129 mg/dL (08-30-20 @ 11:57)  POCT Blood Glucose.: 151 mg/dL (08-30-20 @ 08:34)  POCT Blood Glucose.: 173 mg/dL (08-30-20 @ 02:42)  POCT Blood Glucose.: 168 mg/dL (08-30-20 @ 02:40)  POCT Blood Glucose.: 76 mg/dL (08-30-20 @ 02:23)  POCT Blood Glucose.: 56 mg/dL (08-30-20 @ 02:15)  POCT Blood Glucose.: 51 mg/dL (08-30-20 @ 02:12)  POCT Blood Glucose.: 178 mg/dL (08-29-20 @ 22:16)  POCT Blood Glucose.: 326 mg/dL (08-29-20 @ 17:00)  POCT Blood Glucose.: 211 mg/dL (08-29-20 @ 12:06)  POCT Blood Glucose.: 132 mg/dL (08-29-20 @ 08:34)  POCT Blood Glucose.: 226 mg/dL (08-28-20 @ 22:11)  POCT Blood Glucose.: 243 mg/dL (08-28-20 @ 17:01)  POCT Blood Glucose.: 165 mg/dL (08-28-20 @ 12:09)                            12.9   10.46 )-----------( 295      ( 31 Aug 2020 04:51 )             39.4       08-31    142  |  104  |  12  ----------------------------<  61<L>  4.0   |  25  |  0.74    EGFR if : 103  EGFR if non : 89    Ca    10.0      08-31  Mg     1.9     08-31  Phos  3.2     08-31        Thyroid Function Tests:  08-27 @ 15:40 TSH 3.99 FreeT4 -- T3 -- Anti TPO -- Anti Thyroglobulin Ab -- TSI --          08-27 Chol 118<L> LDL 65 HDL 36<L> Trig 156<H> HPI:  60 y/o F with PMH HTN, HLD, DM2, PAD s/p 2 stents in left leg with left 2nd toe ulcer who presents for worsening left 2nd toe pain. History is obtained from the patient with an  and from the daughter. She recently had 2 stents placed in the left leg in April. She had an ulcer of the left toe that had not caused her pain but in the past month or so she has had increasing left toe pain. She visited a doctor weekly that examined the toe and did xrays. The family said the xray did not show anything. She now has worsening pain. Has had some clear drainage from the toe and small streaks of blood. No purulent or malodorous fluid. Denies numbness/tingling, weakness of the bilateral lower extremities. No fevers/chills, fatigue. All other ROS negative.     In the ED, VS : SBP 180s, . No EKG performed. Xray of left foot showing possible osteo of the left 2nd toe. Received vanc/cefepime. Podiatry consulted. (27 Aug 2020 18:17)      Endocrine History: 59 yr old F with Type 2 DM, PAD s/p PCI here with L 2nd OM. History obtained from patient with  and daughter. Endocrine consulted for uncontrolled DM A1X 9.6%. Patient was diagnosed with DM2 over 20 years ago. She has been following with her PCP for management. She takes Lantus 36 Units HS and metformin 1 g BID. She was told to take short acting insulin in the past but was not adherent. Her DM is complicated by neuropathy. Last optho screening was 2 years ago -no retinopathy. Checks her FS only once a day in the -200. Has a lot of rice in her diet. Avoids soda and juice. No hospitalization for DKA in the past.             PAST MEDICAL & SURGICAL HISTORY:  DM (diabetes mellitus), type 2  HLD (hyperlipidemia)  HTN (hypertension)  PAD (peripheral artery disease)  No significant past surgical history      FAMILY HISTORY:No FH of DM      Social History:nonsmoker, nondrinker    Outpatient Medications: Lantus 36 Unist HS, Metformin 1 g BID    MEDICATIONS  (STANDING):  amLODIPine   Tablet 2.5 milliGRAM(s) Oral every 24 hours  aspirin  chewable 81 milliGRAM(s) Oral daily  cefepime   IVPB 2000 milliGRAM(s) IV Intermittent every 8 hours  clopidogrel Tablet 75 milliGRAM(s) Oral daily  collagenase Ointment 1 Application(s) Topical daily  dextrose 5%. 1000 milliLiter(s) (50 mL/Hr) IV Continuous <Continuous>  dextrose 50% Injectable 12.5 Gram(s) IV Push once  dextrose 50% Injectable 25 Gram(s) IV Push once  dextrose 50% Injectable 25 Gram(s) IV Push once  enoxaparin Injectable 40 milliGRAM(s) SubCutaneous daily  insulin glargine Injectable (LANTUS) 50 Unit(s) SubCutaneous at bedtime  insulin lispro (HumaLOG) corrective regimen sliding scale   SubCutaneous three times a day before meals  losartan 100 milliGRAM(s) Oral every 24 hours  simvastatin 40 milliGRAM(s) Oral at bedtime  vancomycin  IVPB 1000 milliGRAM(s) IV Intermittent every 12 hours    MEDICATIONS  (PRN):  acetaminophen   Tablet .. 650 milliGRAM(s) Oral every 6 hours PRN Severe Pain (7 - 10)  dextrose 40% Gel 15 Gram(s) Oral once PRN Blood Glucose LESS THAN 70 milliGRAM(s)/deciliter  glucagon  Injectable 1 milliGRAM(s) IntraMuscular once PRN Glucose LESS THAN 70 milligrams/deciliter      Allergies    No Known Allergies    Intolerances      Review of Systems:  Constitutional: No fever  Eyes: No blurry vision  Neuro: No tremors  HEENT: No pain  Cardiovascular: No chest pain, palpitations  Respiratory: No SOB, no cough  GI: No nausea, vomiting, abdominal pain  : No dysuria  Skin: no rash  Psych: no depression  Endocrine: no polyuria, polydipsia    ALL OTHER SYSTEMS REVIEWED AND NEGATIVE        PHYSICAL EXAM:  VITALS: T(C): 36.7 (08-31-20 @ 04:57)  T(F): 98.1 (08-31-20 @ 04:57), Max: 98.1 (08-31-20 @ 04:57)  HR: 91 (08-31-20 @ 04:57) (91 - 98)  BP: 145/72 (08-31-20 @ 04:57) (145/72 - 162/77)  RR:  (17 - 18)  SpO2:  (97% - 100%)  Wt(kg): --  GENERAL: NAD, well-groomed, well-developed  EYES: No proptosis, no lid lag, anicteric  RESPIRATORY: Clear to auscultation bilaterally; No rales, rhonchi, wheezing, or rubs  CARDIOVASCULAR: Regular rate and rhythm; No murmurs; no peripheral edema  GI: Soft, nontender, non distended, normal bowel sounds  SKIN: L foot dressing clean/dry/intact  MUSCULOSKELETAL: Full range of motion, normal strength  NEURO: sensation intact, extraocular movements intact, no tremor, normal reflexes  PSYCH: Alert and oriented x 3, normal affect, normal mood      POCT Blood Glucose.: 81 mg/dL (08-31-20 @ 08:02)  POCT Blood Glucose.: 49 mg/dL (08-31-20 @ 07:48)  POCT Blood Glucose.: 47 mg/dL (08-31-20 @ 07:45)  POCT Blood Glucose.: 100 mg/dL (08-31-20 @ 03:13)  POCT Blood Glucose.: 205 mg/dL (08-30-20 @ 22:12)  POCT Blood Glucose.: 269 mg/dL (08-30-20 @ 16:58)  POCT Blood Glucose.: 129 mg/dL (08-30-20 @ 11:57)  POCT Blood Glucose.: 151 mg/dL (08-30-20 @ 08:34)  POCT Blood Glucose.: 173 mg/dL (08-30-20 @ 02:42)  POCT Blood Glucose.: 168 mg/dL (08-30-20 @ 02:40)  POCT Blood Glucose.: 76 mg/dL (08-30-20 @ 02:23)  POCT Blood Glucose.: 56 mg/dL (08-30-20 @ 02:15)  POCT Blood Glucose.: 51 mg/dL (08-30-20 @ 02:12)  POCT Blood Glucose.: 178 mg/dL (08-29-20 @ 22:16)  POCT Blood Glucose.: 326 mg/dL (08-29-20 @ 17:00)  POCT Blood Glucose.: 211 mg/dL (08-29-20 @ 12:06)  POCT Blood Glucose.: 132 mg/dL (08-29-20 @ 08:34)  POCT Blood Glucose.: 226 mg/dL (08-28-20 @ 22:11)  POCT Blood Glucose.: 243 mg/dL (08-28-20 @ 17:01)  POCT Blood Glucose.: 165 mg/dL (08-28-20 @ 12:09)                            12.9   10.46 )-----------( 295      ( 31 Aug 2020 04:51 )             39.4       08-31    142  |  104  |  12  ----------------------------<  61<L>  4.0   |  25  |  0.74    EGFR if : 103  EGFR if non : 89    Ca    10.0      08-31  Mg     1.9     08-31  Phos  3.2     08-31        Thyroid Function Tests:  08-27 @ 15:40 TSH 3.99 FreeT4 -- T3 -- Anti TPO -- Anti Thyroglobulin Ab -- TSI --          08-27 Chol 118<L> LDL 65 HDL 36<L> Trig 156<H>

## 2020-08-31 NOTE — PROGRESS NOTE ADULT - PROBLEM SELECTOR PLAN 6
Hx of HLD  - d/c simvastatin 40mg qd --> recommend transitioning to high intensity statin, pt was on atorvastatin 40mg daily, will resume

## 2020-08-31 NOTE — PROGRESS NOTE ADULT - PROBLEM SELECTOR PLAN 1
Ongoing L 2nd toe foot ulcer/gangrene. Afebrile. No leukocytosis. CRP negative. ESR 34. Xray with 2nd toe soft tissue swelling with focal ulceration at tip. In addition, eroded and indistinct underlying 2nd distal phalangeal tuft cortical margins consistent with radiographic changes of osteomyelitis. No tracking gas collections beyond this region and no additional focal areas of osteomyelitis.   Podiatry following the case  -pending left foot partial 2nd ray resection. Will f/u regarding date of procedure with podiatry  - C/w vanc/cefepime for now Ongoing L 2nd toe foot ulcer/gangrene. Afebrile. No leukocytosis. CRP negative. ESR 34. Xray with 2nd toe soft tissue swelling with focal ulceration at tip. In addition, eroded and indistinct underlying 2nd distal phalangeal tuft cortical margins consistent with radiographic changes of osteomyelitis. No tracking gas collections beyond this region and no additional focal areas of osteomyelitis.   Podiatry following the case  -pending left foot partial 2nd ray resection. Will f/u regarding date of procedure with podiatry  - C/w vanc/cefepime for now, ID eval requested

## 2020-08-31 NOTE — CONSULT NOTE ADULT - ASSESSMENT
59 yr old F with Type 2 DM unctrolled A1C 9.6% c/b neuropathy, PAD s/p PCI here with L 2nd OM. Patient received Lantus 50 Units last nihgt and had hypogcelymai this AM. 59 yr old F with Type 2 DM uncontrolled A1C 9.6% c/b neuropathy, PAD s/p PCI here with L 2nd OM. Patient received Lantus 50 Units last night and had hypoglycemia this AM.

## 2020-08-31 NOTE — PROGRESS NOTE ADULT - PROBLEM SELECTOR PLAN 4
Hx of DM. A1C 9.6, indicating poor glycemic control. Patient received total of 112 units of insulin. Noted hypoglycemic event during overnight shift --> likely due to over-correction sliding scale insulin  CORRECTION: Pt NOT taking Lantus 100U Qhs and metformin 1000mg BID at home at home. Pt taking only Lantus 34-35 units at night and metformin in the morning as confirmed by pt and her daughter. Pt was also prescribed Novolog 14 units TID but not taking.    - Hold metformin while inpatient  - Lantus 50U BID   - change from moderate corrective insulin sliding scale to low corrective insulin sliding scale  - endocrine consult and diabetes education Hx of DM. A1C 9.6, indicating poor glycemic control.  Noted hypoglycemic event during overnight shift -   Pt NOT taking Lantus 100U Qhs and metformin 1000mg BID at home at home. Pt taking only Lantus 34-35 units at night and metformin in the morning as confirmed by pt and her daughter. Pt was also prescribed Novolog 14 units TID but not taking.    -pt was getting lantus 50 U BID, likely cause of hypoglycemia ,   -endo eval appreciated , rcommend D5 at 30 ml/hr and to discontinue if FS persistently >200, Lantus 18 units qhs and Humalog 6 units AC staring 9  - Hold metformin while inpatient  I  - change from moderate corrective insulin sliding scale to low corrective insulin sliding scale  - diabetes education

## 2020-08-31 NOTE — CONSULT NOTE ADULT - ATTENDING COMMENTS
59F with diabetic toe infection with left 2nd toe ulcer/gangrene/osteomyelitis   -lower cefepime 1 gm iv q8, lower vancomycin 750 mg iv q12  -cont abx for broad GP/GNR coverage   -amputation would be definitive cure  -surgical plans per podiatry   -DM control    Jim Middleton  Attending Physician   Division of Infectious Disease  Pager #[phone]  After 5pm/weekend or no response, call #[phone] 59F with diabetic toe infection with left 2nd toe ulcer/gangrene/osteomyelitis   -lower cefepime 1 gm iv q8, lower vancomycin 750 mg iv q12  -cont abx for broad GP/GNR coverage   -amputation would be definitive cure  -surgical plans per podiatry   -DM control  -hope to stop abx after surgery     Jim Middleton  Attending Physician   Division of Infectious Disease  Pager #[phone]  After 5pm/weekend or no response, call #[phone]

## 2020-08-31 NOTE — PROGRESS NOTE ADULT - SUBJECTIVE AND OBJECTIVE BOX
Patient denies chest pain or shortness of breath.   Review of systems otherwise (-)  	    MEDICATIONS  (STANDING):  amLODIPine   Tablet 2.5 milliGRAM(s) Oral every 24 hours  aspirin  chewable 81 milliGRAM(s) Oral daily  cefepime   IVPB 2000 milliGRAM(s) IV Intermittent every 8 hours  clopidogrel Tablet 75 milliGRAM(s) Oral daily  collagenase Ointment 1 Application(s) Topical daily  dextrose 5%. 1000 milliLiter(s) (50 mL/Hr) IV Continuous <Continuous>  dextrose 5%. 1000 milliLiter(s) (30 mL/Hr) IV Continuous <Continuous>  dextrose 50% Injectable 12.5 Gram(s) IV Push once  dextrose 50% Injectable 25 Gram(s) IV Push once  dextrose 50% Injectable 25 Gram(s) IV Push once  enoxaparin Injectable 40 milliGRAM(s) SubCutaneous daily  insulin glargine Injectable (LANTUS) 50 Unit(s) SubCutaneous at bedtime  insulin lispro (HumaLOG) corrective regimen sliding scale   SubCutaneous three times a day before meals  losartan 100 milliGRAM(s) Oral every 24 hours  simvastatin 40 milliGRAM(s) Oral at bedtime  vancomycin  IVPB 1000 milliGRAM(s) IV Intermittent every 12 hours      LABS:	 	                          12.9   10.46 )-----------( 295      ( 31 Aug 2020 04:51 )             39.4     Hemoglobin: 12.9 g/dL (08-31 @ 04:51)  Hemoglobin: 13.2 g/dL (08-30 @ 15:19)  Hemoglobin: 13.3 g/dL (08-27 @ 15:40)    08-31    142  |  104  |  12  ----------------------------<  61<L>  4.0   |  25  |  0.74    Ca    10.0      31 Aug 2020 04:51  Phos  3.2     08-31  Mg     1.9     08-31      Creatinine Trend: 0.74<--, 0.71<--, 0.74<--  COAGS:   PT/INR - ( 31 Aug 2020 04:51 )   PT: 12.1 SEC;   INR: 1.05          PTT - ( 31 Aug 2020 04:51 )  PTT:28.5 SEC    PHYSICAL EXAM:  T(C): 36.7 (08-31-20 @ 12:21), Max: 36.7 (08-31-20 @ 04:57)  HR: 94 (08-31-20 @ 12:21) (91 - 97)  BP: 166/85 (08-31-20 @ 12:21) (145/72 - 166/85)  RR: 17 (08-31-20 @ 12:21) (17 - 18)  SpO2: 100% (08-31-20 @ 12:21) (97% - 100%)    Gen: Appears well in NAD  HEENT:  (-)icterus (-)pallor  CV: N S1 S2 1/6 RAY (+)2 Pulses B/l  Resp:  Clear to ausculatation B/L, normal effort  GI: (+) BS Soft, NT, ND  Lymph:  (-)Edema, (-)obvious lymphadenopathy  Skin: Warm to touch, Normal turgor  Psych: Appropriate mood and affect    ASSESSMENT/PLAN: 	    Patient is a 60 y/o Green[place]ic speaking female known to our office with PMH of HTN, HLD, DM2, PAD s/p 2 stents in left leg ([facility]) with left 2nd toe ulcer who presents for worsening left 2nd toe pain. Cardiology consulted for preop clearance. Patient was previously seen in our office in 2018 and had a normal exercise NST with no evidence of stress induced ischemia or infarct as well as TTE with normal LV/RV function.    - No evidence of clinical HF or anginal symptoms  - TTE noted above with normal LV/RV function  - Based on patient's RCRI score, would consider her low cardiac risk for any planned procedures. Patient is optimized from CV perspective for vascular procedure.  - No further inpatient cardiac w/u need prior to any vascular procedures  - Will follow with you

## 2020-08-31 NOTE — PHYSICAL THERAPY INITIAL EVALUATION ADULT - GENERAL OBSERVATIONS, REHAB EVAL
Consult received, chart reviewed. Patient received seated at edge of bed, no apparent distress, left foot dressing c/d/i.

## 2020-08-31 NOTE — PROGRESS NOTE ADULT - ASSESSMENT
Ms. Booker is a 60 yo woman with PMHx of HTN, HLD, DM2, PAD s/p 2 stents in left leg with left 2nd toe ulcer presenting with worsening left 2nd toe pain, found to have left foot with second toe gangrene and exposed bone, most consistent w/ clinical osteomyelitis.

## 2020-08-31 NOTE — PROGRESS NOTE ADULT - ASSESSMENT
Pt is 58 yo who presents w/ LF second digit wound to bone  -pt was seen and evaluated   -LF second digit fibronecrotic wound to bone, w/ undermining, no purulence. No periwound erythema, no malodor, etiology likely ischemic. Wound is tender to touch.   -Xray: OM to distal phallanx of left second digit  -Discussed surgical intervention w/ patient for left foot 2nd digit amputation, pt agreeable to plan   - angio at [facility]: julio atherectomy of PTA, balloon angioplasty of L PT and L common illiac artery w/ drug coated balloon angioplasty of L SFA  -booked for L foot partial 2nd ray resection at 1030 AM w/ Dr. Stauffer- will reschedule pending [facility] recs   -Please document medical clearance/ optimization for LF surgery under light sedation w/ local anesthesia  -discussed w/ attending Pt is 58 yo who presents w/ LF second digit wound to bone  -pt was seen and evaluated   -LF second digit fibronecrotic wound to bone, w/ undermining, no purulence. No periwound erythema, no malodor, etiology likely ischemic. Wound is tender to touch.   -Xray: OM to distal phallanx of left second digit  -Discussed surgical intervention w/ patient for left foot 2nd digit amputation, pt agreeable to plan   - angio at [facility]: julio atherectomy of PTA, balloon angioplasty of L PT and L common illiac artery w/ drug coated balloon angioplasty of L SFA  -booked for L foot partial 2nd ray resection at 9/2 1030 AM w/ Dr. Stauffer- will reschedule pending [facility] recs   -Please document medical clearance/ optimization for LF surgery under light sedation w/ local anesthesia  -discussed w/ attending Pt is 60 yo who presents w/ LF second digit wound to bone  -pt was seen and evaluated   -LF second digit fibronecrotic wound to bone, w/ undermining, no purulence. No periwound erythema, no malodor, etiology likely ischemic. Wound is tender to touch.   -Xray: OM to distal phallanx of left second digit  -Discussed surgical intervention w/ patient for left foot 2nd digit amputation, pt agreeable to plan   - angio at [facility]: julio atherectomy of PTA, balloon angioplasty of L PT and L common illiac artery w/ drug coated balloon angioplasty of L SFA  -booked for L foot partial 2nd ray resection at 9/2 1030 AM w/ Dr. Stauffer- will reschedule pending [facility] recs   -discussed w/ attending

## 2020-08-31 NOTE — PHYSICAL THERAPY INITIAL EVALUATION ADULT - PERTINENT HX OF CURRENT PROBLEM, REHAB EVAL
Pt. admitted for worsening left 2nd toe pain with likely osteomyelitis. Per documentation, Xray: OM to distal phalanx of left second digit.

## 2020-08-31 NOTE — PROGRESS NOTE ADULT - ASSESSMENT
60 y/o F with PMH HTN, HLD, DM2, PAD s/p 2 stents in left leg (Angioplasty performed with Dr. Baker in [facility] in April 2020) with left 2nd toe ulcer who presents for worsening left 2nd toe pain. Foot xray with concerns for osteomyelitis. She now has JONAH/PVR that demonstrate left toe pressure of 55 and JONAH of 0.61. She had angioplasty in April 2020 at [facility] but wound worsened and was more painful starting in June 2020. We are consulted for possible need for LLE revascularization.    - Please obtain vascular surgical history records from [facility] with Dr. Eduardo Baker  - Will determine need for repeat angiogram after reviewing records, foot is warm with dopplerable DP/PT  - Podiatry planning for toe amputation, may proceed from vascular perspective  - f/u arterial duplex    Vascular Surgery [id number]

## 2020-08-31 NOTE — PHYSICAL THERAPY INITIAL EVALUATION ADULT - ADDITIONAL COMMENTS
Pt. was left seated at edge of bed post PT Evaluation, no apparent distress, left foot dressing c/d/i, call bell within reach.

## 2020-08-31 NOTE — CONSULT NOTE ADULT - PROBLEM SELECTOR RECOMMENDATION 9
-Patient received too high a dose of lantus last night  -Would start D5 30cc/hr -if FS persistantly >200 over the coruse of the day can discontinue D5  -Goal Glcusoe 100-180  -CHO diet  -FS AC and HS  -Recommend Lantus 18 Units HS and starting 9/1 start Humalog 6 Units TIDAC plus low scal ebefore meals and at bedtime  -Nutrion consult  -Patient should be discahrged on basal/bolus regimen +/- metformin   -She can follow up at [facility] [place]  [address] [facility]  [phone]   Please send scripts for glucometer, test strips, lancets, alcohol swabs, insulin pens and pen needles to patient's pharmacy prior to discharge. -Patient received too high a dose of lantus last night  -Would start D5 30cc/hr -if FS persistently >200 over the course of the day can discontinue D5  -Goal Glucose 100-180  -CHO diet  -FS AC and HS  -Recommend Lantus 18 Units HS and starting 9/1 start Humalog 6 Units TIDAC plus low scale before meals and at bedtime  -Nutrition consult  -Patient should be discharged on basal/bolus regimen +/- metformin   -She can follow up at [facility] [place]  [address] [facility]  [phone]   Please send scripts for glucometer, test strips, lancets, alcohol swabs, insulin pens and pen needles to patient's pharmacy prior to discharge.

## 2020-08-31 NOTE — PROGRESS NOTE ADULT - PROBLEM SELECTOR PLAN 2
- RCRI score 1 - class II risk, 6.0% risk of death, MI or cardiac arrest. Brown score 1.6%. Can complete <4 METS 2/2 pain. No chest pain or SOB with exertion. no prior surgeries in the past.   TTE noted, with normal LV and RV function, mild diastolic function  Pt's cardiologist group notified, cardiology clearance noted , pt low risk for planned procedure

## 2020-08-31 NOTE — PROGRESS NOTE ADULT - PROBLEM SELECTOR PLAN 3
Hx of PAD s/p 2 stents   - C/w ASA and plavix   - JONAH/PVR noted, vascular requesting  angiogram records from [facility] with Dr. Eduardo Baker. Plan to f/u on tomorrow during office hours.  - pending arterial duplex of LE b/l  - Podiatry planning left foot partial 2nd ray resection next week, but will need to determine if pt needs vascular surgery intervention Hx of PAD s/p 2 stents   - C/w ASA and plavix   - JONAH/PVR noted, vascular requesting  angiogram records from [facility] with Dr. Eduardo Baker. Case d/w pt , will get HIPPA consent form signed   - pending arterial duplex of LE b/l  - Podiatry planning left foot partial 2nd ray resection , but will need to determine if pt needs vascular surgery intervention

## 2020-08-31 NOTE — PROGRESS NOTE ADULT - SUBJECTIVE AND OBJECTIVE BOX
Patient is a 59y old  Female who presents with a chief complaint of 2nd toe gangrene of left foot (30 Aug 2020 15:55)      SUBJECTIVE / OVERNIGHT EVENTS:    MEDICATIONS  (STANDING):  amLODIPine   Tablet 2.5 milliGRAM(s) Oral every 24 hours  aspirin  chewable 81 milliGRAM(s) Oral daily  cefepime   IVPB 2000 milliGRAM(s) IV Intermittent every 8 hours  clopidogrel Tablet 75 milliGRAM(s) Oral daily  collagenase Ointment 1 Application(s) Topical daily  dextrose 5%. 1000 milliLiter(s) (50 mL/Hr) IV Continuous <Continuous>  dextrose 50% Injectable 12.5 Gram(s) IV Push once  dextrose 50% Injectable 25 Gram(s) IV Push once  dextrose 50% Injectable 25 Gram(s) IV Push once  enoxaparin Injectable 40 milliGRAM(s) SubCutaneous daily  insulin glargine Injectable (LANTUS) 50 Unit(s) SubCutaneous at bedtime  insulin lispro (HumaLOG) corrective regimen sliding scale   SubCutaneous three times a day before meals  losartan 100 milliGRAM(s) Oral every 24 hours  simvastatin 40 milliGRAM(s) Oral at bedtime  vancomycin  IVPB 1000 milliGRAM(s) IV Intermittent every 12 hours    MEDICATIONS  (PRN):  acetaminophen   Tablet .. 650 milliGRAM(s) Oral every 6 hours PRN Severe Pain (7 - 10)  dextrose 40% Gel 15 Gram(s) Oral once PRN Blood Glucose LESS THAN 70 milliGRAM(s)/deciliter  glucagon  Injectable 1 milliGRAM(s) IntraMuscular once PRN Glucose LESS THAN 70 milligrams/deciliter      Vital Signs Last 24 Hrs  T(C): 36.7 (31 Aug 2020 04:57), Max: 36.7 (31 Aug 2020 04:57)  T(F): 98.1 (31 Aug 2020 04:57), Max: 98.1 (31 Aug 2020 04:57)  HR: 91 (31 Aug 2020 04:57) (91 - 98)  BP: 145/72 (31 Aug 2020 04:57) (145/72 - 162/77)  BP(mean): --  RR: 18 (31 Aug 2020 04:57) (17 - 18)  SpO2: 98% (31 Aug 2020 04:57) (97% - 100%)  CAPILLARY BLOOD GLUCOSE      POCT Blood Glucose.: 81 mg/dL (31 Aug 2020 08:02)  POCT Blood Glucose.: 49 mg/dL (31 Aug 2020 07:48)  POCT Blood Glucose.: 47 mg/dL (31 Aug 2020 07:45)  POCT Blood Glucose.: 100 mg/dL (31 Aug 2020 03:13)  POCT Blood Glucose.: 205 mg/dL (30 Aug 2020 22:12)  POCT Blood Glucose.: 269 mg/dL (30 Aug 2020 16:58)  POCT Blood Glucose.: 129 mg/dL (30 Aug 2020 11:57)    I&O's Summary      PHYSICAL EXAM:  GENERAL: NAD, well-developed  HEAD:  Atraumatic, Normocephalic  EYES: EOMI, PERRLA, conjunctiva and sclera clear  NECK: Supple, No JVD  CHEST/LUNG: Clear to auscultation bilaterally; No wheeze  HEART: Regular rate and rhythm; No murmurs, rubs, or gallops  ABDOMEN: Soft, Nontender, Nondistended; Bowel sounds present  EXTREMITIES:  2+ Peripheral Pulses, No clubbing, cyanosis, or edema  PSYCH: AAOx3  NEUROLOGY: non-focal  SKIN: No rashes or lesions    LABS:                        12.9   10.46 )-----------( 295      ( 31 Aug 2020 04:51 )             39.4     08-31    142  |  104  |  12  ----------------------------<  61<L>  4.0   |  25  |  0.74    Ca    10.0      31 Aug 2020 04:51  Phos  3.2     08-31  Mg     1.9     08-31      PT/INR - ( 31 Aug 2020 04:51 )   PT: 12.1 SEC;   INR: 1.05          PTT - ( 31 Aug 2020 04:51 )  PTT:28.5 SEC          RADIOLOGY & ADDITIONAL TESTS:    Imaging Personally Reviewed:    Consultant(s) Notes Reviewed:      Care Discussed with Consultants/Other Providers: Patient is a 59y old  Female who presents with a chief complaint of 2nd toe gangrene of left foot (30 Aug 2020 15:55)      SUBJECTIVE / OVERNIGHT EVENTS: patient seen and examined by bedside, no acute complain, pain in left foot controlled, denies headache, dizziness, SOB, CP, Palpitations , N/V/D, abdominal pain  pt noted to be hypoglycemic to 40s this morning   History obtained with daughter translating on the phone     MEDICATIONS  (STANDING):  amLODIPine   Tablet 2.5 milliGRAM(s) Oral every 24 hours  aspirin  chewable 81 milliGRAM(s) Oral daily  cefepime   IVPB 2000 milliGRAM(s) IV Intermittent every 8 hours  clopidogrel Tablet 75 milliGRAM(s) Oral daily  collagenase Ointment 1 Application(s) Topical daily  dextrose 5%. 1000 milliLiter(s) (50 mL/Hr) IV Continuous <Continuous>  dextrose 50% Injectable 12.5 Gram(s) IV Push once  dextrose 50% Injectable 25 Gram(s) IV Push once  dextrose 50% Injectable 25 Gram(s) IV Push once  enoxaparin Injectable 40 milliGRAM(s) SubCutaneous daily  insulin glargine Injectable (LANTUS) 50 Unit(s) SubCutaneous at bedtime  insulin lispro (HumaLOG) corrective regimen sliding scale   SubCutaneous three times a day before meals  losartan 100 milliGRAM(s) Oral every 24 hours  simvastatin 40 milliGRAM(s) Oral at bedtime  vancomycin  IVPB 1000 milliGRAM(s) IV Intermittent every 12 hours    MEDICATIONS  (PRN):  acetaminophen   Tablet .. 650 milliGRAM(s) Oral every 6 hours PRN Severe Pain (7 - 10)  dextrose 40% Gel 15 Gram(s) Oral once PRN Blood Glucose LESS THAN 70 milliGRAM(s)/deciliter  glucagon  Injectable 1 milliGRAM(s) IntraMuscular once PRN Glucose LESS THAN 70 milligrams/deciliter      Vital Signs Last 24 Hrs  T(C): 36.7 (31 Aug 2020 04:57), Max: 36.7 (31 Aug 2020 04:57)  T(F): 98.1 (31 Aug 2020 04:57), Max: 98.1 (31 Aug 2020 04:57)  HR: 91 (31 Aug 2020 04:57) (91 - 98)  BP: 145/72 (31 Aug 2020 04:57) (145/72 - 162/77)  BP(mean): --  RR: 18 (31 Aug 2020 04:57) (17 - 18)  SpO2: 98% (31 Aug 2020 04:57) (97% - 100%)  CAPILLARY BLOOD GLUCOSE      POCT Blood Glucose.: 81 mg/dL (31 Aug 2020 08:02)  POCT Blood Glucose.: 49 mg/dL (31 Aug 2020 07:48)  POCT Blood Glucose.: 47 mg/dL (31 Aug 2020 07:45)  POCT Blood Glucose.: 100 mg/dL (31 Aug 2020 03:13)  POCT Blood Glucose.: 205 mg/dL (30 Aug 2020 22:12)  POCT Blood Glucose.: 269 mg/dL (30 Aug 2020 16:58)  POCT Blood Glucose.: 129 mg/dL (30 Aug 2020 11:57)    I&O's Summary        PHYSICAL EXAM  GENERAL: NAD, overweight, non-toxic appearing  CHEST/LUNG: Clear to auscultation bilaterally; No wheeze  HEART: Regular rate and rhythm  ABDOMEN: Soft, Nontender, Nondistended; Bowel sounds present  EXTREMITIES:  2+ Peripheral Pulses, No clubbing, cyanosis, or edema. Left wrapped in dressing, c/d/i. Planter surface of left  to touch around 2nd toe.  PSYCH: AAOx3, cooperative, calm       LABS:                        12.9   10.46 )-----------( 295      ( 31 Aug 2020 04:51 )             39.4     08-31    142  |  104  |  12  ----------------------------<  61<L>  4.0   |  25  |  0.74    Ca    10.0      31 Aug 2020 04:51  Phos  3.2     08-31  Mg     1.9     08-31      PT/INR - ( 31 Aug 2020 04:51 )   PT: 12.1 SEC;   INR: 1.05          PTT - ( 31 Aug 2020 04:51 )  PTT:28.5 SEC          RADIOLOGY & ADDITIONAL TESTS:    Imaging Personally Reviewed:    Consultant(s) Notes Reviewed:  podiatry, vascular, cardiology     Care Discussed with Consultants/Other Providers:

## 2020-08-31 NOTE — PROVIDER CONTACT NOTE (CHANGE IN STATUS NOTIFICATION) - SITUATION
Pt's FS first was 49, Pt consumed after 8ounces of soda. FS re checked 15 min later resulting in 81.

## 2020-08-31 NOTE — CONSULT NOTE ADULT - SUBJECTIVE AND OBJECTIVE BOX
Patient is a 59y old  Female who presents with a chief complaint of OM (31 Aug 2020 11:16)    HPI:  60 y/o F with PMH HTN, HLD, DM2, PAD s/p 2 stents in left leg with left 2nd toe ulcer who presents for worsening left 2nd toe pain. History is obtained from the patient with an  and from the daughter. She recently had 2 stents placed in the left leg in April. She had an ulcer of the left toe that had not caused her pain but in the past month or so she has had increasing left toe pain. She visited a doctor weekly that examined the toe and did xrays. The family said the xray did not show anything. She now has worsening pain. Has had some clear drainage from the toe and small streaks of blood. No purulent or malodorous fluid. Denies numbness/tingling, weakness of the bilateral lower extremities. No fevers/chills, fatigue. All other ROS negative.     In the ED, VS : SBP 180s, . No EKG performed. Xray of left foot showing possible osteo of the left 2nd toe. Received vanc/cefepime. Podiatry consulted. (27 Aug 2020 18:17)      PAST MEDICAL & SURGICAL HISTORY:  DM (diabetes mellitus), type 2  HLD (hyperlipidemia)  HTN (hypertension)  PAD (peripheral artery disease)  No significant past surgical history      Social history:  No alcohol use  No Smoking    REVIEW OF SYSTEMS  General:	Denies any malaise fatigue or chills. Fevers absent    Skin: small Circular skin changes on Left shin.   	  Ophthalmologic: Denies any visual complaints, discharge redness or photophobia  	  ENMT:No nasal discharge, headache, sinus congestion or throat pain.No dental complaints    Respiratory and Thorax: No cough, sputum or chest pain. Denies shortness of breath  	  Cardiovascular:	No chest pain,palpitaions or dizziness    Gastrointestinal:	NO nausea, abdominal pain or diarrhea.    Genitourinary:	No dysuria,frequency. No flank pain    Musculoskeletal:	No joint swelling, Left 2nd toe pain and ulcer     Neurological:No confusion,dizziness. No extremity weakness.No bladder or bowel incontinence	    Psychiatric: No delusions or hallucinations	    Hematology/Lymphatics:	No LN swelling.No gum bleeding     Endocrine:	No recent weight gain or loss.No abnormal heat/cold intolerance    Allergic/Immunologic:	No hives or rash   Allergies    No Known Allergies    Intolerances        Antimicrobials:    cefepime   IVPB 2000 milliGRAM(s) IV Intermittent every 8 hours  vancomycin  IVPB 1000 milliGRAM(s) IV Intermittent every 12 hours        Vital Signs Last 24 Hrs  T(C): 36.7 (31 Aug 2020 12:21), Max: 36.7 (31 Aug 2020 04:57)  T(F): 98.1 (31 Aug 2020 12:21), Max: 98.1 (31 Aug 2020 04:57)  HR: 94 (31 Aug 2020 12:21) (91 - 97)  BP: 166/85 (31 Aug 2020 12:21) (145/72 - 166/85)  BP(mean): --  RR: 17 (31 Aug 2020 12:21) (17 - 18)  SpO2: 100% (31 Aug 2020 12:21) (97% - 100%)    PHYSICAL EXAM:Pleasant patient in no acute distress.      Constitutional:Comfortable.Awake and alert  No cachexia     Eyes:PERRL EOMI.NO discharge or conjunctival injection    ENMT:No sinus tenderness.No thrush.No pharyngeal exudate or erythema.Fair dental hygiene    Neck:Supple,No LN,no JVD      Respiratory: Good air entry bilaterally, CTA    Cardiovascular:S1 S2 wnl, No murmurs,rub or gallops    Gastrointestinal: Soft BS(+) no tenderness no masses ,No rebound or guarding    Genitourinary :No CVA tendereness     Extremities:No cyanosis,clubbing or edema., Left 2nd toe with Gangrenous ulcer on tip of 2nd toe, no swelling or redness.     Vascular:peripheral Decreased LE pulses     Neurological:AAO X 3,No grossly focal deficits    Skin:No rash     Lymph Nodes: No palpable LNs    Musculoskeletal:No joint swelling or LOM    Psychiatric:Affect normal.                                12.9   10.46 )-----------( 295      ( 31 Aug 2020 04:51 )             39.4         08-31    142  |  104  |  12  ----------------------------<  61<L>  4.0   |  25  |  0.74    Ca    10.0      31 Aug 2020 04:51  Phos  3.2     08-31  Mg     1.9     08-31        RECENT CULTURES:  08-27 @ 22:28  .Other wound L 2nd toe  Staphylococcus aureus  Staphylococcus aureus  DANY    Numerous Staphylococcus aureus  Normal skin lois isolated  --      MICROBIOLOGY:  Culture Results:   Wund culture Numerous Staphylococcus aureus  Normal skin lois isolated (08-27 @ 22:28)          Radiology:  Xray of Left Foot: 2nd toe soft tissue swelling with focal ulceration at tip. In addition, eroded and indistinct underlying 2nd distal phalangeal tuft cortical margins consistent with radiographic changes of osteomyelitis    Assessment:  60 YO F with hx of HTN, DMT2, uncontrolled, A1c 9.6. Hx of PAD with 2 stents. Now presenting with dry gangrenous ulcer of 2nd L toe. No associated swelling. Xray of the foot with Possible Osteomyelitis. Wound Cx + S. Aureus skin lois.       Recommendations and Plan:  - Decrease Vancomycin dose to 750 q12h  - Decrease Cefepime to 1 mg q8h  - follow up post op cultures  - If clean cultures with no residual gangrenous tissue can DC abx after Patient is a 59y old  Female who presents with a chief complaint of OM (31 Aug 2020 11:16)    HPI:  58 y/o F with PMH HTN, HLD, DM2, PAD s/p 2 stents in left leg with left 2nd toe ulcer who presents for worsening left 2nd toe pain. History is obtained from the patient with an  and from the daughter. She recently had 2 stents placed in the left leg in April. She had an ulcer of the left toe that had not caused her pain but in the past month or so she has had increasing left toe pain. She visited a doctor weekly that examined the toe and did xrays. The family said the xray did not show anything. She now has worsening pain. Has had some clear drainage from the toe and small streaks of blood. No purulent or malodorous fluid. Denies numbness/tingling, weakness of the bilateral lower extremities. No fevers/chills, fatigue. All other ROS negative.     In the ED, VS : SBP 180s, . No EKG performed. Xray of left foot showing possible osteo of the left 2nd toe. Received vanc/cefepime. Podiatry consulted. (27 Aug 2020 18:17)      PAST MEDICAL & SURGICAL HISTORY:  DM (diabetes mellitus), type 2  HLD (hyperlipidemia)  HTN (hypertension)  PAD (peripheral artery disease)  No significant past surgical history      Social history:  No alcohol use  No Smoking    Family Hx:  Parents - DM     REVIEW OF SYSTEMS  General:	Denies any malaise fatigue or chills. Fevers absent    Skin: small Circular skin changes on Left shin.   	  Ophthalmologic: Denies any visual complaints, discharge redness or photophobia  	  ENMT:No nasal discharge, headache, sinus congestion or throat pain.No dental complaints    Respiratory and Thorax: No cough, sputum or chest pain. Denies shortness of breath  	  Cardiovascular:	No chest pain,palpitaions or dizziness    Gastrointestinal:	NO nausea, abdominal pain or diarrhea.    Genitourinary:	No dysuria,frequency. No flank pain    Musculoskeletal:	No joint swelling, Left 2nd toe pain and ulcer     Neurological:No confusion,dizziness. No extremity weakness.No bladder or bowel incontinence	    Psychiatric: No delusions or hallucinations	    Hematology/Lymphatics:	No LN swelling.No gum bleeding     Endocrine:	No recent weight gain or loss.No abnormal heat/cold intolerance    Allergic/Immunologic:	No hives or rash   Allergies    No Known Allergies    Intolerances        Antimicrobials:    cefepime   IVPB 2000 milliGRAM(s) IV Intermittent every 8 hours  vancomycin  IVPB 1000 milliGRAM(s) IV Intermittent every 12 hours        Vital Signs Last 24 Hrs  T(C): 36.7 (31 Aug 2020 12:21), Max: 36.7 (31 Aug 2020 04:57)  T(F): 98.1 (31 Aug 2020 12:21), Max: 98.1 (31 Aug 2020 04:57)  HR: 94 (31 Aug 2020 12:21) (91 - 97)  BP: 166/85 (31 Aug 2020 12:21) (145/72 - 166/85)  BP(mean): --  RR: 17 (31 Aug 2020 12:21) (17 - 18)  SpO2: 100% (31 Aug 2020 12:21) (97% - 100%)    PHYSICAL EXAM:Pleasant patient in no acute distress.      Constitutional:Comfortable.Awake and alert  No cachexia     Eyes:PERRL EOMI.NO discharge or conjunctival injection    ENMT:No sinus tenderness.No thrush.No pharyngeal exudate or erythema.Fair dental hygiene    Neck:Supple,No LN,no JVD      Respiratory: Good air entry bilaterally, CTA    Cardiovascular:S1 S2 wnl, No murmurs,rub or gallops    Gastrointestinal: Soft BS(+) no tenderness no masses ,No rebound or guarding    Genitourinary :No CVA tendereness     Extremities:No cyanosis,clubbing or edema., Left 2nd toe with Gangrenous ulcer on tip of 2nd toe, no swelling or redness.     Vascular:peripheral Decreased LE pulses     Neurological:AAO X 3,No grossly focal deficits    Skin:No rash     Lymph Nodes: No palpable LNs    Musculoskeletal:No joint swelling or LOM    Psychiatric:Affect normal.                                12.9   10.46 )-----------( 295      ( 31 Aug 2020 04:51 )             39.4         08-31    142  |  104  |  12  ----------------------------<  61<L>  4.0   |  25  |  0.74    Ca    10.0      31 Aug 2020 04:51  Phos  3.2     08-31  Mg     1.9     08-31        RECENT CULTURES:  08-27 @ 22:28  .Other wound L 2nd toe  Staphylococcus aureus  Staphylococcus aureus  DANY    Numerous Staphylococcus aureus  Normal skin lois isolated  --      MICROBIOLOGY:  Culture Results:   Eleuteriond culture Numerous Staphylococcus aureus  Normal skin lois isolated (08-27 @ 22:28)          Radiology:  Xray of Left Foot: 2nd toe soft tissue swelling with focal ulceration at tip. In addition, eroded and indistinct underlying 2nd distal phalangeal tuft cortical margins consistent with radiographic changes of osteomyelitis

## 2020-09-01 ENCOUNTER — TRANSCRIPTION ENCOUNTER (OUTPATIENT)
Age: 60
End: 2020-09-01

## 2020-09-01 LAB
GLUCOSE BLDC GLUCOMTR-MCNC: 101 MG/DL — HIGH (ref 70–99)
GLUCOSE BLDC GLUCOMTR-MCNC: 110 MG/DL — HIGH (ref 70–99)
GLUCOSE BLDC GLUCOMTR-MCNC: 177 MG/DL — HIGH (ref 70–99)
GLUCOSE BLDC GLUCOMTR-MCNC: 239 MG/DL — HIGH (ref 70–99)
GLUCOSE BLDC GLUCOMTR-MCNC: 328 MG/DL — HIGH (ref 70–99)
GLUCOSE BLDC GLUCOMTR-MCNC: 39 MG/DL — CRITICAL LOW (ref 70–99)
GLUCOSE BLDC GLUCOMTR-MCNC: 40 MG/DL — CRITICAL LOW (ref 70–99)
GLUCOSE BLDC GLUCOMTR-MCNC: 91 MG/DL — SIGNIFICANT CHANGE UP (ref 70–99)
GLUCOSE BLDC GLUCOMTR-MCNC: 94 MG/DL — SIGNIFICANT CHANGE UP (ref 70–99)

## 2020-09-01 PROCEDURE — 99232 SBSQ HOSP IP/OBS MODERATE 35: CPT | Mod: GC

## 2020-09-01 PROCEDURE — 99233 SBSQ HOSP IP/OBS HIGH 50: CPT

## 2020-09-01 PROCEDURE — 99232 SBSQ HOSP IP/OBS MODERATE 35: CPT

## 2020-09-01 RX ORDER — SODIUM CHLORIDE 9 MG/ML
1000 INJECTION, SOLUTION INTRAVENOUS
Refills: 0 | Status: DISCONTINUED | OUTPATIENT
Start: 2020-09-01 | End: 2020-09-01

## 2020-09-01 RX ORDER — DEXTROSE 50 % IN WATER 50 %
15 SYRINGE (ML) INTRAVENOUS ONCE
Refills: 0 | Status: DISCONTINUED | OUTPATIENT
Start: 2020-09-01 | End: 2020-09-04

## 2020-09-01 RX ORDER — CEFAZOLIN SODIUM 1 G
1000 VIAL (EA) INJECTION EVERY 8 HOURS
Refills: 0 | Status: DISCONTINUED | OUTPATIENT
Start: 2020-09-01 | End: 2020-09-04

## 2020-09-01 RX ORDER — INSULIN LISPRO 100/ML
5 VIAL (ML) SUBCUTANEOUS
Refills: 0 | Status: DISCONTINUED | OUTPATIENT
Start: 2020-09-01 | End: 2020-09-03

## 2020-09-01 RX ORDER — INSULIN GLARGINE 100 [IU]/ML
9 INJECTION, SOLUTION SUBCUTANEOUS AT BEDTIME
Refills: 0 | Status: DISCONTINUED | OUTPATIENT
Start: 2020-09-01 | End: 2020-09-03

## 2020-09-01 RX ORDER — DEXTROSE 50 % IN WATER 50 %
15 SYRINGE (ML) INTRAVENOUS ONCE
Refills: 0 | Status: COMPLETED | OUTPATIENT
Start: 2020-09-01 | End: 2020-09-01

## 2020-09-01 RX ADMIN — LOSARTAN POTASSIUM 100 MILLIGRAM(S): 100 TABLET, FILM COATED ORAL at 20:49

## 2020-09-01 RX ADMIN — Medication 2: at 12:25

## 2020-09-01 RX ADMIN — Medication 250 MILLIGRAM(S): at 09:00

## 2020-09-01 RX ADMIN — Medication 100 MILLIGRAM(S): at 22:02

## 2020-09-01 RX ADMIN — Medication 4: at 17:22

## 2020-09-01 RX ADMIN — CEFEPIME 100 MILLIGRAM(S): 1 INJECTION, POWDER, FOR SOLUTION INTRAMUSCULAR; INTRAVENOUS at 05:42

## 2020-09-01 RX ADMIN — CEFEPIME 100 MILLIGRAM(S): 1 INJECTION, POWDER, FOR SOLUTION INTRAMUSCULAR; INTRAVENOUS at 14:26

## 2020-09-01 RX ADMIN — AMLODIPINE BESYLATE 2.5 MILLIGRAM(S): 2.5 TABLET ORAL at 18:23

## 2020-09-01 RX ADMIN — SODIUM CHLORIDE 75 MILLILITER(S): 9 INJECTION, SOLUTION INTRAVENOUS at 09:08

## 2020-09-01 RX ADMIN — Medication 81 MILLIGRAM(S): at 11:48

## 2020-09-01 RX ADMIN — Medication 15 GRAM(S): at 03:35

## 2020-09-01 RX ADMIN — Medication 5 UNIT(S): at 17:22

## 2020-09-01 RX ADMIN — ENOXAPARIN SODIUM 40 MILLIGRAM(S): 100 INJECTION SUBCUTANEOUS at 11:48

## 2020-09-01 RX ADMIN — ATORVASTATIN CALCIUM 40 MILLIGRAM(S): 80 TABLET, FILM COATED ORAL at 20:49

## 2020-09-01 RX ADMIN — CLOPIDOGREL BISULFATE 75 MILLIGRAM(S): 75 TABLET, FILM COATED ORAL at 11:49

## 2020-09-01 RX ADMIN — INSULIN GLARGINE 9 UNIT(S): 100 INJECTION, SOLUTION SUBCUTANEOUS at 22:03

## 2020-09-01 NOTE — PROGRESS NOTE ADULT - SUBJECTIVE AND OBJECTIVE BOX
S: no chest pain or shortness of breath.   Review of systems otherwise (-)  	  acetaminophen   Tablet .. 650 milliGRAM(s) Oral every 6 hours PRN  amLODIPine   Tablet 2.5 milliGRAM(s) Oral every 24 hours  aspirin  chewable 81 milliGRAM(s) Oral daily  atorvastatin 40 milliGRAM(s) Oral at bedtime  cefepime   IVPB 1000 milliGRAM(s) IV Intermittent every 8 hours  clopidogrel Tablet 75 milliGRAM(s) Oral daily  collagenase Ointment 1 Application(s) Topical daily  dextrose 40% Gel 15 Gram(s) Oral once PRN  dextrose 40% Gel 15 Gram(s) Oral once PRN  dextrose 5% + sodium chloride 0.9%. 1000 milliLiter(s) IV Continuous <Continuous>  dextrose 5%. 1000 milliLiter(s) IV Continuous <Continuous>  dextrose 50% Injectable 12.5 Gram(s) IV Push once  dextrose 50% Injectable 25 Gram(s) IV Push once  dextrose 50% Injectable 25 Gram(s) IV Push once  enoxaparin Injectable 40 milliGRAM(s) SubCutaneous daily  glucagon  Injectable 1 milliGRAM(s) IntraMuscular once PRN  insulin lispro (HumaLOG) corrective regimen sliding scale   SubCutaneous three times a day before meals  insulin lispro Injectable (HumaLOG) 5 Unit(s) SubCutaneous three times a day before meals  losartan 100 milliGRAM(s) Oral every 24 hours  vancomycin  IVPB 750 milliGRAM(s) IV Intermittent every 12 hours                            12.9   10.46 )-----------( 295      ( 31 Aug 2020 04:51 )             39.4       08-31    142  |  104  |  12  ----------------------------<  61<L>  4.0   |  25  |  0.74    Ca    10.0      31 Aug 2020 04:51  Phos  3.2     08-31  Mg     1.9     08-31              T(C): 36.8 (09-01-20 @ 12:55), Max: 36.8 (08-31-20 @ 20:33)  HR: 91 (09-01-20 @ 12:55) (84 - 97)  BP: 163/70 (09-01-20 @ 12:55) (145/65 - 165/80)  RR: 18 (09-01-20 @ 12:55) (18 - 18)  SpO2: 100% (09-01-20 @ 12:55) (98% - 100%)  Wt(kg): --    I&O's Summary    Gen: Appears well in NAD  HEENT:  (-)icterus (-)pallor  CV: N S1 S2 1/6 RAY (+)2 Pulses B/l  Resp:  Clear to ausculatation B/L, normal effort  GI: (+) BS Soft, NT, ND  Lymph:  (-)Edema, (-)obvious lymphadenopathy  Skin: Warm to touch, Normal turgor  Psych: Appropriate mood and affect    ASSESSMENT/PLAN: 	    Patient is a 58 y/o Amharic speaking female known to our office with PMH of HTN, HLD, DM2, PAD s/p 2 stents in left leg (Angioplasty performed with Dr. Baker in [facility] in April 2020) with left 2nd toe ulcer who presents for worsening left 2nd toe pain. Cardiology consulted for preop clearance. Patient was previously seen in our office in 2018 and had a normal exercise NST with no evidence of stress induced ischemia or infarct as well as TTE with normal LV/RV function.    - No evidence of clinical HF or anginal symptoms  - TTE noted above with normal LV/RV function  - Based on patient's RCRI score, would consider her low cardiac risk for any planned procedures. Patient is optimized from CV perspective for vascular procedure.  - No further inpatient cardiac w/u need prior to any vascular/podiatry procedures  - Follow up vascular and podiatry    Melissa Zuniga MD, FACC

## 2020-09-01 NOTE — PROGRESS NOTE ADULT - PROBLEM SELECTOR PLAN 3
Hx of PAD s/p 2 stents   - C/w ASA and plavix   - JONAH/PVR noted, vascular requesting  angiogram records from [facility] with Dr. Eduardo Baker. Case d/w pt , will get HIPPA consent form signed   - pending arterial duplex of LE b/l  - Podiatry planning left foot partial 2nd ray resection , but will need to determine if pt needs vascular surgery intervention Hx of PAD s/p 2 stents   - C/w ASA and plavix   - JONAH/PVR noted, vascular f/u appreciated    - pending arterial duplex of LE b/l, to assess need for   possible angiogram  - Podiatry planning left foot partial 2nd ray resection , ok with vascular to proceed without vas intervention,

## 2020-09-01 NOTE — PROGRESS NOTE ADULT - SUBJECTIVE AND OBJECTIVE BOX
Chief Complaint: DM 2    History: Patient seen at bedside. When offered , patient called her daughter, Juliana. Communicated with assistance from daughter, who reports speaking with herself, or with a  to [facility] is ok with them.  Patient noted with hypoglycemia overnight at 3 AM to 40s - received Lantus 50 units at bedtime on 8/30, Lantus 18 units at bedtime on 8/31  Patient was treated with oral glucose gel with resolution. This AM, glucose 91 mg/dl, patient reports eating breakfast. No Humalog pre-meal administered.  Per primary team, plan for NPO at midnight    MEDICATIONS  (STANDING):  amLODIPine   Tablet 2.5 milliGRAM(s) Oral every 24 hours  aspirin  chewable 81 milliGRAM(s) Oral daily  atorvastatin 40 milliGRAM(s) Oral at bedtime  ceFAZolin   IVPB 1000 milliGRAM(s) IV Intermittent every 8 hours  clopidogrel Tablet 75 milliGRAM(s) Oral daily  collagenase Ointment 1 Application(s) Topical daily  dextrose 5%. 1000 milliLiter(s) (50 mL/Hr) IV Continuous <Continuous>  dextrose 50% Injectable 12.5 Gram(s) IV Push once  dextrose 50% Injectable 25 Gram(s) IV Push once  dextrose 50% Injectable 25 Gram(s) IV Push once  enoxaparin Injectable 40 milliGRAM(s) SubCutaneous daily  insulin lispro (HumaLOG) corrective regimen sliding scale   SubCutaneous three times a day before meals  insulin lispro Injectable (HumaLOG) 5 Unit(s) SubCutaneous three times a day before meals  losartan 100 milliGRAM(s) Oral every 24 hours    MEDICATIONS  (PRN):  acetaminophen   Tablet .. 650 milliGRAM(s) Oral every 6 hours PRN Severe Pain (7 - 10)  dextrose 40% Gel 15 Gram(s) Oral once PRN Blood Glucose LESS THAN 70 milliGRAM(s)/deciliter  dextrose 40% Gel 15 Gram(s) Oral once PRN Blood Glucose LESS THAN 70 milliGRAM(s)/deciliter  glucagon  Injectable 1 milliGRAM(s) IntraMuscular once PRN Glucose LESS THAN 70 milligrams/deciliter    No Known Allergies    Review of Systems:  HEENT: No pain  Cardiovascular: No chest pain  Respiratory: No SOB  GI: No nausea, vomiting    PHYSICAL EXAM:  VITALS: T(C): 36.8 (09-01-20 @ 12:55)  T(F): 98.3 (09-01-20 @ 12:55), Max: 98.3 (09-01-20 @ 12:55)  HR: 91 (09-01-20 @ 12:55) (84 - 97)  BP: 163/70 (09-01-20 @ 12:55) (145/65 - 165/80)  RR:  (18 - 18)  SpO2:  (98% - 100%)  Wt(kg): --  GENERAL: NAD  EYES: No proptosis, no lid lag, anicteric  HEENT:  Atraumatic, Normocephalic, moist mucous membranes  RESPIRATORY: unlabored respirations     CAPILLARY BLOOD GLUCOSE    POCT Blood Glucose.: 328 mg/dL (01 Sep 2020 16:46)  POCT Blood Glucose.: 239 mg/dL (01 Sep 2020 12:07)  POCT Blood Glucose.: 91 mg/dL (01 Sep 2020 08:17)  POCT Blood Glucose.: 94 mg/dL (01 Sep 2020 05:46)  POCT Blood Glucose.: 110 mg/dL (01 Sep 2020 03:50)  POCT Blood Glucose.: 101 mg/dL (01 Sep 2020 03:49)  POCT Blood Glucose.: 39 mg/dL (01 Sep 2020 03:28)  POCT Blood Glucose.: 40 mg/dL (01 Sep 2020 03:28)  POCT Blood Glucose.: 116 mg/dL (31 Aug 2020 22:14)      08-31    142  |  104  |  12  ----------------------------<  61<L>  4.0   |  25  |  0.74    EGFR if : 103  EGFR if non : 89    Ca    10.0      08-31  Mg     1.9     08-31  Phos  3.2     08-31        Thyroid Function Tests:  08-27 @ 15:40 TSH 3.99 FreeT4 -- T3 -- Anti TPO -- Anti Thyroglobulin Ab -- TSI --      A1C with Estimated Average Glucose: 9.6 % (08-28-20 @ 06:38)  A1C with Estimated Average Glucose: 9.5 % (08-27-20 @ 15:16)

## 2020-09-01 NOTE — DIETITIAN INITIAL EVALUATION ADULT. - PERTINENT LABORATORY DATA
08-31 Na 142 mmol/L Glu 61 mg/dL<L> K+ 4.0 mmol/L Cr 0.74 mg/dL BUN 12 mg/dL Phos 3.2 mg/dL  09-01 @ 08:17 POCT 91 mg/dL  09-01 @ 05:46 POCT 94 mg/dL  09-01 @ 03:50 POCT 110 mg/dL  09-01 @ 03:49 POCT 101 mg/dL  09-01 @ 03:28 POCT 39 mg/dL  08-31 @ 22:14 POCT 116 mg/dL  08-31 @ 17:11 POCT 283 mg/dL  08-31 @ 12:03 POCT 200 mg/dL

## 2020-09-01 NOTE — PROGRESS NOTE ADULT - ASSESSMENT
58 YO F with hx of HTN, DMT2, uncontrolled, A1c 9.6. Hx of PAD with 2 stents. Now presenting with dry gangrenous ulcer of 2nd L toe. No associated swelling. Xray of the foot with Possible Osteomyelitis. Wound Cx + S. Aureus skin lois.       Recommendations and Plan:  - Continue Vancomycin  750mg q12h  - Continue Cefepime to 1 mg q24h  - follow up post op cultures  - If clean cultures with no residual gangrenous tissue can DC abx after 60 YO F with hx of HTN, DMT2, uncontrolled, A1c 9.6. Hx of PAD with 2 stents. Now presenting with dry gangrenous ulcer of 2nd L toe. No associated swelling. Xray of the foot with Possible Osteomyelitis. Wound Cx + S. Aureus skin lois.       Recommendations and Plan:  - Continue Vancomycin  750mg q12h  - Continue Cefepime to 1 mg q8h  - follow up post op cultures  - If clean cultures with no residual gangrenous tissue can DC abx after

## 2020-09-01 NOTE — PROGRESS NOTE ADULT - ASSESSMENT
60 y/o F with PMH HTN, HLD, DM2, PAD s/p 2 stents in left leg (Angioplasty performed with Dr. Baker in [facility] in April 2020) with left 2nd toe ulcer who presents for worsening left 2nd toe pain. Foot xray with concerns for osteomyelitis. She now has JONAH/PVR that demonstrate left toe pressure of 55 and JONAH of 0.61. She had angioplasty in April 2020 at [facility] but wound worsened and was more painful starting in June 2020. We are consulted for possible need for LLE revascularization.    - Awaiting arterial duplex before planning possibel angiogram  - Podiatry planning for toe amputation, may proceed from vascular perspective      Vascular Surgery [id number] 58 y/o F with PMH HTN, HLD, DM2, PAD s/p 2 stents in left leg (Angioplasty performed with Dr. Baker in [facility] in April 2020) with left 2nd toe ulcer who presents for worsening left 2nd toe pain. Foot xray with concerns for osteomyelitis. She now has JONAH/PVR that demonstrate left toe pressure of 55 and JONAH of 0.61. She had angioplasty in April 2020 at [facility] but wound worsened and was more painful starting in June 2020. We are consulted for possible need for LLE revascularization.    - Awaiting arterial duplex before planning possible angiogram- vascular lab currently refusing to perform test  - Podiatry planning for toe amputation, may proceed from vascular perspective      Vascular Surgery [id number]

## 2020-09-01 NOTE — PROGRESS NOTE ADULT - SUBJECTIVE AND OBJECTIVE BOX
Patient is a 59y old  Female who presents with a chief complaint of OM (31 Aug 2020 11:16)      SUBJECTIVE / OVERNIGHT EVENTS:    MEDICATIONS  (STANDING):  amLODIPine   Tablet 2.5 milliGRAM(s) Oral every 24 hours  aspirin  chewable 81 milliGRAM(s) Oral daily  atorvastatin 40 milliGRAM(s) Oral at bedtime  cefepime   IVPB 1000 milliGRAM(s) IV Intermittent every 8 hours  clopidogrel Tablet 75 milliGRAM(s) Oral daily  collagenase Ointment 1 Application(s) Topical daily  dextrose 5% + sodium chloride 0.9%. 1000 milliLiter(s) (75 mL/Hr) IV Continuous <Continuous>  dextrose 5%. 1000 milliLiter(s) (50 mL/Hr) IV Continuous <Continuous>  dextrose 50% Injectable 12.5 Gram(s) IV Push once  dextrose 50% Injectable 25 Gram(s) IV Push once  dextrose 50% Injectable 25 Gram(s) IV Push once  enoxaparin Injectable 40 milliGRAM(s) SubCutaneous daily  insulin glargine Injectable (LANTUS) 18 Unit(s) SubCutaneous at bedtime  insulin lispro (HumaLOG) corrective regimen sliding scale   SubCutaneous three times a day before meals  losartan 100 milliGRAM(s) Oral every 24 hours  vancomycin  IVPB 750 milliGRAM(s) IV Intermittent every 12 hours    MEDICATIONS  (PRN):  acetaminophen   Tablet .. 650 milliGRAM(s) Oral every 6 hours PRN Severe Pain (7 - 10)  dextrose 40% Gel 15 Gram(s) Oral once PRN Blood Glucose LESS THAN 70 milliGRAM(s)/deciliter  dextrose 40% Gel 15 Gram(s) Oral once PRN Blood Glucose LESS THAN 70 milliGRAM(s)/deciliter  glucagon  Injectable 1 milliGRAM(s) IntraMuscular once PRN Glucose LESS THAN 70 milligrams/deciliter      Vital Signs Last 24 Hrs  T(C): 36.6 (01 Sep 2020 05:40), Max: 36.8 (31 Aug 2020 20:33)  T(F): 97.9 (01 Sep 2020 05:40), Max: 98.2 (31 Aug 2020 20:33)  HR: 84 (01 Sep 2020 05:40) (84 - 97)  BP: 145/65 (01 Sep 2020 05:40) (145/65 - 166/85)  BP(mean): --  RR: 18 (01 Sep 2020 05:40) (17 - 18)  SpO2: 100% (01 Sep 2020 05:40) (98% - 100%)  CAPILLARY BLOOD GLUCOSE      POCT Blood Glucose.: 91 mg/dL (01 Sep 2020 08:17)  POCT Blood Glucose.: 94 mg/dL (01 Sep 2020 05:46)  POCT Blood Glucose.: 110 mg/dL (01 Sep 2020 03:50)  POCT Blood Glucose.: 101 mg/dL (01 Sep 2020 03:49)  POCT Blood Glucose.: 39 mg/dL (01 Sep 2020 03:28)  POCT Blood Glucose.: 40 mg/dL (01 Sep 2020 03:28)  POCT Blood Glucose.: 116 mg/dL (31 Aug 2020 22:14)  POCT Blood Glucose.: 283 mg/dL (31 Aug 2020 17:11)  POCT Blood Glucose.: 200 mg/dL (31 Aug 2020 12:03)    I&O's Summary      PHYSICAL EXAM:  GENERAL: NAD, well-developed  HEAD:  Atraumatic, Normocephalic  EYES: EOMI, PERRLA, conjunctiva and sclera clear  NECK: Supple, No JVD  CHEST/LUNG: Clear to auscultation bilaterally; No wheeze  HEART: Regular rate and rhythm; No murmurs, rubs, or gallops  ABDOMEN: Soft, Nontender, Nondistended; Bowel sounds present  EXTREMITIES:  2+ Peripheral Pulses, No clubbing, cyanosis, or edema  PSYCH: AAOx3  NEUROLOGY: non-focal  SKIN: No rashes or lesions    LABS:                        12.9   10.46 )-----------( 295      ( 31 Aug 2020 04:51 )             39.4     08-31    142  |  104  |  12  ----------------------------<  61<L>  4.0   |  25  |  0.74    Ca    10.0      31 Aug 2020 04:51  Phos  3.2     08-31  Mg     1.9     08-31      PT/INR - ( 31 Aug 2020 04:51 )   PT: 12.1 SEC;   INR: 1.05          PTT - ( 31 Aug 2020 04:51 )  PTT:28.5 SEC          RADIOLOGY & ADDITIONAL TESTS:    Imaging Personally Reviewed:    Consultant(s) Notes Reviewed:      Care Discussed with Consultants/Other Providers: Patient is a 59y old  Female who presents with a chief complaint of OM (31 Aug 2020 11:16)      SUBJECTIVE / OVERNIGHT EVENTS: patient seen and examined by bedside, pain in foot controlled, denies headache, dizziness, SOB, CP, Palpitations , N/V/D, abdominal pain  pt still having episodes of hypoglycemia       MEDICATIONS  (STANDING):  amLODIPine   Tablet 2.5 milliGRAM(s) Oral every 24 hours  aspirin  chewable 81 milliGRAM(s) Oral daily  atorvastatin 40 milliGRAM(s) Oral at bedtime  cefepime   IVPB 1000 milliGRAM(s) IV Intermittent every 8 hours  clopidogrel Tablet 75 milliGRAM(s) Oral daily  collagenase Ointment 1 Application(s) Topical daily  dextrose 5% + sodium chloride 0.9%. 1000 milliLiter(s) (75 mL/Hr) IV Continuous <Continuous>  dextrose 5%. 1000 milliLiter(s) (50 mL/Hr) IV Continuous <Continuous>  dextrose 50% Injectable 12.5 Gram(s) IV Push once  dextrose 50% Injectable 25 Gram(s) IV Push once  dextrose 50% Injectable 25 Gram(s) IV Push once  enoxaparin Injectable 40 milliGRAM(s) SubCutaneous daily  insulin glargine Injectable (LANTUS) 18 Unit(s) SubCutaneous at bedtime  insulin lispro (HumaLOG) corrective regimen sliding scale   SubCutaneous three times a day before meals  losartan 100 milliGRAM(s) Oral every 24 hours  vancomycin  IVPB 750 milliGRAM(s) IV Intermittent every 12 hours    MEDICATIONS  (PRN):  acetaminophen   Tablet .. 650 milliGRAM(s) Oral every 6 hours PRN Severe Pain (7 - 10)  dextrose 40% Gel 15 Gram(s) Oral once PRN Blood Glucose LESS THAN 70 milliGRAM(s)/deciliter  dextrose 40% Gel 15 Gram(s) Oral once PRN Blood Glucose LESS THAN 70 milliGRAM(s)/deciliter  glucagon  Injectable 1 milliGRAM(s) IntraMuscular once PRN Glucose LESS THAN 70 milligrams/deciliter      Vital Signs Last 24 Hrs  T(C): 36.6 (01 Sep 2020 05:40), Max: 36.8 (31 Aug 2020 20:33)  T(F): 97.9 (01 Sep 2020 05:40), Max: 98.2 (31 Aug 2020 20:33)  HR: 84 (01 Sep 2020 05:40) (84 - 97)  BP: 145/65 (01 Sep 2020 05:40) (145/65 - 166/85)  BP(mean): --  RR: 18 (01 Sep 2020 05:40) (17 - 18)  SpO2: 100% (01 Sep 2020 05:40) (98% - 100%)  CAPILLARY BLOOD GLUCOSE      POCT Blood Glucose.: 91 mg/dL (01 Sep 2020 08:17)  POCT Blood Glucose.: 94 mg/dL (01 Sep 2020 05:46)  POCT Blood Glucose.: 110 mg/dL (01 Sep 2020 03:50)  POCT Blood Glucose.: 101 mg/dL (01 Sep 2020 03:49)  POCT Blood Glucose.: 39 mg/dL (01 Sep 2020 03:28)  POCT Blood Glucose.: 40 mg/dL (01 Sep 2020 03:28)  POCT Blood Glucose.: 116 mg/dL (31 Aug 2020 22:14)  POCT Blood Glucose.: 283 mg/dL (31 Aug 2020 17:11)  POCT Blood Glucose.: 200 mg/dL (31 Aug 2020 12:03)    I&O's Summary    PHYSICAL EXAM  GENERAL: NAD, overweight, non-toxic appearing  CHEST/LUNG: Clear to auscultation bilaterally; No wheeze  HEART: Regular rate and rhythm  ABDOMEN: Soft, Nontender, Nondistended; Bowel sounds present  EXTREMITIES:  2+ Peripheral Pulses, No clubbing, cyanosis, or edema. Left wrapped in dressing, c/d/i. Planter surface of left  to touch around 2nd toe.  PSYCH: AAOx3, cooperative, calm           LABS:                        12.9   10.46 )-----------( 295      ( 31 Aug 2020 04:51 )             39.4     08-31    142  |  104  |  12  ----------------------------<  61<L>  4.0   |  25  |  0.74    Ca    10.0      31 Aug 2020 04:51  Phos  3.2     08-31  Mg     1.9     08-31      PT/INR - ( 31 Aug 2020 04:51 )   PT: 12.1 SEC;   INR: 1.05          PTT - ( 31 Aug 2020 04:51 )  PTT:28.5 SEC          RADIOLOGY & ADDITIONAL TESTS:    Imaging Personally Reviewed:    Consultant(s) Notes Reviewed:  podiatry, vascular , cardiology     Care Discussed with Consultants/Other Providers:

## 2020-09-01 NOTE — PROGRESS NOTE ADULT - PROBLEM SELECTOR PLAN 4
Hx of DM. A1C 9.6, indicating poor glycemic control.  Noted hypoglycemic event during overnight shift -   Pt NOT taking Lantus 100U Qhs and metformin 1000mg BID at home at home. Pt taking only Lantus 34-35 units at night and metformin in the morning as confirmed by pt and her daughter. Pt was also prescribed Novolog 14 units TID but not taking.    -pt was getting lantus 50 U BID, likely cause of hypoglycemia ,   -endo eval appreciated , rcommend D5 at 30 ml/hr and to discontinue if FS persistently >200, Lantus 18 units qhs and Humalog 6 units AC staring 9  - Hold metformin while inpatient  I  - change from moderate corrective insulin sliding scale to low corrective insulin sliding scale  - diabetes education Hx of DM. A1C 9.6, indicating poor glycemic control.  Noted hypoglycemic event during overnight shift -   Pt NOT taking Lantus 100U Qhs and metformin 1000mg BID at home at home. Pt taking only Lantus 34-35 units at night and metformin in the morning as confirmed by pt and her daughter. Pt was also prescribed Novolog 14 units TID but not taking.    -pt was getting lantus 50 U BID, likely cause of hypoglycemia ,   -endo eval appreciated , recommend D5 at 30 ml/hr and to discontinue if FS persistently >200,   pt got Lantus 18 units qhs and  was hypoglycemic again ,lantus dced, will f/u endo recs   - Hold metformin while inpatient  I  - change from moderate corrective insulin sliding scale to low corrective insulin sliding scale  - diabetes education

## 2020-09-01 NOTE — PROGRESS NOTE ADULT - SUBJECTIVE AND OBJECTIVE BOX
VASCULAR SURGERY PROGRESS NOTE    INTERVAL EVENTS: Toe pain improved. Outpatient angio records obtained       OBJECTIVE:    Vital Signs Last 24 Hrs  T(C): 36.8 (01 Sep 2020 12:55), Max: 36.8 (31 Aug 2020 20:33)  T(F): 98.3 (01 Sep 2020 12:55), Max: 98.3 (01 Sep 2020 12:55)  HR: 91 (01 Sep 2020 12:55) (84 - 97)  BP: 163/70 (01 Sep 2020 12:55) (145/65 - 165/80)  BP(mean): --  RR: 18 (01 Sep 2020 12:55) (18 - 18)  SpO2: 100% (01 Sep 2020 12:55) (98% - 100%)    Gen: NAD, resting in bed  Resp: Airway patent, non-labored respirations  Abd: Soft, ND, NT  Ext: WWP  Pulses: Palpable femoral pulses bilaterally, Palpable right DP pulse and dopplerable PT, PT/DP dopplerable signal on left, toe very tender to palpation     I&O's Summary    I&O's Detail        LABS:                        12.9   10.46 )-----------( 295      ( 31 Aug 2020 04:51 )             39.4     08-31    142  |  104  |  12  ----------------------------<  61<L>  4.0   |  25  |  0.74    Ca    10.0      31 Aug 2020 04:51  Phos  3.2     08-31  Mg     1.9     08-31      PT/INR - ( 31 Aug 2020 04:51 )   PT: 12.1 SEC;   INR: 1.05          PTT - ( 31 Aug 2020 04:51 )  PTT:28.5 SEC      RADIOLOGY & ADDITIONAL STUDIES:

## 2020-09-01 NOTE — PRE-OP CHECKLIST - SELECT TESTS ORDERED
INR/PT/PTT/POCT Blood Glucose/BMP/CBC/Type and Cross 131/Type and Cross/POCT Blood Glucose/PT/PTT/INR/BMP/CBC

## 2020-09-01 NOTE — DIETITIAN INITIAL EVALUATION ADULT. - PERTINENT MEDS FT
MEDICATIONS  (STANDING):  amLODIPine   Tablet 2.5 milliGRAM(s) Oral every 24 hours  aspirin  chewable 81 milliGRAM(s) Oral daily  atorvastatin 40 milliGRAM(s) Oral at bedtime  cefepime   IVPB 1000 milliGRAM(s) IV Intermittent every 8 hours  clopidogrel Tablet 75 milliGRAM(s) Oral daily  collagenase Ointment 1 Application(s) Topical daily  dextrose 5% + sodium chloride 0.9%. 1000 milliLiter(s) (75 mL/Hr) IV Continuous <Continuous>  dextrose 5%. 1000 milliLiter(s) (50 mL/Hr) IV Continuous <Continuous>  dextrose 50% Injectable 12.5 Gram(s) IV Push once  dextrose 50% Injectable 25 Gram(s) IV Push once  dextrose 50% Injectable 25 Gram(s) IV Push once  enoxaparin Injectable 40 milliGRAM(s) SubCutaneous daily  insulin lispro (HumaLOG) corrective regimen sliding scale   SubCutaneous three times a day before meals  losartan 100 milliGRAM(s) Oral every 24 hours  vancomycin  IVPB 750 milliGRAM(s) IV Intermittent every 12 hours

## 2020-09-01 NOTE — PROGRESS NOTE ADULT - ASSESSMENT
59 yr old F with Type 2 DM uncontrolled A1C 9.6% c/b neuropathy, PAD s/p PCI here with L 2nd OM.   8/30 - patient received Lantus 50 units at bedtime, hypoglycemia in AM 8/31, endocrine consulted  8/31 - patient received Lantus 18 units at bedtime, hypoglycemia at 3 AM 9/1    1. Type 2 diabetes mellitus with hyperglycemia, with long-term current use of insulin  -HbA1c 9.6%, goal less than 7%  -Inpatient BG target 100-180 mg/dl, hypoglycemia this AM, then with hyperglycemia due to no pre-meal Humalog while eating  -Noted that although patient home dose of Lantus is 36 units, she had hypoglycemia even with half of that dose (18 units)  -Patient will be NPO after midnight  -Recommend Lantus 9 units SQ qHS for tonight (for NPO status)  -While NPO, can do Humalog low dose scale q6h and FS q6h    Once patient returned to floor and tolerating diet:  -Recommend Humalog 5 units SQ TID before meals (Hold if NPO/not eating meal)  -Consistent carbohydrate diet   -Humalog LOW dose scales before meals and bedtime  -FS AC and HS    Discharge Plan:  -Resume basal/oral vs basal/bolus TBD   -Please send scripts for glucometer, test strips, lancets, alcohol swabs, insulin pens and pen needles to patient's pharmacy prior to discharge.  -She can follow up at [facility] [place], [address] [facility], [phone]     2. Hypertension, unspecified type  -BP goal <130/80.  -Mgmt per primary team     3. Hyperlipidemia, unspecified hyperlipidemia type  -LDL target less than 70  -Continue Atorvastatin 40 mg daily if no contraindications     Reviewed with primary team ACP  Melanie De Oliveira  Nurse Practitioner  Division of Endocrinology & Diabetes  In house pager #[phone]/long range pager #[phone]    If before 9AM or after 6PM, or on weekends/holidays, please call endocrine answering service for assistance ([phone]).  For nonurgent matters email [email or web address] for assistance.

## 2020-09-01 NOTE — PROGRESS NOTE ADULT - SUBJECTIVE AND OBJECTIVE BOX
Follow Up:      [place] History/ROS:Patient is a 59y old  Female who presents with a chief complaint of OM (31 Aug 2020 11:16)  Patient denies SOB, CP, fever, Chills. Continues to have pain at tip of 2nd toe. Plan for 2nd toe amputation tomorrow 9/2      Allergies    No Known Allergies    Intolerances        ANTIMICROBIALS:  cefepime   IVPB 1000 every 8 hours  vancomycin  IVPB 750 every 12 hours      OTHER MEDS:  acetaminophen   Tablet .. 650 milliGRAM(s) Oral every 6 hours PRN  amLODIPine   Tablet 2.5 milliGRAM(s) Oral every 24 hours  aspirin  chewable 81 milliGRAM(s) Oral daily  atorvastatin 40 milliGRAM(s) Oral at bedtime  clopidogrel Tablet 75 milliGRAM(s) Oral daily  collagenase Ointment 1 Application(s) Topical daily  dextrose 40% Gel 15 Gram(s) Oral once PRN  dextrose 40% Gel 15 Gram(s) Oral once PRN  dextrose 5% + sodium chloride 0.9%. 1000 milliLiter(s) IV Continuous <Continuous>  dextrose 5%. 1000 milliLiter(s) IV Continuous <Continuous>  dextrose 50% Injectable 12.5 Gram(s) IV Push once  dextrose 50% Injectable 25 Gram(s) IV Push once  dextrose 50% Injectable 25 Gram(s) IV Push once  enoxaparin Injectable 40 milliGRAM(s) SubCutaneous daily  glucagon  Injectable 1 milliGRAM(s) IntraMuscular once PRN  insulin lispro (HumaLOG) corrective regimen sliding scale   SubCutaneous three times a day before meals  insulin lispro Injectable (HumaLOG) 5 Unit(s) SubCutaneous three times a day before meals  losartan 100 milliGRAM(s) Oral every 24 hours      Vital Signs Last 24 Hrs  T(C): 36.6 (01 Sep 2020 05:40), Max: 36.8 (31 Aug 2020 20:33)  T(F): 97.9 (01 Sep 2020 05:40), Max: 98.2 (31 Aug 2020 20:33)  HR: 84 (01 Sep 2020 05:40) (84 - 97)  BP: 145/65 (01 Sep 2020 05:40) (145/65 - 165/80)  BP(mean): --  RR: 18 (01 Sep 2020 05:40) (18 - 18)  SpO2: 100% (01 Sep 2020 05:40) (98% - 100%)    Physical Exam  Gen: Well appearing, NAD  Lungs: CTA B/L, no w/r/r  Heart: RRR, no m/r/g  Abdomen: soft, non-ttp  Ext: no pitting edema, Left foot with decreased pulses, necrotic ulcer at tip of Left 2nd toe  Skin: no rashes                          12.9   10.46 )-----------( 295      ( 31 Aug 2020 04:51 )             39.4       08-31    142  |  104  |  12  ----------------------------<  61<L>  4.0   |  25  |  0.74    Ca    10.0      31 Aug 2020 04:51  Phos  3.2     08-31  Mg     1.9     08-31            MICROBIOLOGY:Culture Results:   Numerous Staphylococcus aureus  Normal skin lois isolated (08-27 @ 22:28)      RADIOLOGY:    xray of left foot with OM.

## 2020-09-01 NOTE — PROGRESS NOTE ADULT - PROBLEM SELECTOR PLAN 1
Ongoing L 2nd toe foot ulcer/gangrene. Afebrile. No leukocytosis. CRP negative. ESR 34. Xray with 2nd toe soft tissue swelling with focal ulceration at tip. In addition, eroded and indistinct underlying 2nd distal phalangeal tuft cortical margins consistent with radiographic changes of osteomyelitis. No tracking gas collections beyond this region and no additional focal areas of osteomyelitis.   Podiatry following the case  -pending left foot partial 2nd ray resection. Will f/u regarding date of procedure with podiatry  - C/w vanc/cefepime for now, ID eval requested Ongoing L 2nd toe foot ulcer/gangrene. Afebrile. No leukocytosis. CRP negative. ESR 34. Xray with 2nd toe soft tissue swelling with focal ulceration at tip. In addition, eroded and indistinct underlying 2nd distal phalangeal tuft cortical margins consistent with radiographic changes of osteomyelitis. No tracking gas collections beyond this region and no additional focal areas of osteomyelitis.   Podiatry following the case  -pending left foot partial 2nd ray resection. podiatry plan for sx in am, NPO  after midnight tonight   - C/w vanc/cefepime for now, ID eval  appreciated

## 2020-09-01 NOTE — PRE-OP CHECKLIST - 3.
left foot dressing with kerlex dry and intact Left UE/Right UE/Left LE/Right LE/Head/Neck/Trunk/Grossly Intact

## 2020-09-01 NOTE — PROGRESS NOTE ADULT - PROBLEM SELECTOR PLAN 6
Hx of HLD  - d/c simvastatin 40mg qd --> recommend transitioning to high intensity statin, pt was on atorvastatin 40mg daily, will resume Hx of HLD  - d/c simvastatin 40mg qd --> recommend transitioning to high intensity statin, pt was on atorvastatin 40mg daily,  resumed

## 2020-09-01 NOTE — PROGRESS NOTE ADULT - ASSESSMENT
-pt was seen and evaluated   -LF second digit fibronecrotic wound to bone, w/ undermining, no purulence. No periwound erythema, no malodor, etiology likely ischemic. Wound is tender to touch.   -Xray: OM to distal phallanx of left second digit  -Discussed surgical intervention w/ patient for left foot 2nd digit amputation, pt agreeable to plan   - angio at [facility]: julio atherectomy of PTA, balloon angioplasty of L PT and L common illiac artery w/ drug coated balloon angioplasty of L SFA  -booked for L foot partial 2nd ray resection at 9/2 1030 AM w/ Dr. Stauffer  - per vasc, pt good to go for amp and does not need further revasc  -seen w/ attending

## 2020-09-01 NOTE — DIETITIAN INITIAL EVALUATION ADULT. - OTHER INFO
60 y/o F with PMH HTN, HLD, DM2, PAD, p/w worsening L second toe pain with OM. Pt is Vietnamese speaking; she preferred to have daughter translate over phone. Pt with good PO intake and appetite PTA. Denies any GI issues (nausea/vomiting/diarrhea/constipation.) Denies any chewing or swallowing difficulties at this time. Denies any wt changes with UBW ~150 pounds.  Per daughter, pt usually eats rice, fish, some snacks (nonspecific).     Provided pt with handout Type 2 Diabetes Nutrition Therapy. Discussed carbohydrate sources, carbohydrate portions, protein sources, mixed meals, and nutrition label reading. Informed pt of current A1c, goal A1c, and goal fingerstick range.

## 2020-09-01 NOTE — PROGRESS NOTE ADULT - SUBJECTIVE AND OBJECTIVE BOX
See Dr. Pernell Cornelius in 1 year. Podiatry pager #: [phone] ([place])/ [phone] ([facility])    Patient is a 59y old  Female who presents with a chief complaint of OM (31 Aug 2020 11:16)       INTERVAL HPI/OVERNIGHT EVENTS:  Patient seen and evaluated at bedside.  Pt is resting comfortable in NAD. Denies N/V/F/C.     Allergies    No Known Allergies    Intolerances        Vital Signs Last 24 Hrs  T(C): 36.6 (01 Sep 2020 05:40), Max: 36.8 (31 Aug 2020 20:33)  T(F): 97.9 (01 Sep 2020 05:40), Max: 98.2 (31 Aug 2020 20:33)  HR: 84 (01 Sep 2020 05:40) (84 - 97)  BP: 145/65 (01 Sep 2020 05:40) (145/65 - 166/85)  BP(mean): --  RR: 18 (01 Sep 2020 05:40) (17 - 18)  SpO2: 100% (01 Sep 2020 05:40) (98% - 100%)    LABS:                        12.9   10.46 )-----------( 295      ( 31 Aug 2020 04:51 )             39.4     08-31    142  |  104  |  12  ----------------------------<  61<L>  4.0   |  25  |  0.74    Ca    10.0      31 Aug 2020 04:51  Phos  3.2     08-31  Mg     1.9     08-31      PT/INR - ( 31 Aug 2020 04:51 )   PT: 12.1 SEC;   INR: 1.05          PTT - ( 31 Aug 2020 04:51 )  PTT:28.5 SEC    CAPILLARY BLOOD GLUCOSE      POCT Blood Glucose.: 91 mg/dL (01 Sep 2020 08:17)  POCT Blood Glucose.: 94 mg/dL (01 Sep 2020 05:46)  POCT Blood Glucose.: 110 mg/dL (01 Sep 2020 03:50)  POCT Blood Glucose.: 101 mg/dL (01 Sep 2020 03:49)  POCT Blood Glucose.: 39 mg/dL (01 Sep 2020 03:28)  POCT Blood Glucose.: 40 mg/dL (01 Sep 2020 03:28)  POCT Blood Glucose.: 116 mg/dL (31 Aug 2020 22:14)  POCT Blood Glucose.: 283 mg/dL (31 Aug 2020 17:11)  POCT Blood Glucose.: 200 mg/dL (31 Aug 2020 12:03)      Lower Extremity Physical Exam:    Vascular: DP 1/4, PT NP, B/L,  Neuro: Epicritic sensation intact to the level of the digits b/l   MSk: unremarkable  LF second digit fibronecrotic wound to bone, w/ undermining, scant purulence noted. No periwound erythema, no malodor, etiology likely ischemic. Wound is tender to touch.   RADIOLOGY & ADDITIONAL TESTS:

## 2020-09-01 NOTE — DIETITIAN INITIAL EVALUATION ADULT. - ADD RECOMMEND
1. F/u with diabetes education as needed. 2. Pt will benefit from outpatient f/u with CDE for further management of blood sugars.

## 2020-09-02 ENCOUNTER — RESULT REVIEW (OUTPATIENT)
Age: 60
End: 2020-09-02

## 2020-09-02 LAB
ANION GAP SERPL CALC-SCNC: 11 MMO/L — SIGNIFICANT CHANGE UP (ref 7–14)
APTT BLD: 28.1 SEC — SIGNIFICANT CHANGE UP (ref 27–36.3)
BLD GP AB SCN SERPL QL: NEGATIVE — SIGNIFICANT CHANGE UP
BUN SERPL-MCNC: 12 MG/DL — SIGNIFICANT CHANGE UP (ref 7–23)
CALCIUM SERPL-MCNC: 9.5 MG/DL — SIGNIFICANT CHANGE UP (ref 8.4–10.5)
CHLORIDE SERPL-SCNC: 106 MMOL/L — SIGNIFICANT CHANGE UP (ref 98–107)
CO2 SERPL-SCNC: 26 MMOL/L — SIGNIFICANT CHANGE UP (ref 22–31)
CREAT SERPL-MCNC: 0.64 MG/DL — SIGNIFICANT CHANGE UP (ref 0.5–1.3)
GLUCOSE BLDC GLUCOMTR-MCNC: 124 MG/DL — HIGH (ref 70–99)
GLUCOSE BLDC GLUCOMTR-MCNC: 131 MG/DL — HIGH (ref 70–99)
GLUCOSE BLDC GLUCOMTR-MCNC: 156 MG/DL — HIGH (ref 70–99)
GLUCOSE BLDC GLUCOMTR-MCNC: 315 MG/DL — HIGH (ref 70–99)
GLUCOSE BLDC GLUCOMTR-MCNC: 87 MG/DL — SIGNIFICANT CHANGE UP (ref 70–99)
GLUCOSE SERPL-MCNC: 113 MG/DL — HIGH (ref 70–99)
GRAM STN FLD: SIGNIFICANT CHANGE UP
HCT VFR BLD CALC: 35.6 % — SIGNIFICANT CHANGE UP (ref 34.5–45)
HGB BLD-MCNC: 11.9 G/DL — SIGNIFICANT CHANGE UP (ref 11.5–15.5)
INR BLD: 1.06 — SIGNIFICANT CHANGE UP (ref 0.88–1.16)
MCHC RBC-ENTMCNC: 28.2 PG — SIGNIFICANT CHANGE UP (ref 27–34)
MCHC RBC-ENTMCNC: 33.4 % — SIGNIFICANT CHANGE UP (ref 32–36)
MCV RBC AUTO: 84.4 FL — SIGNIFICANT CHANGE UP (ref 80–100)
NRBC # FLD: 0 K/UL — SIGNIFICANT CHANGE UP (ref 0–0)
PLATELET # BLD AUTO: 268 K/UL — SIGNIFICANT CHANGE UP (ref 150–400)
PMV BLD: 9.9 FL — SIGNIFICANT CHANGE UP (ref 7–13)
POTASSIUM SERPL-MCNC: 3.9 MMOL/L — SIGNIFICANT CHANGE UP (ref 3.5–5.3)
POTASSIUM SERPL-SCNC: 3.9 MMOL/L — SIGNIFICANT CHANGE UP (ref 3.5–5.3)
PROTHROM AB SERPL-ACNC: 12 SEC — SIGNIFICANT CHANGE UP (ref 10.6–13.6)
RBC # BLD: 4.22 M/UL — SIGNIFICANT CHANGE UP (ref 3.8–5.2)
RBC # FLD: 13.2 % — SIGNIFICANT CHANGE UP (ref 10.3–14.5)
RH IG SCN BLD-IMP: POSITIVE — SIGNIFICANT CHANGE UP
SODIUM SERPL-SCNC: 143 MMOL/L — SIGNIFICANT CHANGE UP (ref 135–145)
SPECIMEN SOURCE: SIGNIFICANT CHANGE UP
WBC # BLD: 7.6 K/UL — SIGNIFICANT CHANGE UP (ref 3.8–10.5)
WBC # FLD AUTO: 7.6 K/UL — SIGNIFICANT CHANGE UP (ref 3.8–10.5)

## 2020-09-02 PROCEDURE — 88305 TISSUE EXAM BY PATHOLOGIST: CPT | Mod: 26

## 2020-09-02 PROCEDURE — 99232 SBSQ HOSP IP/OBS MODERATE 35: CPT

## 2020-09-02 PROCEDURE — 93925 LOWER EXTREMITY STUDY: CPT | Mod: 26

## 2020-09-02 PROCEDURE — 73630 X-RAY EXAM OF FOOT: CPT | Mod: 26,LT

## 2020-09-02 PROCEDURE — 88311 DECALCIFY TISSUE: CPT | Mod: 26

## 2020-09-02 PROCEDURE — 99233 SBSQ HOSP IP/OBS HIGH 50: CPT

## 2020-09-02 RX ORDER — ACETAMINOPHEN 500 MG
650 TABLET ORAL EVERY 6 HOURS
Refills: 0 | Status: DISCONTINUED | OUTPATIENT
Start: 2020-09-02 | End: 2020-09-04

## 2020-09-02 RX ORDER — HYDROMORPHONE HYDROCHLORIDE 2 MG/ML
0.5 INJECTION INTRAMUSCULAR; INTRAVENOUS; SUBCUTANEOUS
Refills: 0 | Status: DISCONTINUED | OUTPATIENT
Start: 2020-09-02 | End: 2020-09-02

## 2020-09-02 RX ORDER — LANOLIN ALCOHOL/MO/W.PET/CERES
3 CREAM (GRAM) TOPICAL AT BEDTIME
Refills: 0 | Status: DISCONTINUED | OUTPATIENT
Start: 2020-09-02 | End: 2020-09-04

## 2020-09-02 RX ORDER — OXYCODONE AND ACETAMINOPHEN 5; 325 MG/1; MG/1
1 TABLET ORAL EVERY 4 HOURS
Refills: 0 | Status: DISCONTINUED | OUTPATIENT
Start: 2020-09-02 | End: 2020-09-04

## 2020-09-02 RX ORDER — SODIUM CHLORIDE 9 MG/ML
1000 INJECTION, SOLUTION INTRAVENOUS
Refills: 0 | Status: DISCONTINUED | OUTPATIENT
Start: 2020-09-02 | End: 2020-09-02

## 2020-09-02 RX ORDER — AMLODIPINE BESYLATE 2.5 MG/1
5 TABLET ORAL DAILY
Refills: 0 | Status: DISCONTINUED | OUTPATIENT
Start: 2020-09-02 | End: 2020-09-04

## 2020-09-02 RX ORDER — ONDANSETRON 8 MG/1
4 TABLET, FILM COATED ORAL ONCE
Refills: 0 | Status: DISCONTINUED | OUTPATIENT
Start: 2020-09-02 | End: 2020-09-02

## 2020-09-02 RX ADMIN — Medication 100 MILLIGRAM(S): at 22:26

## 2020-09-02 RX ADMIN — Medication 1: at 17:34

## 2020-09-02 RX ADMIN — Medication 100 MILLIGRAM(S): at 15:04

## 2020-09-02 RX ADMIN — LOSARTAN POTASSIUM 100 MILLIGRAM(S): 100 TABLET, FILM COATED ORAL at 20:29

## 2020-09-02 RX ADMIN — AMLODIPINE BESYLATE 5 MILLIGRAM(S): 2.5 TABLET ORAL at 17:37

## 2020-09-02 RX ADMIN — Medication 100 MILLIGRAM(S): at 06:37

## 2020-09-02 RX ADMIN — Medication 3 MILLIGRAM(S): at 22:31

## 2020-09-02 RX ADMIN — Medication 5 UNIT(S): at 17:34

## 2020-09-02 RX ADMIN — INSULIN GLARGINE 9 UNIT(S): 100 INJECTION, SOLUTION SUBCUTANEOUS at 22:52

## 2020-09-02 RX ADMIN — ATORVASTATIN CALCIUM 40 MILLIGRAM(S): 80 TABLET, FILM COATED ORAL at 22:26

## 2020-09-02 NOTE — PROGRESS NOTE ADULT - SUBJECTIVE AND OBJECTIVE BOX
Patient is a 59y old  Female who presents with a chief complaint of Left 2nd toe gangrenous necrotic ulcer (01 Sep 2020 12:47)      INTERVAL HPI/OVERNIGHT EVENTS:   Pt is scheduled for left foot partial 2nd ray resection with Dr. Stauffer at 10:30 am. Patient is aware of procedure and is NPO since midnight.    MEDICATIONS  (STANDING):  amLODIPine   Tablet 2.5 milliGRAM(s) Oral every 24 hours  aspirin  chewable 81 milliGRAM(s) Oral daily  atorvastatin 40 milliGRAM(s) Oral at bedtime  ceFAZolin   IVPB 1000 milliGRAM(s) IV Intermittent every 8 hours  clopidogrel Tablet 75 milliGRAM(s) Oral daily  collagenase Ointment 1 Application(s) Topical daily  dextrose 5%. 1000 milliLiter(s) (50 mL/Hr) IV Continuous <Continuous>  dextrose 50% Injectable 12.5 Gram(s) IV Push once  dextrose 50% Injectable 25 Gram(s) IV Push once  dextrose 50% Injectable 25 Gram(s) IV Push once  enoxaparin Injectable 40 milliGRAM(s) SubCutaneous daily  insulin glargine Injectable (LANTUS) 9 Unit(s) SubCutaneous at bedtime  insulin lispro (HumaLOG) corrective regimen sliding scale   SubCutaneous three times a day before meals  insulin lispro Injectable (HumaLOG) 5 Unit(s) SubCutaneous three times a day before meals  losartan 100 milliGRAM(s) Oral every 24 hours    MEDICATIONS  (PRN):  acetaminophen   Tablet .. 650 milliGRAM(s) Oral every 6 hours PRN Severe Pain (7 - 10)  dextrose 40% Gel 15 Gram(s) Oral once PRN Blood Glucose LESS THAN 70 milliGRAM(s)/deciliter  dextrose 40% Gel 15 Gram(s) Oral once PRN Blood Glucose LESS THAN 70 milliGRAM(s)/deciliter  glucagon  Injectable 1 milliGRAM(s) IntraMuscular once PRN Glucose LESS THAN 70 milligrams/deciliter      Allergies    No Known Allergies    Intolerances        Vital Signs Last 24 Hrs  T(C): 36.8 (01 Sep 2020 20:38), Max: 36.8 (01 Sep 2020 12:55)  T(F): 98.2 (01 Sep 2020 20:38), Max: 98.3 (01 Sep 2020 12:55)  HR: 96 (02 Sep 2020 00:35) (88 - 98)  BP: 159/88 (02 Sep 2020 00:35) (156/83 - 173/94)  BP(mean): --  RR: 18 (01 Sep 2020 20:38) (18 - 18)  SpO2: 100% (01 Sep 2020 20:38) (100% - 100%)    LABS:                        11.9   7.60  )-----------( 268      ( 02 Sep 2020 03:00 )             35.6     09-02    143  |  106  |  12  ----------------------------<  113<H>  3.9   |  26  |  0.64    Ca    9.5      02 Sep 2020 03:00      PT/INR - ( 02 Sep 2020 03:00 )   PT: 12.0 SEC;   INR: 1.06          PTT - ( 02 Sep 2020 03:00 )  PTT:28.1 SEC    CAPILLARY BLOOD GLUCOSE      POCT Blood Glucose.: 124 mg/dL (02 Sep 2020 02:46)  POCT Blood Glucose.: 177 mg/dL (01 Sep 2020 21:37)  POCT Blood Glucose.: 328 mg/dL (01 Sep 2020 16:46)  POCT Blood Glucose.: 239 mg/dL (01 Sep 2020 12:07)  POCT Blood Glucose.: 91 mg/dL (01 Sep 2020 08:17)      RADIOLOGY & ADDITIONAL TESTS:    Plan:   To OR today at 10:30 am with Dr. Stauffer  for Left foot partial 2nd ray resection.   CXR on sunrise.  EKG on sunrise.  Medical/Cardiac clearance since 8/28 and documented in chart.  Consent signed and in chart.  Procedure was explained to patient in detail. All alternatives, risks and complications were discussed. All questions answered.

## 2020-09-02 NOTE — PROGRESS NOTE ADULT - PROBLEM SELECTOR PLAN 1
Ongoing L 2nd toe foot ulcer/gangrene. Afebrile. No leukocytosis. CRP negative. ESR 34. Xray with 2nd toe soft tissue swelling with focal ulceration at tip. In addition, eroded and indistinct underlying 2nd distal phalangeal tuft cortical margins consistent with radiographic changes of osteomyelitis. No tracking gas collections beyond this region and no additional focal areas of osteomyelitis.   Podiatry following the case  -pending left foot partial 2nd ray resection. podiatry plan for sx today   -  was on  vanc/cefepime for now, ID f/u appreciated, Abx changed to Ancef

## 2020-09-02 NOTE — PROGRESS NOTE ADULT - ASSESSMENT
60 y/o F with PMH HTN, HLD, DM2, PAD s/p 2 stents in left leg (Angioplasty performed with Dr. Baker in [facility] in April 2020) with left 2nd toe ulcer who presents for worsening left 2nd toe pain. Foot xray with concerns for osteomyelitis. She now has JONAH/PVR that demonstrate left toe pressure of 55 and JONAH of 0.61. She had angioplasty in April 2020 at [facility] but wound worsened and was more painful starting in June 2020. We are consulted for possible need for LLE revascularization.    - Podiatry planning for toe amputation today at 1030  - Will follow up for adequate bleeding post operatively         Vascular Surgery [id number]

## 2020-09-02 NOTE — PROGRESS NOTE ADULT - PROBLEM SELECTOR PLAN 3
Hx of PAD s/p 2 stents   - C/w ASA and plavix   - JONAH/PVR noted, vascular f/u appreciated    - pending arterial duplex of LE b/l, to assess need for   possible angiogram  - Podiatry planning left foot partial 2nd ray resection , ok with vascular to proceed without vas intervention,

## 2020-09-02 NOTE — PROGRESS NOTE ADULT - ASSESSMENT
60 YO F with hx of HTN, DMT2, uncontrolled, A1c 9.6. Hx of PAD with 2 stents. Now presenting with dry gangrenous ulcer of 2nd L toe. No associated swelling. Xray of the foot with Possible Osteomyelitis. Wound Cx + S. Aureus skin lois.       Toe OM, diabetic toe ulcer, MSSA infection  - Continue ancef  - follow up OR findings  - DM control   - local care per podiatry

## 2020-09-02 NOTE — PROGRESS NOTE ADULT - SUBJECTIVE AND OBJECTIVE BOX
NESTOR VARGAS 59y [id number]    Patient is a 59y old  Female who presents with a chief complaint of Left 2nd toe gangrenous necrotic ulcer (01 Sep 2020 12:47)      Follow Up/CC:  ID following for toe ulcer    Interval History/ROS: going to OR today, no fever    Allergies    No Known Allergies    Intolerances        ANTIMICROBIALS:  ceFAZolin   IVPB 1000 every 8 hours      MEDICATIONS  (STANDING):  amLODIPine   Tablet 5 milliGRAM(s) Oral daily  aspirin  chewable 81 milliGRAM(s) Oral daily  atorvastatin 40 milliGRAM(s) Oral at bedtime  ceFAZolin   IVPB 1000 milliGRAM(s) IV Intermittent every 8 hours  clopidogrel Tablet 75 milliGRAM(s) Oral daily  collagenase Ointment 1 Application(s) Topical daily  dextrose 5%. 1000 milliLiter(s) (50 mL/Hr) IV Continuous <Continuous>  dextrose 50% Injectable 12.5 Gram(s) IV Push once  dextrose 50% Injectable 25 Gram(s) IV Push once  dextrose 50% Injectable 25 Gram(s) IV Push once  enoxaparin Injectable 40 milliGRAM(s) SubCutaneous daily  insulin glargine Injectable (LANTUS) 9 Unit(s) SubCutaneous at bedtime  insulin lispro (HumaLOG) corrective regimen sliding scale   SubCutaneous three times a day before meals  insulin lispro Injectable (HumaLOG) 5 Unit(s) SubCutaneous three times a day before meals  lactated ringers. 1000 milliLiter(s) (100 mL/Hr) IV Continuous <Continuous>  losartan 100 milliGRAM(s) Oral every 24 hours    MEDICATIONS  (PRN):  acetaminophen   Tablet .. 650 milliGRAM(s) Oral every 6 hours PRN Mild Pain (1 - 3)  acetaminophen   Tablet .. 650 milliGRAM(s) Oral every 6 hours PRN Severe Pain (7 - 10)  dextrose 40% Gel 15 Gram(s) Oral once PRN Blood Glucose LESS THAN 70 milliGRAM(s)/deciliter  dextrose 40% Gel 15 Gram(s) Oral once PRN Blood Glucose LESS THAN 70 milliGRAM(s)/deciliter  glucagon  Injectable 1 milliGRAM(s) IntraMuscular once PRN Glucose LESS THAN 70 milligrams/deciliter  HYDROmorphone  Injectable 0.5 milliGRAM(s) IV Push every 15 minutes PRN Moderate Pain (4 - 6)  ondansetron Injectable 4 milliGRAM(s) IV Push once PRN Nausea and/or Vomiting  oxycodone    5 mG/acetaminophen 325 mG 1 Tablet(s) Oral every 4 hours PRN Moderate Pain (4 - 6)        Vital Signs Last 24 Hrs  T(C): 37.2 (02 Sep 2020 13:05), Max: 37.2 (02 Sep 2020 13:05)  T(F): 99 (02 Sep 2020 13:05), Max: 99 (02 Sep 2020 13:05)  HR: 89 (02 Sep 2020 14:15) (71 - 116)  BP: 160/88 (02 Sep 2020 14:15) (156/76 - 186/93)  BP(mean): 110 (02 Sep 2020 14:15) (94 - 110)  RR: 16 (02 Sep 2020 14:15) (13 - 18)  SpO2: 98% (02 Sep 2020 14:15) (98% - 100%)    CBC Full  -  ( 02 Sep 2020 03:00 )  WBC Count : 7.60 K/uL  RBC Count : 4.22 M/uL  Hemoglobin : 11.9 g/dL  Hematocrit : 35.6 %  Platelet Count - Automated : 268 K/uL  Mean Cell Volume : 84.4 fL  Mean Cell Hemoglobin : 28.2 pg  Mean Cell Hemoglobin Concentration : 33.4 %  Auto Neutrophil # : x  Auto Lymphocyte # : x  Auto Monocyte # : x  Auto Eosinophil # : x  Auto Basophil # : x  Auto Neutrophil % : x  Auto Lymphocyte % : x  Auto Monocyte % : x  Auto Eosinophil % : x  Auto Basophil % : x    09-02    143  |  106  |  12  ----------------------------<  113<H>  3.9   |  26  |  0.64    Ca    9.5      02 Sep 2020 03:00            MICROBIOLOGY:  .Other wound L 2nd toe  08-27-20   Numerous Staphylococcus aureus  Normal skin lois isolated  --  Staphylococcus aureus      COVID-19 PCR . (08.27.20 @ 16:56)    COVID-19 PCR: NotDetec: Testing is performed using polymerase chain reaction (PCR) or  transcription mediated amplification (TMA). This COVID-19 (SARS-CoV-2)  nucleic acid amplification test was validated by [employer] and is  in use under the FDA Emergency Use Authorization (EUA) for clinical labs  CLIA-certified to perform high complexity testing. Test results should be  correlated with clinical presentation, patient history, and epidemiology.        RADIOLOGY    < from: Xray Chest 1 View AP/PA (08.27.20 @ 18:59) >  Clear lungs.    < end of copied text >

## 2020-09-02 NOTE — PROGRESS NOTE ADULT - SUBJECTIVE AND OBJECTIVE BOX
Patient is a 59y old  Female who presents with a chief complaint of Left 2nd toe gangrenous necrotic ulcer (01 Sep 2020 12:47)      SUBJECTIVE / OVERNIGHT EVENTS: patient seen and examined by bedside at 10 Am,  awake alert, no acute distress noted  pt scheduled for OR today        MEDICATIONS  (STANDING):  amLODIPine   Tablet 5 milliGRAM(s) Oral daily  aspirin  chewable 81 milliGRAM(s) Oral daily  atorvastatin 40 milliGRAM(s) Oral at bedtime  ceFAZolin   IVPB 1000 milliGRAM(s) IV Intermittent every 8 hours  clopidogrel Tablet 75 milliGRAM(s) Oral daily  collagenase Ointment 1 Application(s) Topical daily  dextrose 5%. 1000 milliLiter(s) (50 mL/Hr) IV Continuous <Continuous>  dextrose 50% Injectable 12.5 Gram(s) IV Push once  dextrose 50% Injectable 25 Gram(s) IV Push once  dextrose 50% Injectable 25 Gram(s) IV Push once  enoxaparin Injectable 40 milliGRAM(s) SubCutaneous daily  insulin glargine Injectable (LANTUS) 9 Unit(s) SubCutaneous at bedtime  insulin lispro (HumaLOG) corrective regimen sliding scale   SubCutaneous three times a day before meals  insulin lispro Injectable (HumaLOG) 5 Unit(s) SubCutaneous three times a day before meals  lactated ringers. 1000 milliLiter(s) (100 mL/Hr) IV Continuous <Continuous>  losartan 100 milliGRAM(s) Oral every 24 hours    MEDICATIONS  (PRN):  acetaminophen   Tablet .. 650 milliGRAM(s) Oral every 6 hours PRN Mild Pain (1 - 3)  acetaminophen   Tablet .. 650 milliGRAM(s) Oral every 6 hours PRN Severe Pain (7 - 10)  dextrose 40% Gel 15 Gram(s) Oral once PRN Blood Glucose LESS THAN 70 milliGRAM(s)/deciliter  dextrose 40% Gel 15 Gram(s) Oral once PRN Blood Glucose LESS THAN 70 milliGRAM(s)/deciliter  glucagon  Injectable 1 milliGRAM(s) IntraMuscular once PRN Glucose LESS THAN 70 milligrams/deciliter  HYDROmorphone  Injectable 0.5 milliGRAM(s) IV Push every 15 minutes PRN Moderate Pain (4 - 6)  ondansetron Injectable 4 milliGRAM(s) IV Push once PRN Nausea and/or Vomiting  oxycodone    5 mG/acetaminophen 325 mG 1 Tablet(s) Oral every 4 hours PRN Moderate Pain (4 - 6)      Vital Signs Last 24 Hrs  T(C): 37.2 (02 Sep 2020 13:05), Max: 37.2 (02 Sep 2020 13:05)  T(F): 99 (02 Sep 2020 13:05), Max: 99 (02 Sep 2020 13:05)  HR: 89 (02 Sep 2020 14:15) (71 - 116)  BP: 160/88 (02 Sep 2020 14:15) (156/76 - 186/93)  BP(mean): 110 (02 Sep 2020 14:15) (94 - 110)  RR: 16 (02 Sep 2020 14:15) (13 - 18)  SpO2: 98% (02 Sep 2020 14:15) (98% - 100%)  CAPILLARY BLOOD GLUCOSE      POCT Blood Glucose.: 131 mg/dL (02 Sep 2020 10:52)  POCT Blood Glucose.: 87 mg/dL (02 Sep 2020 06:41)  POCT Blood Glucose.: 124 mg/dL (02 Sep 2020 02:46)  POCT Blood Glucose.: 177 mg/dL (01 Sep 2020 21:37)  POCT Blood Glucose.: 328 mg/dL (01 Sep 2020 16:46)        PHYSICAL EXAM  GENERAL: NAD, overweight, non-toxic appearing  CHEST/LUNG: Clear to auscultation bilaterally; No wheeze  HEART: Regular rate and rhythm  ABDOMEN: Soft, Nontender, Nondistended; Bowel sounds present  EXTREMITIES:  2+ Peripheral Pulses, No clubbing, cyanosis, or edema. Left wrapped in dressing, c/d/i. Planter surface of left  to touch around 2nd toe.  PSYCH: AAOx3, cooperative, calm         LABS:                        11.9   7.60  )-----------( 268      ( 02 Sep 2020 03:00 )             35.6     09-02    143  |  106  |  12  ----------------------------<  113<H>  3.9   |  26  |  0.64    Ca    9.5      02 Sep 2020 03:00      PT/INR - ( 02 Sep 2020 03:00 )   PT: 12.0 SEC;   INR: 1.06          PTT - ( 02 Sep 2020 03:00 )  PTT:28.1 SEC          RADIOLOGY & ADDITIONAL TESTS:    Imaging Personally Reviewed:    Consultant(s) Notes Reviewed:      Care Discussed with Consultants/Other Providers:

## 2020-09-02 NOTE — CHART NOTE - NSCHARTNOTEFT_GEN_A_CORE
59 yr old F with Type 2 DM uncontrolled A1C 9.6% c/b neuropathy, PAD s/p PCI here with L 2nd OM.   8/30 - patient received Lantus 50 units at bedtime, hypoglycemia in AM 8/31, endocrine consulted  8/31 - patient received Lantus 18 units at bedtime, hypoglycemia at 3 AM 9/1    Unable to see patient at time of visit - in OR/PACU  Noted with tightly controlled glucose, 87 mg/dl this AM, while NPO  Given Lantus 9 units last night. Recommend for tonight Lantus 7 units SQ qHS  Patient with good response to Humalog 5 units pre-meal at dinner last evening. Once diet resumed, recommend Humalog 5 units TID   Continue Humalog LOW dose correctional scales  When diet is resumed, ensure consistent carbohydrate consideration  Endocrine will continue to follow    CAPILLARY BLOOD GLUCOSE    POCT Blood Glucose.: 131 mg/dL (02 Sep 2020 10:52)  POCT Blood Glucose.: 87 mg/dL (02 Sep 2020 06:41)  POCT Blood Glucose.: 124 mg/dL (02 Sep 2020 02:46)  POCT Blood Glucose.: 177 mg/dL (01 Sep 2020 21:37)  POCT Blood Glucose.: 328 mg/dL (01 Sep 2020 16:46)    09-02    143  |  106  |  12  ----------------------------<  113<H>  3.9   |  26  |  0.64    Ca    9.5      02 Sep 2020 03:00    MEDICATIONS  (STANDING):  amLODIPine   Tablet 5 milliGRAM(s) Oral daily  aspirin  chewable 81 milliGRAM(s) Oral daily  atorvastatin 40 milliGRAM(s) Oral at bedtime  ceFAZolin   IVPB 1000 milliGRAM(s) IV Intermittent every 8 hours  clopidogrel Tablet 75 milliGRAM(s) Oral daily  collagenase Ointment 1 Application(s) Topical daily  dextrose 5%. 1000 milliLiter(s) (50 mL/Hr) IV Continuous <Continuous>  dextrose 50% Injectable 12.5 Gram(s) IV Push once  dextrose 50% Injectable 25 Gram(s) IV Push once  dextrose 50% Injectable 25 Gram(s) IV Push once  enoxaparin Injectable 40 milliGRAM(s) SubCutaneous daily  insulin glargine Injectable (LANTUS) 9 Unit(s) SubCutaneous at bedtime  insulin lispro (HumaLOG) corrective regimen sliding scale   SubCutaneous three times a day before meals  insulin lispro Injectable (HumaLOG) 5 Unit(s) SubCutaneous three times a day before meals  lactated ringers. 1000 milliLiter(s) (100 mL/Hr) IV Continuous <Continuous>  losartan 100 milliGRAM(s) Oral every 24 hours    A1C with Estimated Average Glucose: 9.6 % (08-28-20 @ 06:38)  A1C with Estimated Average Glucose: 9.5 % (08-27-20 @ 15:16)

## 2020-09-02 NOTE — BRIEF OPERATIVE NOTE - SPECIMENS
path: 1- left foot 2nd toe 2- left foot 2nd digit proximal phalanx clean margin. micro: 1- left foot 2nd digit proximal phalanx clean margin.

## 2020-09-02 NOTE — PROGRESS NOTE ADULT - SUBJECTIVE AND OBJECTIVE BOX
VASCULAR SURGERY PROGRESS NOTE    INTERVAL EVENTS: No acute events overnight. Planning for podiatry amputation today       OBJECTIVE:    Vital Signs Last 24 Hrs  T(C): 36.9 (02 Sep 2020 06:26), Max: 36.9 (02 Sep 2020 06:26)  T(F): 98.5 (02 Sep 2020 06:26), Max: 98.5 (02 Sep 2020 06:26)  HR: 96 (02 Sep 2020 06:26) (88 - 98)  BP: 166/87 (02 Sep 2020 06:26) (156/83 - 173/94)  BP(mean): --  RR: 18 (02 Sep 2020 06:26) (18 - 18)  SpO2: 99% (02 Sep 2020 06:26) (99% - 100%)    General Appearance: Resting comfortably, no acute distress  Chest: non-labored breathing, no respiratory distress  CV: Pulse regular presently  Abdomen: Soft, non-tender, non-distended  Extremities: warm, Palpable right DP pulse and dopplerable PT, PT/DP dopplerable signal on left, toe very tender to palpation     I&O's Summary    I&O's Detail        LABS:                        11.9   7.60  )-----------( 268      ( 02 Sep 2020 03:00 )             35.6     09-02    143  |  106  |  12  ----------------------------<  113<H>  3.9   |  26  |  0.64    Ca    9.5      02 Sep 2020 03:00      PT/INR - ( 02 Sep 2020 03:00 )   PT: 12.0 SEC;   INR: 1.06          PTT - ( 02 Sep 2020 03:00 )  PTT:28.1 SEC      RADIOLOGY & ADDITIONAL STUDIES:

## 2020-09-02 NOTE — PROGRESS NOTE ADULT - PROBLEM SELECTOR PLAN 6
Hx of HLD  - d/c simvastatin 40mg qd --> recommend transitioning to high intensity statin, pt was on atorvastatin 40mg daily,  resumed

## 2020-09-02 NOTE — PROGRESS NOTE ADULT - SUBJECTIVE AND OBJECTIVE BOX
S: no chest pain or shortness of breath.   Review of systems otherwise (-)  	    MEDICATIONS  (STANDING):  amLODIPine   Tablet 5 milliGRAM(s) Oral daily  aspirin  chewable 81 milliGRAM(s) Oral daily  atorvastatin 40 milliGRAM(s) Oral at bedtime  ceFAZolin   IVPB 1000 milliGRAM(s) IV Intermittent every 8 hours  clopidogrel Tablet 75 milliGRAM(s) Oral daily  collagenase Ointment 1 Application(s) Topical daily  dextrose 5%. 1000 milliLiter(s) (50 mL/Hr) IV Continuous <Continuous>  dextrose 50% Injectable 12.5 Gram(s) IV Push once  dextrose 50% Injectable 25 Gram(s) IV Push once  dextrose 50% Injectable 25 Gram(s) IV Push once  enoxaparin Injectable 40 milliGRAM(s) SubCutaneous daily  insulin glargine Injectable (LANTUS) 9 Unit(s) SubCutaneous at bedtime  insulin lispro (HumaLOG) corrective regimen sliding scale   SubCutaneous three times a day before meals  insulin lispro Injectable (HumaLOG) 5 Unit(s) SubCutaneous three times a day before meals  lactated ringers. 1000 milliLiter(s) (100 mL/Hr) IV Continuous <Continuous>  losartan 100 milliGRAM(s) Oral every 24 hours    MEDICATIONS  (PRN):  acetaminophen   Tablet .. 650 milliGRAM(s) Oral every 6 hours PRN Mild Pain (1 - 3)  acetaminophen   Tablet .. 650 milliGRAM(s) Oral every 6 hours PRN Severe Pain (7 - 10)  dextrose 40% Gel 15 Gram(s) Oral once PRN Blood Glucose LESS THAN 70 milliGRAM(s)/deciliter  dextrose 40% Gel 15 Gram(s) Oral once PRN Blood Glucose LESS THAN 70 milliGRAM(s)/deciliter  glucagon  Injectable 1 milliGRAM(s) IntraMuscular once PRN Glucose LESS THAN 70 milligrams/deciliter  HYDROmorphone  Injectable 0.5 milliGRAM(s) IV Push every 15 minutes PRN Moderate Pain (4 - 6)  ondansetron Injectable 4 milliGRAM(s) IV Push once PRN Nausea and/or Vomiting  oxycodone    5 mG/acetaminophen 325 mG 1 Tablet(s) Oral every 4 hours PRN Moderate Pain (4 - 6)      LABS:                            11.9   7.60  )-----------( 268      ( 02 Sep 2020 03:00 )             35.6     143  |  106  |  12  ----------------------------<  113<H>  3.9   |  26  |  0.64    Ca    9.5      02 Sep 2020 03:00      Creatinine Trend: 0.64<--, 0.74<--, 0.71<--, 0.74<--   PT/INR - ( 02 Sep 2020 03:00 )   PT: 12.0 SEC;   INR: 1.06       PTT - ( 02 Sep 2020 03:00 )  PTT:28.1 SEC      PHYSICAL EXAM  Vital Signs Last 24 Hrs  T(C): 37.2 (02 Sep 2020 13:05), Max: 37.2 (02 Sep 2020 13:05)  T(F): 99 (02 Sep 2020 13:05), Max: 99 (02 Sep 2020 13:05)  HR: 71 (02 Sep 2020 14:00) (71 - 116)  BP: 159/82 (02 Sep 2020 14:00) (156/76 - 186/93)  BP(mean): 94 (02 Sep 2020 14:00) (94 - 106)  RR: 14 (02 Sep 2020 14:00) (13 - 18)  SpO2: 100% (02 Sep 2020 14:00) (98% - 100%)      Gen: Appears well in NAD  HEENT:  (-)icterus (-)pallor  CV: N S1 S2 1/6 RAY (+)2 Pulses B/l  Resp:  Clear to ausculatation B/L, normal effort  GI: (+) BS Soft, NT, ND  Lymph:  (-)Edema, (-)obvious lymphadenopathy  Skin: Warm to touch, Normal turgor  Psych: Appropriate mood and affect    ASSESSMENT/PLAN: 	    Patient is a 60 y/o Faroese speaking female known to our office with PMH of HTN, HLD, DM2, PAD s/p 2 stents in left leg (Angioplasty performed with Dr. Baker in [facility] in April 2020) with left 2nd toe ulcer who presents for worsening left 2nd toe pain. Cardiology consulted for preop clearance. Patient was previously seen in our office in 2018 and had a normal exercise NST with no evidence of stress induced ischemia or infarct as well as TTE with normal LV/RV function.    -s/p L 2nd toe amp today  - No evidence of clinical HF or anginal symptoms  - TTE noted above with normal LV/RV function  - Based on patient's RCRI score, would consider her low cardiac risk for any planned procedures. Patient is optimized from CV perspective for vascular procedure.  - No further inpatient cardiac w/u need prior to any vascular/podiatry procedures  - Follow up vascular and podiatry

## 2020-09-02 NOTE — BRIEF OPERATIVE NOTE - OPERATION/FINDINGS
s/p left foot partial 2nd toe amp  good intraop bleeding  low concern for infection  low concern for viability

## 2020-09-02 NOTE — PROGRESS NOTE ADULT - PROBLEM SELECTOR PLAN 4
Hx of DM. A1C 9.6, indicating poor glycemic control.  hypoglycemia improved    Pt NOT taking Lantus 100U Qhs and metformin 1000mg BID at home at home. Pt taking only Lantus 34-35 units at night and metformin in the morning as confirmed by pt and her daughter. Pt was also prescribed Novolog 14 units TID but not taking.    -pt was getting lantus 50 U BID, likely cause of hypoglycemia ,   -endo eval appreciated , recommend D5 at 30 ml/hr and to discontinue if FS persistently >200,   pt got Lantus 18 units qhs and  was hypoglycemic again ,lantus decreased to 9 units c/w Humalog 5 unitd AC   - Hold metformin while inpatient  I  - change from moderate corrective insulin sliding scale to low corrective insulin sliding scale  - diabetes education

## 2020-09-03 LAB
ANION GAP SERPL CALC-SCNC: 14 MMO/L — SIGNIFICANT CHANGE UP (ref 7–14)
BUN SERPL-MCNC: 16 MG/DL — SIGNIFICANT CHANGE UP (ref 7–23)
CALCIUM SERPL-MCNC: 10.2 MG/DL — SIGNIFICANT CHANGE UP (ref 8.4–10.5)
CHLORIDE SERPL-SCNC: 101 MMOL/L — SIGNIFICANT CHANGE UP (ref 98–107)
CO2 SERPL-SCNC: 26 MMOL/L — SIGNIFICANT CHANGE UP (ref 22–31)
CREAT SERPL-MCNC: 0.68 MG/DL — SIGNIFICANT CHANGE UP (ref 0.5–1.3)
GLUCOSE BLDC GLUCOMTR-MCNC: 192 MG/DL — HIGH (ref 70–99)
GLUCOSE BLDC GLUCOMTR-MCNC: 216 MG/DL — HIGH (ref 70–99)
GLUCOSE BLDC GLUCOMTR-MCNC: 224 MG/DL — HIGH (ref 70–99)
GLUCOSE BLDC GLUCOMTR-MCNC: 251 MG/DL — HIGH (ref 70–99)
GLUCOSE BLDC GLUCOMTR-MCNC: 253 MG/DL — HIGH (ref 70–99)
GLUCOSE SERPL-MCNC: 237 MG/DL — HIGH (ref 70–99)
HCT VFR BLD CALC: 40.6 % — SIGNIFICANT CHANGE UP (ref 34.5–45)
HGB BLD-MCNC: 13.1 G/DL — SIGNIFICANT CHANGE UP (ref 11.5–15.5)
MCHC RBC-ENTMCNC: 28.1 PG — SIGNIFICANT CHANGE UP (ref 27–34)
MCHC RBC-ENTMCNC: 32.3 % — SIGNIFICANT CHANGE UP (ref 32–36)
MCV RBC AUTO: 86.9 FL — SIGNIFICANT CHANGE UP (ref 80–100)
NRBC # FLD: 0 K/UL — SIGNIFICANT CHANGE UP (ref 0–0)
PLATELET # BLD AUTO: 301 K/UL — SIGNIFICANT CHANGE UP (ref 150–400)
PMV BLD: 10.3 FL — SIGNIFICANT CHANGE UP (ref 7–13)
POTASSIUM SERPL-MCNC: 4.4 MMOL/L — SIGNIFICANT CHANGE UP (ref 3.5–5.3)
POTASSIUM SERPL-SCNC: 4.4 MMOL/L — SIGNIFICANT CHANGE UP (ref 3.5–5.3)
RBC # BLD: 4.67 M/UL — SIGNIFICANT CHANGE UP (ref 3.8–5.2)
RBC # FLD: 13 % — SIGNIFICANT CHANGE UP (ref 10.3–14.5)
SODIUM SERPL-SCNC: 141 MMOL/L — SIGNIFICANT CHANGE UP (ref 135–145)
WBC # BLD: 11.12 K/UL — HIGH (ref 3.8–10.5)
WBC # FLD AUTO: 11.12 K/UL — HIGH (ref 3.8–10.5)

## 2020-09-03 PROCEDURE — 99232 SBSQ HOSP IP/OBS MODERATE 35: CPT

## 2020-09-03 RX ORDER — LINAGLIPTIN 5 MG/1
1 TABLET, FILM COATED ORAL
Qty: 30 | Refills: 0
Start: 2020-09-03 | End: 2020-10-02

## 2020-09-03 RX ORDER — INSULIN GLARGINE 100 [IU]/ML
18 INJECTION, SOLUTION SUBCUTANEOUS AT BEDTIME
Refills: 0 | Status: DISCONTINUED | OUTPATIENT
Start: 2020-09-03 | End: 2020-09-03

## 2020-09-03 RX ORDER — INSULIN GLARGINE 100 [IU]/ML
12 INJECTION, SOLUTION SUBCUTANEOUS AT BEDTIME
Refills: 0 | Status: DISCONTINUED | OUTPATIENT
Start: 2020-09-03 | End: 2020-09-04

## 2020-09-03 RX ORDER — ENOXAPARIN SODIUM 100 MG/ML
12 INJECTION SUBCUTANEOUS
Qty: 3 | Refills: 0
Start: 2020-09-03 | End: 2020-10-02

## 2020-09-03 RX ORDER — METFORMIN HYDROCHLORIDE 850 MG/1
1 TABLET ORAL
Qty: 60 | Refills: 0
Start: 2020-09-03 | End: 2020-10-02

## 2020-09-03 RX ORDER — METFORMIN HYDROCHLORIDE 850 MG/1
1 TABLET ORAL
Qty: 0 | Refills: 0 | DISCHARGE

## 2020-09-03 RX ORDER — INSULIN LISPRO 100/ML
6 VIAL (ML) SUBCUTANEOUS
Refills: 0 | Status: DISCONTINUED | OUTPATIENT
Start: 2020-09-03 | End: 2020-09-04

## 2020-09-03 RX ORDER — ISOPROPYL ALCOHOL, BENZOCAINE .7; .06 ML/ML; ML/ML
1 SWAB TOPICAL
Qty: 100 | Refills: 1
Start: 2020-09-03 | End: 2020-10-22

## 2020-09-03 RX ADMIN — OXYCODONE AND ACETAMINOPHEN 1 TABLET(S): 5; 325 TABLET ORAL at 19:56

## 2020-09-03 RX ADMIN — ATORVASTATIN CALCIUM 40 MILLIGRAM(S): 80 TABLET, FILM COATED ORAL at 21:14

## 2020-09-03 RX ADMIN — Medication 100 MILLIGRAM(S): at 14:30

## 2020-09-03 RX ADMIN — Medication 1: at 17:30

## 2020-09-03 RX ADMIN — Medication 6 UNIT(S): at 12:39

## 2020-09-03 RX ADMIN — OXYCODONE AND ACETAMINOPHEN 1 TABLET(S): 5; 325 TABLET ORAL at 20:34

## 2020-09-03 RX ADMIN — LOSARTAN POTASSIUM 100 MILLIGRAM(S): 100 TABLET, FILM COATED ORAL at 21:14

## 2020-09-03 RX ADMIN — AMLODIPINE BESYLATE 5 MILLIGRAM(S): 2.5 TABLET ORAL at 05:52

## 2020-09-03 RX ADMIN — ENOXAPARIN SODIUM 40 MILLIGRAM(S): 100 INJECTION SUBCUTANEOUS at 12:42

## 2020-09-03 RX ADMIN — Medication 3 MILLIGRAM(S): at 21:14

## 2020-09-03 RX ADMIN — CLOPIDOGREL BISULFATE 75 MILLIGRAM(S): 75 TABLET, FILM COATED ORAL at 12:42

## 2020-09-03 RX ADMIN — Medication 2: at 08:39

## 2020-09-03 RX ADMIN — Medication 100 MILLIGRAM(S): at 21:13

## 2020-09-03 RX ADMIN — Medication 6 UNIT(S): at 17:30

## 2020-09-03 RX ADMIN — Medication 3: at 12:39

## 2020-09-03 RX ADMIN — Medication 81 MILLIGRAM(S): at 12:42

## 2020-09-03 RX ADMIN — INSULIN GLARGINE 12 UNIT(S): 100 INJECTION, SOLUTION SUBCUTANEOUS at 22:30

## 2020-09-03 RX ADMIN — Medication 100 MILLIGRAM(S): at 05:52

## 2020-09-03 RX ADMIN — Medication 5 UNIT(S): at 08:39

## 2020-09-03 NOTE — PROGRESS NOTE ADULT - PROBLEM SELECTOR PLAN 3
Hx of PAD s/p 2 stents   - C/w ASA and plavix   - JONAH/PVR noted, vascular f/u appreciated    - pending arterial duplex of LE b/l, to assess need for   possible angiogram  - Podiatry planning left foot partial 2nd ray resection , ok with vascular to proceed without vas intervention, Hx of DM. A1C 9.6, indicating poor glycemic control.  hypoglycemia  resolved    Pt NOT taking Lantus 100U Qhs and metformin 1000mg BID at home at home. Pt taking only Lantus 34-35 units at night and metformin in the morning as confirmed by pt and her daughter. Pt was also prescribed Novolog 14 units TID but not taking.    -pt was getting lantus 50 U BID, likely cause of hypoglycemia ,   -endo eval appreciated ,   - pt not NPo any more, agree with increasing Lantus to 18 U qhs units c/w Humalog 6  units  AC   - Hold metformin while inpatient  - c/w  low corrective insulin sliding scale  - diabetes education

## 2020-09-03 NOTE — PROGRESS NOTE ADULT - PROBLEM SELECTOR PLAN 5
Hx of HTN.  - C/w norvasc 2.5mg qd and Losartan 100 mg qd Hx of HLD  - d/c simvastatin 40mg qd --> recommend transitioning to high intensity statin, pt was on atorvastatin 40mg daily,  resumed

## 2020-09-03 NOTE — PROGRESS NOTE ADULT - ASSESSMENT
Ms. Booker is a 58 yo woman with PMHx of HTN, HLD, DM2, PAD s/p 2 stents in left leg with left 2nd toe ulcer presenting with worsening left 2nd toe pain, found to have left foot with second toe gangrene and exposed bone, most consistent w/ clinical osteomyelitis.

## 2020-09-03 NOTE — PROGRESS NOTE ADULT - PROBLEM SELECTOR PLAN 4
Hx of DM. A1C 9.6, indicating poor glycemic control.  hypoglycemia improved    Pt NOT taking Lantus 100U Qhs and metformin 1000mg BID at home at home. Pt taking only Lantus 34-35 units at night and metformin in the morning as confirmed by pt and her daughter. Pt was also prescribed Novolog 14 units TID but not taking.    -pt was getting lantus 50 U BID, likely cause of hypoglycemia ,   -endo eval appreciated , recommend D5 at 30 ml/hr and to discontinue if FS persistently >200,   pt got Lantus 18 units qhs and  was hypoglycemic again ,lantus decreased to 9 units c/w Humalog 5 unitd AC   - Hold metformin while inpatient  I  - change from moderate corrective insulin sliding scale to low corrective insulin sliding scale  - diabetes education Hx of HTN.  - C/w norvasc 2.5mg qd and Losartan 100 mg qd

## 2020-09-03 NOTE — PROGRESS NOTE ADULT - SUBJECTIVE AND OBJECTIVE BOX
ANESTHESIA POSTOP CHECK    59y Female POSTOP DAY 1 S/P L 2nd toe amputation    Vital Signs Last 24 Hrs  T(C): 36.7 (03 Sep 2020 05:46), Max: 37.2 (02 Sep 2020 13:05)  T(F): 98.1 (03 Sep 2020 05:46), Max: 99 (02 Sep 2020 13:05)  HR: 78 (03 Sep 2020 05:46) (71 - 116)  BP: 146/73 (03 Sep 2020 05:46) (146/73 - 186/93)  BP(mean): 110 (02 Sep 2020 14:15) (94 - 110)  RR: 16 (03 Sep 2020 05:46) (13 - 18)  SpO2: 100% (03 Sep 2020 05:46) (98% - 100%)  I&O's Summary      [x ] NO APPARENT ANESTHESIA COMPLICATIONS      Comments:

## 2020-09-03 NOTE — PHARMACOTHERAPY INTERVENTION NOTE - COMMENTS
Patient preferred daughter, Juliana, translate via phone. Pt educated on her A1c, basal insulin pen administration, hypoglycemia and treatment, healthy plate/food options, and when to check BG, pt verbalized understanding. Pt states she injects insulin in belly button(?) - advised her and daughter to only inject the outer thighs or abdomen, 2 inches away from belly button. RN will have pt self-administer today to make sure pt understood. Pt had difficulty with insulin pen demonstration (did not have glasses, so taught her to count clicks) - she did not do 2 unit test dose (even after multiple demonstrations by myself) and did not hold for 10 seconds. Pt only checks once a day in the morning - advised to check in the afternoon prior to meal as well (since A1c suggests her average BG is >200  mG/dL and pt is reports BG <100 mG/dL at home). Family would prefer orals (possibly prandin) before meals (vs. insulin). Counseled that Prandin is only given before meals (if she skips a meals, she skips the medicine). Counseled on rotating injection site, proper storage, and sharps disposal. Pt encouraged for outpt f/u with medicine and endocrine - has PCP, pt needs an endocrine physician (emailed [facility]). Patient preferred daughter, Juliana, translate via phone. Pt educated on her A1c, long-term effects of hyperglycemia, basal insulin pen administration, hypoglycemia and treatment, healthy plate/food options, and when to check BG, pt verbalized understanding. Pt states she injects insulin in belly button(?) - advised her and daughter to only inject the outer thighs or abdomen, 2 inches away from belly button. RN will have pt self-administer today to make sure pt understood. Pt had difficulty with insulin pen demonstration (did not have glasses, so taught her to count clicks) - she did not do 2 unit test dose (even after multiple demonstrations by myself) and did not hold for 10 seconds. Pt only checks once a day in the morning - advised to check in the afternoon prior to meal as well (since A1c suggests her average BG is >200  mG/dL and pt is reports BG <100 mG/dL at home). Family would prefer orals (possibly prandin) before meals (vs. insulin). Counseled that Prandin is only given before meals (if she skips a meals, she skips the medicine). Counseled on rotating injection site, proper storage, and sharps disposal. Daughter states patient's diet consists of oatmeal, bread, rice (no juices or sodas)- went over portions and ensuring meals have protein/vegetables as well (healthy plate). Pt encouraged for outpt f/u with medicine and endocrine - has PCP, pt needs an endocrine physician (emailed [facility]). Mandie Kaur

## 2020-09-03 NOTE — PROGRESS NOTE ADULT - SUBJECTIVE AND OBJECTIVE BOX
Patient is a 59y old  Female who presents with a chief complaint of Left 2nd toe gangrenous necrotic ulcer (01 Sep 2020 12:47)      SUBJECTIVE / OVERNIGHT EVENTS:    MEDICATIONS  (STANDING):  amLODIPine   Tablet 5 milliGRAM(s) Oral daily  aspirin  chewable 81 milliGRAM(s) Oral daily  atorvastatin 40 milliGRAM(s) Oral at bedtime  ceFAZolin   IVPB 1000 milliGRAM(s) IV Intermittent every 8 hours  clopidogrel Tablet 75 milliGRAM(s) Oral daily  collagenase Ointment 1 Application(s) Topical daily  dextrose 5%. 1000 milliLiter(s) (50 mL/Hr) IV Continuous <Continuous>  dextrose 50% Injectable 12.5 Gram(s) IV Push once  dextrose 50% Injectable 25 Gram(s) IV Push once  dextrose 50% Injectable 25 Gram(s) IV Push once  enoxaparin Injectable 40 milliGRAM(s) SubCutaneous daily  insulin glargine Injectable (LANTUS) 9 Unit(s) SubCutaneous at bedtime  insulin lispro (HumaLOG) corrective regimen sliding scale   SubCutaneous three times a day before meals  insulin lispro Injectable (HumaLOG) 5 Unit(s) SubCutaneous three times a day before meals  losartan 100 milliGRAM(s) Oral every 24 hours  melatonin 3 milliGRAM(s) Oral at bedtime    MEDICATIONS  (PRN):  acetaminophen   Tablet .. 650 milliGRAM(s) Oral every 6 hours PRN Mild Pain (1 - 3)  acetaminophen   Tablet .. 650 milliGRAM(s) Oral every 6 hours PRN Severe Pain (7 - 10)  dextrose 40% Gel 15 Gram(s) Oral once PRN Blood Glucose LESS THAN 70 milliGRAM(s)/deciliter  dextrose 40% Gel 15 Gram(s) Oral once PRN Blood Glucose LESS THAN 70 milliGRAM(s)/deciliter  glucagon  Injectable 1 milliGRAM(s) IntraMuscular once PRN Glucose LESS THAN 70 milligrams/deciliter  oxycodone    5 mG/acetaminophen 325 mG 1 Tablet(s) Oral every 4 hours PRN Moderate Pain (4 - 6)      Vital Signs Last 24 Hrs  T(C): 36.7 (03 Sep 2020 05:46), Max: 37.2 (02 Sep 2020 13:05)  T(F): 98.1 (03 Sep 2020 05:46), Max: 99 (02 Sep 2020 13:05)  HR: 78 (03 Sep 2020 05:46) (71 - 116)  BP: 146/73 (03 Sep 2020 05:46) (146/73 - 186/93)  BP(mean): 110 (02 Sep 2020 14:15) (94 - 110)  RR: 16 (03 Sep 2020 05:46) (13 - 18)  SpO2: 100% (03 Sep 2020 05:46) (98% - 100%)  CAPILLARY BLOOD GLUCOSE      POCT Blood Glucose.: 216 mg/dL (03 Sep 2020 08:32)  POCT Blood Glucose.: 224 mg/dL (03 Sep 2020 04:32)  POCT Blood Glucose.: 315 mg/dL (02 Sep 2020 22:11)  POCT Blood Glucose.: 156 mg/dL (02 Sep 2020 17:04)  POCT Blood Glucose.: 131 mg/dL (02 Sep 2020 10:52)    I&O's Summary      PHYSICAL EXAM:  GENERAL: NAD, well-developed  HEAD:  Atraumatic, Normocephalic  EYES: EOMI, PERRLA, conjunctiva and sclera clear  NECK: Supple, No JVD  CHEST/LUNG: Clear to auscultation bilaterally; No wheeze  HEART: Regular rate and rhythm; No murmurs, rubs, or gallops  ABDOMEN: Soft, Nontender, Nondistended; Bowel sounds present  EXTREMITIES:  2+ Peripheral Pulses, No clubbing, cyanosis, or edema  PSYCH: AAOx3  NEUROLOGY: non-focal  SKIN: No rashes or lesions    LABS:                        13.1   11.12 )-----------( 301      ( 03 Sep 2020 06:03 )             40.6     09-03    141  |  101  |  16  ----------------------------<  237<H>  4.4   |  26  |  0.68    Ca    10.2      03 Sep 2020 06:03      PT/INR - ( 02 Sep 2020 03:00 )   PT: 12.0 SEC;   INR: 1.06          PTT - ( 02 Sep 2020 03:00 )  PTT:28.1 SEC          RADIOLOGY & ADDITIONAL TESTS:    Imaging Personally Reviewed:    Consultant(s) Notes Reviewed:      Care Discussed with Consultants/Other Providers: Patient is a 59y old  Female who presents with a chief complaint of Left 2nd toe gangrenous necrotic ulcer (01 Sep 2020 12:47)      SUBJECTIVE / OVERNIGHT EVENTS: patient seen and examined by bedside, feeling good , pain in left foot well controlled, denies fever, chills ,headache, dizziness, SOB, CP, Palpitations , N/V/D, abdominal pain        MEDICATIONS  (STANDING):  amLODIPine   Tablet 5 milliGRAM(s) Oral daily  aspirin  chewable 81 milliGRAM(s) Oral daily  atorvastatin 40 milliGRAM(s) Oral at bedtime  ceFAZolin   IVPB 1000 milliGRAM(s) IV Intermittent every 8 hours  clopidogrel Tablet 75 milliGRAM(s) Oral daily  collagenase Ointment 1 Application(s) Topical daily  dextrose 5%. 1000 milliLiter(s) (50 mL/Hr) IV Continuous <Continuous>  dextrose 50% Injectable 12.5 Gram(s) IV Push once  dextrose 50% Injectable 25 Gram(s) IV Push once  dextrose 50% Injectable 25 Gram(s) IV Push once  enoxaparin Injectable 40 milliGRAM(s) SubCutaneous daily  insulin glargine Injectable (LANTUS) 9 Unit(s) SubCutaneous at bedtime  insulin lispro (HumaLOG) corrective regimen sliding scale   SubCutaneous three times a day before meals  insulin lispro Injectable (HumaLOG) 5 Unit(s) SubCutaneous three times a day before meals  losartan 100 milliGRAM(s) Oral every 24 hours  melatonin 3 milliGRAM(s) Oral at bedtime    MEDICATIONS  (PRN):  acetaminophen   Tablet .. 650 milliGRAM(s) Oral every 6 hours PRN Mild Pain (1 - 3)  acetaminophen   Tablet .. 650 milliGRAM(s) Oral every 6 hours PRN Severe Pain (7 - 10)  dextrose 40% Gel 15 Gram(s) Oral once PRN Blood Glucose LESS THAN 70 milliGRAM(s)/deciliter  dextrose 40% Gel 15 Gram(s) Oral once PRN Blood Glucose LESS THAN 70 milliGRAM(s)/deciliter  glucagon  Injectable 1 milliGRAM(s) IntraMuscular once PRN Glucose LESS THAN 70 milligrams/deciliter  oxycodone    5 mG/acetaminophen 325 mG 1 Tablet(s) Oral every 4 hours PRN Moderate Pain (4 - 6)      Vital Signs Last 24 Hrs  T(C): 36.7 (03 Sep 2020 05:46), Max: 37.2 (02 Sep 2020 13:05)  T(F): 98.1 (03 Sep 2020 05:46), Max: 99 (02 Sep 2020 13:05)  HR: 78 (03 Sep 2020 05:46) (71 - 116)  BP: 146/73 (03 Sep 2020 05:46) (146/73 - 186/93)  BP(mean): 110 (02 Sep 2020 14:15) (94 - 110)  RR: 16 (03 Sep 2020 05:46) (13 - 18)  SpO2: 100% (03 Sep 2020 05:46) (98% - 100%)  CAPILLARY BLOOD GLUCOSE      POCT Blood Glucose.: 216 mg/dL (03 Sep 2020 08:32)  POCT Blood Glucose.: 224 mg/dL (03 Sep 2020 04:32)  POCT Blood Glucose.: 315 mg/dL (02 Sep 2020 22:11)  POCT Blood Glucose.: 156 mg/dL (02 Sep 2020 17:04)  POCT Blood Glucose.: 131 mg/dL (02 Sep 2020 10:52)    I&O's Summary        PHYSICAL EXAM  GENERAL: NAD, overweight, non-toxic appearing  CHEST/LUNG: Clear to auscultation bilaterally; No wheeze  HEART: Regular rate and rhythm  ABDOMEN: Soft, Nontender, Nondistended; Bowel sounds present  EXTREMITIES:  2+ Peripheral Pulses, No clubbing, cyanosis, or edema. Left foot  wrapped in dressing, c/d/i.  PSYCH: AAOx3, cooperative, calm         LABS:                        13.1   11.12 )-----------( 301      ( 03 Sep 2020 06:03 )             40.6     09-03    141  |  101  |  16  ----------------------------<  237<H>  4.4   |  26  |  0.68    Ca    10.2      03 Sep 2020 06:03      PT/INR - ( 02 Sep 2020 03:00 )   PT: 12.0 SEC;   INR: 1.06          PTT - ( 02 Sep 2020 03:00 )  PTT:28.1 SEC          RADIOLOGY & ADDITIONAL TESTS:    Imaging Personally Reviewed:  < from: VA Duplex Arterial Lower Ext, Bilateral. (09.02.20 @ 11:52) >  Summary/Impressions:  1. High-grade (>75%) stenosis noted at the mid-distal left  SFA. There is three-vessel runoff to the distal left  ankle.  2. No significant occlusive arterial disease noted in the  proximal right lower extremity.  There is two-vessel  runoff to the distal right ankle wvia the GUIDO and PTA.    < end of copied text >    Consultant(s) Notes Reviewed:  cardiology, ID, Vascular,     Care Discussed with Consultants/Other Providers:

## 2020-09-03 NOTE — PROGRESS NOTE ADULT - PROBLEM SELECTOR PLAN 6
Hx of HLD  - d/c simvastatin 40mg qd --> recommend transitioning to high intensity statin, pt was on atorvastatin 40mg daily,  resumed F: No IVF  E: Replete prn  N: DASH/TLC consistent carb

## 2020-09-03 NOTE — PROGRESS NOTE ADULT - SUBJECTIVE AND OBJECTIVE BOX
S: no chest pain or shortness of breath.   Review of systems otherwise (-)  	    MEDICATIONS  (STANDING):  amLODIPine   Tablet 5 milliGRAM(s) Oral daily  aspirin  chewable 81 milliGRAM(s) Oral daily  atorvastatin 40 milliGRAM(s) Oral at bedtime  ceFAZolin   IVPB 1000 milliGRAM(s) IV Intermittent every 8 hours  clopidogrel Tablet 75 milliGRAM(s) Oral daily  collagenase Ointment 1 Application(s) Topical daily  dextrose 5%. 1000 milliLiter(s) (50 mL/Hr) IV Continuous <Continuous>  dextrose 50% Injectable 12.5 Gram(s) IV Push once  dextrose 50% Injectable 25 Gram(s) IV Push once  dextrose 50% Injectable 25 Gram(s) IV Push once  enoxaparin Injectable 40 milliGRAM(s) SubCutaneous daily  insulin glargine Injectable (LANTUS) 18 Unit(s) SubCutaneous at bedtime  insulin lispro (HumaLOG) corrective regimen sliding scale   SubCutaneous three times a day before meals  insulin lispro Injectable (HumaLOG) 6 Unit(s) SubCutaneous three times a day before meals  losartan 100 milliGRAM(s) Oral every 24 hours  melatonin 3 milliGRAM(s) Oral at bedtime    MEDICATIONS  (PRN):  acetaminophen   Tablet .. 650 milliGRAM(s) Oral every 6 hours PRN Mild Pain (1 - 3)  acetaminophen   Tablet .. 650 milliGRAM(s) Oral every 6 hours PRN Severe Pain (7 - 10)  dextrose 40% Gel 15 Gram(s) Oral once PRN Blood Glucose LESS THAN 70 milliGRAM(s)/deciliter  dextrose 40% Gel 15 Gram(s) Oral once PRN Blood Glucose LESS THAN 70 milliGRAM(s)/deciliter  glucagon  Injectable 1 milliGRAM(s) IntraMuscular once PRN Glucose LESS THAN 70 milligrams/deciliter  oxycodone    5 mG/acetaminophen 325 mG 1 Tablet(s) Oral every 4 hours PRN Moderate Pain (4 - 6)      LABS:                      13.1   11.12 )-----------( 301      ( 03 Sep 2020 06:03 )             40.6     141  |  101  |  16  ----------------------------<  237<H>  4.4   |  26  |  0.68    Ca    10.2      03 Sep 2020 06:03    Creatinine Trend: 0.68<--, 0.64<--, 0.74<--, 0.71<--, 0.74<--     PHYSICAL EXAM  Vital Signs Last 24 Hrs  T(C): 37.3 (03 Sep 2020 13:30), Max: 37.3 (03 Sep 2020 13:30)  T(F): 99.1 (03 Sep 2020 13:30), Max: 99.1 (03 Sep 2020 13:30)  HR: 90 (03 Sep 2020 13:30) (78 - 102)  BP: 142/65 (03 Sep 2020 13:30) (142/65 - 183/84)  BP(mean): --  RR: 18 (03 Sep 2020 13:30) (16 - 18)  SpO2: 99% (03 Sep 2020 13:30) (99% - 100%)      Gen: Appears well in NAD  HEENT:  (-)icterus (-)pallor  CV: N S1 S2 1/6 RAY (+)2 Pulses B/l  Resp:  Clear to ausculatation B/L, normal effort  GI: (+) BS Soft, NT, ND  Lymph:  (-)Edema, (-)obvious lymphadenopathy  Skin: Warm to touch, Normal turgor  Psych: Appropriate mood and affect    ASSESSMENT/PLAN: 	    Patient is a 60 y/o Arabic speaking female known to our office with PMH of HTN, HLD, DM2, PAD s/p 2 stents in left leg (Angioplasty performed with Dr. Baker in [facility] in April 2020) with left 2nd toe ulcer who presents for worsening left 2nd toe pain. Cardiology consulted for preop clearance. Patient was previously seen in our office in 2018 and had a normal exercise NST with no evidence of stress induced ischemia or infarct as well as TTE with normal LV/RV function.    - s/p L 2nd toe amp   - tolerated procedure well from CV perspective  - TTE with normal LV/RV function  - No further inpatient cardiac w/u needed  - Follow up vascular and podiatry

## 2020-09-03 NOTE — PROGRESS NOTE ADULT - ASSESSMENT
58 YO F with hx of HTN, DMT2, uncontrolled, A1c 9.6. Hx of PAD with 2 stents. Now presenting with dry gangrenous ulcer of 2nd L toe. No associated swelling. Xray of the foot with Possible Osteomyelitis. Wound Cx + S. Aureus skin lois.       Toe OM, diabetic toe ulcer, MSSA infection  - Continue ancef  - OR findings -low concern for residual infection  - DM control   - local care per podiatry   - hope to change to keflex in AM

## 2020-09-03 NOTE — PROGRESS NOTE ADULT - ASSESSMENT
60 y/o F with PMH HTN, HLD, DM2, PAD s/p 2 stents in left leg (Angioplasty performed with Dr. Baker in [facility] in April 2020) with left 2nd toe ulcer who presents for worsening left 2nd toe pain. Foot xray with concerns for osteomyelitis. She now has JONAH/PVR that demonstrate left toe pressure of 55 and JONAH of 0.61. She had angioplasty in April 2020 at [facility] but wound worsened and was more painful starting in June 2020. We are consulted for possible need for LLE revascularization.    - Adequate bleeding during podiatry case  - Outpatient angio records show adequate blood flow to extremity   - Please page back with any further questions       Vascular Surgery [id number] 58 y/o F with PMH HTN, HLD, DM2, PAD s/p 2 stents in left leg (Angioplasty performed with Dr. Baker in [facility] in April 2020) with left 2nd toe ulcer who presents for worsening left 2nd toe pain. Foot xray with concerns for osteomyelitis. She now has JONAH/PVR that demonstrate left toe pressure of 55 and JONAH of 0.61. She had angioplasty in April 2020 at [facility] but wound worsened and was more painful starting in June 2020. We are consulted for possible need for LLE revascularization.    - Adequate bleeding during podiatry case  - Outpatient angio records show adequate blood flow to extremity   - Please page back with any further questions   - Patient to follow up with her vascular surgeon or Dr Forman per patient preference    Vascular Surgery [id number]

## 2020-09-03 NOTE — PROGRESS NOTE ADULT - SUBJECTIVE AND OBJECTIVE BOX
VASCULAR SURGERY PROGRESS NOTE    INTERVAL EVENTS: Podiatry performed amputation with adequate bleeding       OBJECTIVE:    Vital Signs Last 24 Hrs  T(C): 36.7 (03 Sep 2020 05:46), Max: 37.2 (02 Sep 2020 13:05)  T(F): 98.1 (03 Sep 2020 05:46), Max: 99 (02 Sep 2020 13:05)  HR: 78 (03 Sep 2020 05:46) (71 - 116)  BP: 146/73 (03 Sep 2020 05:46) (146/73 - 186/93)  BP(mean): 110 (02 Sep 2020 14:15) (94 - 110)  RR: 16 (03 Sep 2020 05:46) (13 - 18)  SpO2: 100% (03 Sep 2020 05:46) (98% - 100%)    General Appearance: Resting comfortably, no acute distress  Chest: non-labored breathing, no respiratory distress  CV: Pulse regular presently  Abdomen: Soft, non-tender, non-distended  Extremities: warm, foto wrapped with no strike through on bandage, mildly tender to palpation     I&O's Summary    I&O's Detail        LABS:                        13.1   11.12 )-----------( 301      ( 03 Sep 2020 06:03 )             40.6     09-03    141  |  101  |  16  ----------------------------<  237<H>  4.4   |  26  |  0.68    Ca    10.2      03 Sep 2020 06:03      PT/INR - ( 02 Sep 2020 03:00 )   PT: 12.0 SEC;   INR: 1.06          PTT - ( 02 Sep 2020 03:00 )  PTT:28.1 SEC      RADIOLOGY & ADDITIONAL STUDIES:

## 2020-09-03 NOTE — PROGRESS NOTE ADULT - PROBLEM SELECTOR PLAN 2
- RCRI score 1 - class II risk, 6.0% risk of death, MI or cardiac arrest. Brown score 1.6%. Can complete <4 METS 2/2 pain. No chest pain or SOB with exertion. no prior surgeries in the past.   TTE noted, with normal LV and RV function, mild diastolic function  Pt's cardiologist group notified, cardiology clearance noted , pt low risk for planned procedure Hx of PAD s/p 2 stents   - C/w ASA and plavix   - JONAH/PVR noted,  - arterial duplex of LE b/l noted as above Vascular f/u noted, As per vascular adequate bleeding during podiatry case and Outpatient angio records show adequate blood flow to extremity   - Vascular recommend outpt f/u with her vascular or DR Forman as per her preferences     - Podiatry planning left foot partial 2nd ray resection , ok with vascular to proceed without vas intervention,

## 2020-09-03 NOTE — PROGRESS NOTE ADULT - PROBLEM SELECTOR PLAN 8
DVT ppx: lovenox 40mg qd

## 2020-09-03 NOTE — PROGRESS NOTE ADULT - PROBLEM SELECTOR PLAN 1
Ongoing L 2nd toe foot ulcer/gangrene. Afebrile. No leukocytosis. CRP negative. ESR 34. Xray with 2nd toe soft tissue swelling with focal ulceration at tip. In addition, eroded and indistinct underlying 2nd distal phalangeal tuft cortical margins consistent with radiographic changes of osteomyelitis. No tracking gas collections beyond this region and no additional focal areas of osteomyelitis.   Podiatry following the case  -pending left foot partial 2nd ray resection. podiatry plan for sx today   -  was on  vanc/cefepime for now, ID f/u appreciated, Abx changed to Ancef Ongoing L 2nd toe foot ulcer/gangrene. Afebrile. No leukocytosis. CRP negative. ESR 34. Xray with 2nd toe soft tissue swelling with focal ulceration at tip. In addition, eroded and indistinct underlying 2nd distal phalangeal tuft cortical margins consistent with radiographic changes of osteomyelitis. No tracking gas collections beyond this region and no additional focal areas of osteomyelitis.   Podiatry following the case  - s/p  left foot partial 2nd ray resection on 9/2    -  was on  vanc/cefepime , , Abx changed to Ancef as MSSA , ID f/u  appreciated, low concern for residual infection, change to Po keflex likely in am  mild leucocytosis likely reactive to the sx,   will f/u cx from OR

## 2020-09-03 NOTE — PROGRESS NOTE ADULT - SUBJECTIVE AND OBJECTIVE BOX
NESTOR VARGAS 59y [id number]    Patient is a 59y old  Female who presents with a chief complaint of Left 2nd toe gangrenous necrotic ulcer (01 Sep 2020 12:47)      Follow Up/CC:  ID following for toe infection    Interval History/ROS: s/p toe amputation, no fever, some pain     Allergies    No Known Allergies    Intolerances        ANTIMICROBIALS:  ceFAZolin   IVPB 1000 every 8 hours      MEDICATIONS  (STANDING):  amLODIPine   Tablet 5 milliGRAM(s) Oral daily  aspirin  chewable 81 milliGRAM(s) Oral daily  atorvastatin 40 milliGRAM(s) Oral at bedtime  ceFAZolin   IVPB 1000 milliGRAM(s) IV Intermittent every 8 hours  clopidogrel Tablet 75 milliGRAM(s) Oral daily  collagenase Ointment 1 Application(s) Topical daily  dextrose 5%. 1000 milliLiter(s) (50 mL/Hr) IV Continuous <Continuous>  dextrose 50% Injectable 12.5 Gram(s) IV Push once  dextrose 50% Injectable 25 Gram(s) IV Push once  dextrose 50% Injectable 25 Gram(s) IV Push once  enoxaparin Injectable 40 milliGRAM(s) SubCutaneous daily  insulin glargine Injectable (LANTUS) 18 Unit(s) SubCutaneous at bedtime  insulin lispro (HumaLOG) corrective regimen sliding scale   SubCutaneous three times a day before meals  insulin lispro Injectable (HumaLOG) 6 Unit(s) SubCutaneous three times a day before meals  losartan 100 milliGRAM(s) Oral every 24 hours  melatonin 3 milliGRAM(s) Oral at bedtime    MEDICATIONS  (PRN):  acetaminophen   Tablet .. 650 milliGRAM(s) Oral every 6 hours PRN Mild Pain (1 - 3)  acetaminophen   Tablet .. 650 milliGRAM(s) Oral every 6 hours PRN Severe Pain (7 - 10)  dextrose 40% Gel 15 Gram(s) Oral once PRN Blood Glucose LESS THAN 70 milliGRAM(s)/deciliter  dextrose 40% Gel 15 Gram(s) Oral once PRN Blood Glucose LESS THAN 70 milliGRAM(s)/deciliter  glucagon  Injectable 1 milliGRAM(s) IntraMuscular once PRN Glucose LESS THAN 70 milligrams/deciliter  oxycodone    5 mG/acetaminophen 325 mG 1 Tablet(s) Oral every 4 hours PRN Moderate Pain (4 - 6)        Vital Signs Last 24 Hrs  T(C): 37.3 (03 Sep 2020 13:30), Max: 37.3 (03 Sep 2020 13:30)  T(F): 99.1 (03 Sep 2020 13:30), Max: 99.1 (03 Sep 2020 13:30)  HR: 90 (03 Sep 2020 13:30) (78 - 102)  BP: 142/65 (03 Sep 2020 13:30) (142/65 - 183/84)  BP(mean): 110 (02 Sep 2020 14:15) (110 - 110)  RR: 18 (03 Sep 2020 13:30) (16 - 18)  SpO2: 99% (03 Sep 2020 13:30) (98% - 100%)    CBC Full  -  ( 03 Sep 2020 06:03 )  WBC Count : 11.12 K/uL  RBC Count : 4.67 M/uL  Hemoglobin : 13.1 g/dL  Hematocrit : 40.6 %  Platelet Count - Automated : 301 K/uL  Mean Cell Volume : 86.9 fL  Mean Cell Hemoglobin : 28.1 pg  Mean Cell Hemoglobin Concentration : 32.3 %  Auto Neutrophil # : x  Auto Lymphocyte # : x  Auto Monocyte # : x  Auto Eosinophil # : x  Auto Basophil # : x  Auto Neutrophil % : x  Auto Lymphocyte % : x  Auto Monocyte % : x  Auto Eosinophil % : x  Auto Basophil % : x    09-03    141  |  101  |  16  ----------------------------<  237<H>  4.4   |  26  |  0.68    Ca    10.2      03 Sep 2020 06:03            MICROBIOLOGY:  .Tissue CLEAN MARGIN 2ND DIGIT LEFT FOOT  09-02-20   Testing in progress  --    No polymorphonuclear cells seen per low power field  No organisms seen per oil power field      .Other wound L 2nd toe  08-27-20   Numerous Staphylococcus aureus  Normal skin lois isolated  --  Staphylococcus aureus    COVID-19 PCR . (08.27.20 @ 16:56)    COVID-19 PCR: NotDetec: Testing is performed using polymerase chain reaction (PCR) or  transcription mediated amplification (TMA). This COVID-19 (SARS-CoV-2)  nucleic acid amplification test was validated by [employer] and is  in use under the FDA Emergency Use Authorization (EUA) for clinical labs  CLIA-certified to perform high complexity testing. Test results should be  correlated with clinical presentation, patient history, and epidemiology.        RADIOLOGY    < from: Xray Foot AP + Lateral + Oblique, Left (09.02.20 @ 14:36) >  Status post partial 2nd toe amputation through distal aspect of the proximal phalanx with postsurgical changes and residual bony spicule/fragments in the bed of resection.    No proximally tracking gas collections beyond the amputation margins and no focal areas of osteomyelitis.    Remainder of foot unchanged.    < end of copied text >

## 2020-09-03 NOTE — PROGRESS NOTE ADULT - ASSESSMENT
59 yr old F with Type 2 DM uncontrolled A1C 9.6% c/b neuropathy, PAD s/p PCI here with L 2nd OM.   8/30 - patient received Lantus 50 units at bedtime, hypoglycemia in AM 8/31, endocrine consulted  8/31 - patient received Lantus 18 units at bedtime, hypoglycemia at 3 AM 9/1    1. Type 2 diabetes mellitus with hyperglycemia, with long-term current use of insulin  -HbA1c 9.6%, goal less than 7%  -Inpatient BG target 100-180 mg/dl  -Noted that although patient home dose of Lantus is 36 units, she had hypoglycemia even with half of that dose (18 units)  -changed Lantus to 12 units SQ qHS for tonight.  As patients is now eating.  -C/w humalog correction scales for ac and for hs  - [facility]  - check fs ac and hs  -increased Humalog to 6 units SQ TID before meals (Hold if NPO/not eating meal)  -Consistent carbohydrate diet   -Humalog LOW dose scales before meals and bedtime  -FS AC and HS    Discharge Plan:  -Likely basal po/daughter will come to hospital to learn insulin to assist patient at home. Would resume lantus and metformin. Doses TBD. May consider adding dpp4 like tradjenta or januvia- whichever covered by insurance    -Please send scripts for glucometer, test strips, lancets, alcohol swabs, insulin pens and pen needles to patient's pharmacy prior to discharge.  -She can follow up at [facility] [place], [address] [facility], [place], [phone]     2. Hypertension, unspecified type  -BP goal <130/80.  -Mgmt per primary team     3. Hyperlipidemia, unspecified hyperlipidemia type  -LDL target less than 70  -Continue Atorvastatin 40 mg daily if no contraindications     Lorna Everett  Nurse Practitioner  Division of Endocrinology & Diabetes  Pager # [phone]      If after 6PM or before 9AM, or on weekends/holidays, please call endocrine answering service for assistance ([phone]).  For nonurgent matters email [email or web address] for assistance. 59 yr old F with Type 2 DM uncontrolled A1C 9.6% c/b neuropathy, PAD s/p PCI here with L 2nd OM.   8/30 - patient received Lantus 50 units at bedtime, hypoglycemia in AM 8/31, endocrine consulted  8/31 - patient received Lantus 18 units at bedtime, hypoglycemia at 3 AM 9/1    1. Type 2 diabetes mellitus with hyperglycemia, with long-term current use of insulin  -HbA1c 9.6%, goal less than 7%  -Inpatient BG target 100-180 mg/dl  -Noted that although patient home dose of Lantus is 36 units, she had hypoglycemia even with half of that dose (18 units)  -changed Lantus to 12 units SQ qHS for tonight.  As patients is now eating.  -C/w humalog correction scales for ac and for hs  - [facility]  - check fs ac and hs  -increased Humalog to 6 units SQ TID before meals (Hold if NPO/not eating meal)  -Consistent carbohydrate diet   -Humalog LOW dose scales before meals and bedtime  -FS AC and HS    Discharge Plan:  -Likely basal po/daughter will come to hospital to learn insulin to assist patient at home. Would resume lantus and metformin. Doses TBD. May consider adding dpp4 like tradjenta or januvia- whichever covered by insurance    -Please send scripts for glucometer, test strips, lancets, alcohol swabs, insulin pens and pen needles to patient's pharmacy prior to discharge.  -She can follow up at [facility] [place], [address] [facility], [place], [phone]     APPOINTMENT SCHEDULED FOR 12/8/2020 AT 0940H    2. Hypertension, unspecified type  -BP goal <130/80.  -Mgmt per primary team     3. Hyperlipidemia, unspecified hyperlipidemia type  -LDL target less than 70  -Continue Atorvastatin 40 mg daily if no contraindications     Lorna Everett  Nurse Practitioner  Division of Endocrinology & Diabetes  Pager # [phone]      If after 6PM or before 9AM, or on weekends/holidays, please call endocrine answering service for assistance ([phone]).  For nonurgent matters email [email or web address] for assistance.

## 2020-09-03 NOTE — PROGRESS NOTE ADULT - SUBJECTIVE AND OBJECTIVE BOX
Chief Complaint: DM 2    History: Patient seen at bedside. When offered , patient called her daughter, Juliana. Communicated with assistance from daughter, who reports speaking with herself, or with a  to [facility] is ok with them.  FS increasing today.  Patient learned insulin pens with transition of care pharmacist, needs assistance.  Daughter translated and reported pt is eating well.  No further hypoglycemia s/s    MEDICATIONS  (STANDING):  amLODIPine   Tablet 2.5 milliGRAM(s) Oral every 24 hours  aspirin  chewable 81 milliGRAM(s) Oral daily  atorvastatin 40 milliGRAM(s) Oral at bedtime  ceFAZolin   IVPB 1000 milliGRAM(s) IV Intermittent every 8 hours  clopidogrel Tablet 75 milliGRAM(s) Oral daily  collagenase Ointment 1 Application(s) Topical daily  dextrose 5%. 1000 milliLiter(s) (50 mL/Hr) IV Continuous <Continuous>  dextrose 50% Injectable 12.5 Gram(s) IV Push once  dextrose 50% Injectable 25 Gram(s) IV Push once  dextrose 50% Injectable 25 Gram(s) IV Push once  enoxaparin Injectable 40 milliGRAM(s) SubCutaneous daily  insulin lispro (HumaLOG) corrective regimen sliding scale   SubCutaneous three times a day before meals  insulin lispro Injectable (HumaLOG) 5 Unit(s) SubCutaneous three times a day before meals  losartan 100 milliGRAM(s) Oral every 24 hours    MEDICATIONS  (PRN):  acetaminophen   Tablet .. 650 milliGRAM(s) Oral every 6 hours PRN Severe Pain (7 - 10)  dextrose 40% Gel 15 Gram(s) Oral once PRN Blood Glucose LESS THAN 70 milliGRAM(s)/deciliter  dextrose 40% Gel 15 Gram(s) Oral once PRN Blood Glucose LESS THAN 70 milliGRAM(s)/deciliter  glucagon  Injectable 1 milliGRAM(s) IntraMuscular once PRN Glucose LESS THAN 70 milligrams/deciliter    No Known Allergies    Review of Systems:  HEENT: No pain  Cardiovascular: No chest pain  Respiratory: No SOB  GI: No nausea, vomiting    Vital Signs Last 24 Hrs  T(C): 37.3 (03 Sep 2020 13:30), Max: 37.3 (03 Sep 2020 13:30)  T(F): 99.1 (03 Sep 2020 13:30), Max: 99.1 (03 Sep 2020 13:30)  HR: 90 (03 Sep 2020 13:30) (78 - 102)  BP: 142/65 (03 Sep 2020 13:30) (142/65 - 183/84)  BP(mean): --  RR: 18 (03 Sep 2020 13:30) (16 - 18)  SpO2: 99% (03 Sep 2020 13:30) (99% - 100%)  GENERAL: NAD  EYES: No proptosis, no lid lag, anicteric  HEENT:  Atraumatic, Normocephalic, moist mucous membranes  RESPIRATORY: unlabored respirations     CAPILLARY BLOOD GLUCOSE  POCT Blood Glucose.: 251 mg/dL (03 Sep 2020 12:10)  POCT Blood Glucose.: 216 mg/dL (03 Sep 2020 08:32)  POCT Blood Glucose.: 224 mg/dL (03 Sep 2020 04:32)  POCT Blood Glucose.: 315 mg/dL (02 Sep 2020 22:11)  POCT Blood Glucose.: 156 mg/dL (02 Sep 2020 17:04)    POCT Blood Glucose.: 328 mg/dL (01 Sep 2020 16:46)  POCT Blood Glucose.: 239 mg/dL (01 Sep 2020 12:07)  POCT Blood Glucose.: 91 mg/dL (01 Sep 2020 08:17)  POCT Blood Glucose.: 94 mg/dL (01 Sep 2020 05:46)  POCT Blood Glucose.: 110 mg/dL (01 Sep 2020 03:50)  POCT Blood Glucose.: 101 mg/dL (01 Sep 2020 03:49)  POCT Blood Glucose.: 39 mg/dL (01 Sep 2020 03:28)  POCT Blood Glucose.: 40 mg/dL (01 Sep 2020 03:28)  POCT Blood Glucose.: 116 mg/dL (31 Aug 2020 22:14)    09-03    141  |  101  |  16  ----------------------------<  237<H>  4.4   |  26  |  0.68    Ca    10.2      03 Sep 2020 06:03        Thyroid Function Tests:  08-27 @ 15:40 TSH 3.99 FreeT4 -- T3 -- Anti TPO -- Anti Thyroglobulin Ab -- TSI --      A1C with Estimated Average Glucose: 9.6 % (08-28-20 @ 06:38)  A1C with Estimated Average Glucose: 9.5 % (08-27-20 @ 15:16)

## 2020-09-04 ENCOUNTER — TRANSCRIPTION ENCOUNTER (OUTPATIENT)
Age: 60
End: 2020-09-04

## 2020-09-04 VITALS
HEART RATE: 86 BPM | SYSTOLIC BLOOD PRESSURE: 156 MMHG | RESPIRATION RATE: 18 BRPM | TEMPERATURE: 100 F | DIASTOLIC BLOOD PRESSURE: 76 MMHG | OXYGEN SATURATION: 100 %

## 2020-09-04 PROBLEM — E78.5 HYPERLIPIDEMIA, UNSPECIFIED: Chronic | Status: ACTIVE | Noted: 2020-08-27

## 2020-09-04 PROBLEM — E11.9 TYPE 2 DIABETES MELLITUS WITHOUT COMPLICATIONS: Chronic | Status: ACTIVE | Noted: 2020-08-27

## 2020-09-04 PROBLEM — I10 ESSENTIAL (PRIMARY) HYPERTENSION: Chronic | Status: ACTIVE | Noted: 2020-08-27

## 2020-09-04 PROBLEM — I73.9 PERIPHERAL VASCULAR DISEASE, UNSPECIFIED: Chronic | Status: ACTIVE | Noted: 2020-08-27

## 2020-09-04 LAB
BASOPHILS # BLD AUTO: 0.08 K/UL — SIGNIFICANT CHANGE UP (ref 0–0.2)
BASOPHILS NFR BLD AUTO: 0.9 % — SIGNIFICANT CHANGE UP (ref 0–2)
BASOPHILS NFR SPEC: 2 % — SIGNIFICANT CHANGE UP (ref 0–2)
EOSINOPHIL # BLD AUTO: 0.23 K/UL — SIGNIFICANT CHANGE UP (ref 0–0.5)
EOSINOPHIL NFR BLD AUTO: 2.6 % — SIGNIFICANT CHANGE UP (ref 0–6)
EOSINOPHIL NFR FLD: 2 % — SIGNIFICANT CHANGE UP (ref 0–6)
GLUCOSE BLDC GLUCOMTR-MCNC: 176 MG/DL — HIGH (ref 70–99)
GLUCOSE BLDC GLUCOMTR-MCNC: 198 MG/DL — HIGH (ref 70–99)
HCT VFR BLD CALC: 33.8 % — LOW (ref 34.5–45)
HGB BLD-MCNC: 11.1 G/DL — LOW (ref 11.5–15.5)
IMM GRANULOCYTES NFR BLD AUTO: 0.3 % — SIGNIFICANT CHANGE UP (ref 0–1.5)
LYMPHOCYTES # BLD AUTO: 4.36 K/UL — HIGH (ref 1–3.3)
LYMPHOCYTES # BLD AUTO: 50 % — HIGH (ref 13–44)
LYMPHOCYTES NFR SPEC AUTO: 37 % — SIGNIFICANT CHANGE UP (ref 13–44)
MANUAL SMEAR VERIFICATION: SIGNIFICANT CHANGE UP
MCHC RBC-ENTMCNC: 28.2 PG — SIGNIFICANT CHANGE UP (ref 27–34)
MCHC RBC-ENTMCNC: 32.8 % — SIGNIFICANT CHANGE UP (ref 32–36)
MCV RBC AUTO: 86 FL — SIGNIFICANT CHANGE UP (ref 80–100)
MICROCYTES BLD QL: SLIGHT — SIGNIFICANT CHANGE UP
MONOCYTES # BLD AUTO: 0.53 K/UL — SIGNIFICANT CHANGE UP (ref 0–0.9)
MONOCYTES NFR BLD AUTO: 6.1 % — SIGNIFICANT CHANGE UP (ref 2–14)
MONOCYTES NFR BLD: 9 % — SIGNIFICANT CHANGE UP (ref 2–9)
NEUTROPHIL AB SER-ACNC: 46 % — SIGNIFICANT CHANGE UP (ref 43–77)
NEUTROPHILS # BLD AUTO: 3.49 K/UL — SIGNIFICANT CHANGE UP (ref 1.8–7.4)
NEUTROPHILS NFR BLD AUTO: 40.1 % — LOW (ref 43–77)
NRBC # BLD: 0 /100WBC — SIGNIFICANT CHANGE UP
NRBC # FLD: 0 K/UL — SIGNIFICANT CHANGE UP (ref 0–0)
PLATELET # BLD AUTO: 279 K/UL — SIGNIFICANT CHANGE UP (ref 150–400)
PLATELET CLUMP BLD QL SMEAR: SIGNIFICANT CHANGE UP
PLATELET COUNT - ESTIMATE: NORMAL — SIGNIFICANT CHANGE UP
PMV BLD: 10.6 FL — SIGNIFICANT CHANGE UP (ref 7–13)
RBC # BLD: 3.93 M/UL — SIGNIFICANT CHANGE UP (ref 3.8–5.2)
RBC # FLD: 13.2 % — SIGNIFICANT CHANGE UP (ref 10.3–14.5)
VARIANT LYMPHS # BLD: 4 % — SIGNIFICANT CHANGE UP
WBC # BLD: 8.72 K/UL — SIGNIFICANT CHANGE UP (ref 3.8–10.5)
WBC # FLD AUTO: 8.72 K/UL — SIGNIFICANT CHANGE UP (ref 3.8–10.5)

## 2020-09-04 PROCEDURE — 99239 HOSP IP/OBS DSCHRG MGMT >30: CPT

## 2020-09-04 PROCEDURE — 99232 SBSQ HOSP IP/OBS MODERATE 35: CPT

## 2020-09-04 RX ORDER — ACETAMINOPHEN 500 MG
2 TABLET ORAL
Qty: 0 | Refills: 0 | DISCHARGE
Start: 2020-09-04

## 2020-09-04 RX ORDER — OXYCODONE HYDROCHLORIDE 5 MG/1
1 TABLET ORAL
Qty: 9 | Refills: 0
Start: 2020-09-04 | End: 2020-09-06

## 2020-09-04 RX ORDER — LINAGLIPTIN 5 MG/1
1 TABLET, FILM COATED ORAL
Qty: 90 | Refills: 0
Start: 2020-09-04 | End: 2020-12-02

## 2020-09-04 RX ORDER — ISOPROPYL ALCOHOL, BENZOCAINE .7; .06 ML/ML; ML/ML
1 SWAB TOPICAL
Qty: 200 | Refills: 2
Start: 2020-09-04

## 2020-09-04 RX ORDER — CEPHALEXIN 500 MG
1 CAPSULE ORAL
Qty: 20 | Refills: 0
Start: 2020-09-04 | End: 2020-09-08

## 2020-09-04 RX ORDER — INSULIN GLARGINE 100 [IU]/ML
100 INJECTION, SOLUTION SUBCUTANEOUS
Qty: 0 | Refills: 0 | DISCHARGE

## 2020-09-04 RX ORDER — AMLODIPINE BESYLATE 2.5 MG/1
1 TABLET ORAL
Qty: 0 | Refills: 0 | DISCHARGE

## 2020-09-04 RX ORDER — AMLODIPINE BESYLATE 2.5 MG/1
1 TABLET ORAL
Qty: 90 | Refills: 0
Start: 2020-09-04 | End: 2020-12-02

## 2020-09-04 RX ADMIN — Medication 100 MILLIGRAM(S): at 05:21

## 2020-09-04 RX ADMIN — Medication 650 MILLIGRAM(S): at 12:56

## 2020-09-04 RX ADMIN — Medication 81 MILLIGRAM(S): at 13:03

## 2020-09-04 RX ADMIN — Medication 1 APPLICATION(S): at 13:04

## 2020-09-04 RX ADMIN — Medication 100 MILLIGRAM(S): at 13:04

## 2020-09-04 RX ADMIN — Medication 1: at 08:49

## 2020-09-04 RX ADMIN — ENOXAPARIN SODIUM 40 MILLIGRAM(S): 100 INJECTION SUBCUTANEOUS at 13:04

## 2020-09-04 RX ADMIN — Medication 650 MILLIGRAM(S): at 11:56

## 2020-09-04 RX ADMIN — Medication 6 UNIT(S): at 13:02

## 2020-09-04 RX ADMIN — CLOPIDOGREL BISULFATE 75 MILLIGRAM(S): 75 TABLET, FILM COATED ORAL at 13:04

## 2020-09-04 RX ADMIN — Medication 1: at 13:02

## 2020-09-04 RX ADMIN — AMLODIPINE BESYLATE 5 MILLIGRAM(S): 2.5 TABLET ORAL at 05:22

## 2020-09-04 RX ADMIN — Medication 6 UNIT(S): at 08:50

## 2020-09-04 NOTE — PROGRESS NOTE ADULT - RS GEN PE MLT RESP DETAILS PC
respirations non-labored/clear to auscultation bilaterally/no wheezes
clear to auscultation bilaterally/no wheezes/respirations non-labored
no wheezes/clear to auscultation bilaterally/respirations non-labored

## 2020-09-04 NOTE — PROGRESS NOTE ADULT - ASSESSMENT
58 YO F with hx of HTN, DMT2, uncontrolled, A1c 9.6. Hx of PAD with 2 stents. Now presenting with dry gangrenous ulcer of 2nd L toe. No associated swelling. Xray of the foot with Possible Osteomyelitis. Wound Cx + S. Aureus skin lois.       Toe OM, diabetic toe ulcer, MSSA infection  - keflex 500 mg po 4x daily x 5 days   - OR findings -low concern for residual infection  - DM control   - local care per podiatry

## 2020-09-04 NOTE — DISCHARGE NOTE NURSING/CASE MANAGEMENT/SOCIAL WORK - PATIENT PORTAL LINK FT
You can access the FollowMyHealth Patient Portal offered by [facility] by registering at the following website: [email or web address]. By joining [employer]’s FollowMyHealth portal, you will also be able to view your health information using other applications (apps) compatible with our system.

## 2020-09-04 NOTE — PHARMACOTHERAPY INTERVENTION NOTE - COMMENTS
With ACP PA, spoke over the phone with patient's daughter Juliana. Told her the final Lantus dose 12 units qHS (she will help her mother with the insulin - was taught yesterday), metformin 1 G BID with breakfast and dinner, as well as the new medication Tradjenta 5 mG once daily. They have testing supplies at home (which can only be filled at through [facility] insurance?) - pt's daughter requests the prescriptions be printed so the daughter can send to this service. She is aware of follow-up appointment with endocrine and will follow-up with PCP in the meantime. With ACP PA, patient prefers us to communicate with daughter Juliana- spoke over the phone with patient's daughter. Told her the final Lantus dose 12 units qHS (she will help her mother with the insulin - was taught yesterday), metformin 1 G BID with breakfast and dinner, as well as the new medication Tradjenta 5 mG once daily. They have testing supplies at home (which can only be filled at through [facility] insurance?) - pt's daughter requests the prescriptions be printed so the daughter can send to this service. She is aware of follow-up appointment with endocrine and will follow-up with PCP in the meantime.

## 2020-09-04 NOTE — PROGRESS NOTE ADULT - SUBJECTIVE AND OBJECTIVE BOX
Podiatry pager #: [phone] ([place])/ [phone] ([facility])    Patient is a 59y old  Female who presents with a chief complaint of Left 2nd toe gangrenous necrotic ulcer (01 Sep 2020 12:47)       INTERVAL HPI/OVERNIGHT EVENTS:  Patient seen and evaluated at bedside.  Pt is resting comfortable in NAD. Denies N/V/F/C.     Allergies    No Known Allergies    Intolerances        Vital Signs Last 24 Hrs  T(C): 36.9 (04 Sep 2020 05:16), Max: 37.3 (03 Sep 2020 13:30)  T(F): 98.4 (04 Sep 2020 05:16), Max: 99.1 (03 Sep 2020 13:30)  HR: 86 (04 Sep 2020 05:16) (86 - 90)  BP: 138/85 (04 Sep 2020 05:16) (138/85 - 149/91)  BP(mean): --  RR: 18 (04 Sep 2020 05:16) (18 - 18)  SpO2: 99% (04 Sep 2020 05:16) (98% - 99%)    LABS:                        11.1   8.72  )-----------( 279      ( 04 Sep 2020 05:30 )             33.8     09-03    141  |  101  |  16  ----------------------------<  237<H>  4.4   |  26  |  0.68    Ca    10.2      03 Sep 2020 06:03          CAPILLARY BLOOD GLUCOSE      POCT Blood Glucose.: 253 mg/dL (03 Sep 2020 22:12)  POCT Blood Glucose.: 192 mg/dL (03 Sep 2020 17:07)  POCT Blood Glucose.: 251 mg/dL (03 Sep 2020 12:10)  POCT Blood Glucose.: 216 mg/dL (03 Sep 2020 08:32)      Lower Extremity Physical Exam:    Vascular: DP 1/4, PT NP, B/L,  Neuro: Epicritic sensation intact to the level of the digits b/l   MSk: unremarkable  s/p LF 2nd digit partial amputation (DOS 9/2/20), surgical site well coapted, warm and viable, sutures intact, no dehiscence, no drainage, no hematoma, no signs of infection.     RADIOLOGY & ADDITIONAL TESTS:  < from: Xray Foot AP + Lateral + Oblique, Left (09.02.20 @ 14:36) >    EXAM:  RAD FOOT MIN 3 VIEWS LT        PROCEDURE DATE:  Sep  2 2020         INTERPRETATION:  CLINICAL INDICATION: status post left foot partial 2nd toe amputation    EXAM:  Frontal, oblique, and lateral left foot from 9/2/2020 at 1436. Compared to preoperative images from 8/27/2020.    IMPRESSION:  Status post partial 2nd toe amputation through distal aspect of the proximal phalanx with postsurgical changes and residual bony spicule/fragments in the bed of resection.    No proximally tracking gas collections beyond the amputation margins and no focal areas of osteomyelitis.    Remainder of foot unchanged.    Also correlate with intraoperative findings.                BOBBY OLIVARES M.D., ATTENDING RADIOLOGIST  This document has been electronically signed. Sep  2 2020  4:48PM                  < end of copied text >

## 2020-09-04 NOTE — PROGRESS NOTE ADULT - PROBLEM SELECTOR PLAN 3
Hx of DM. A1C 9.6, indicating poor glycemic control.  hypoglycemia  resolved    Pt NOT taking Lantus 100U Qhs and metformin 1000mg BID at home at home. Pt taking only Lantus 34-35 units at night and metformin in the morning as confirmed by pt and her daughter. Pt was also prescribed Novolog 14 units TID but not taking. Endo following case, recs appreciated  - decrease Lantus 12 U qhs units; d/c Humalog 6  units  AC --> metformin 1g BID with breakfast and dinner, as well as the new medication Tradjenta 5 mG once daily upon discharge  - c/w  low corrective insulin sliding scale while inpatient  - diabetes education

## 2020-09-04 NOTE — PROGRESS NOTE ADULT - GASTROINTESTINAL DETAILS
no rigidity/nontender/no distention/soft/no guarding
no guarding/no rigidity/nontender/soft/no distention
no rigidity/nontender/no guarding/soft/no distention

## 2020-09-04 NOTE — PROGRESS NOTE ADULT - ASSESSMENT
60 yo f s/p LF 2nd partial toe amputation    -pt was seen and evaluated   - surgical site well coapted, warm and viable, sutures intact, no dehiscence, no drainage, no hematoma, no signs of infection.   - LF Xray: consistent w surgery  - pod stable for discharge follow within 1 week with Dr. Stauffer info in discharge paperwork  - Discussed w/ attending

## 2020-09-04 NOTE — PROGRESS NOTE ADULT - SUBJECTIVE AND OBJECTIVE BOX
Patient is a 59y old  Female who presents with a chief complaint of Left 2nd toe gangrenous necrotic ulcer (01 Sep 2020 12:47)    SUBJECTIVE / OVERNIGHT EVENTS:  Patient with pain at surgical site of left foot, exacerbated when she tries to walk. Tylenol does not help to adequately control her pain. She has no fever, chills, chest pain, SOB, n/v or abdominal. No focal weakness.    MEDICATIONS  (STANDING):  amLODIPine   Tablet 5 milliGRAM(s) Oral daily  aspirin  chewable 81 milliGRAM(s) Oral daily  atorvastatin 40 milliGRAM(s) Oral at bedtime  ceFAZolin   IVPB 1000 milliGRAM(s) IV Intermittent every 8 hours  clopidogrel Tablet 75 milliGRAM(s) Oral daily  collagenase Ointment 1 Application(s) Topical daily  dextrose 5%. 1000 milliLiter(s) (50 mL/Hr) IV Continuous <Continuous>  dextrose 50% Injectable 12.5 Gram(s) IV Push once  dextrose 50% Injectable 25 Gram(s) IV Push once  dextrose 50% Injectable 25 Gram(s) IV Push once  enoxaparin Injectable 40 milliGRAM(s) SubCutaneous daily  insulin glargine Injectable (LANTUS) 12 Unit(s) SubCutaneous at bedtime  insulin lispro (HumaLOG) corrective regimen sliding scale   SubCutaneous three times a day before meals  insulin lispro Injectable (HumaLOG) 6 Unit(s) SubCutaneous three times a day before meals  losartan 100 milliGRAM(s) Oral every 24 hours  melatonin 3 milliGRAM(s) Oral at bedtime    MEDICATIONS  (PRN):  acetaminophen   Tablet .. 650 milliGRAM(s) Oral every 6 hours PRN Mild Pain (1 - 3)  acetaminophen   Tablet .. 650 milliGRAM(s) Oral every 6 hours PRN Severe Pain (7 - 10)  dextrose 40% Gel 15 Gram(s) Oral once PRN Blood Glucose LESS THAN 70 milliGRAM(s)/deciliter  dextrose 40% Gel 15 Gram(s) Oral once PRN Blood Glucose LESS THAN 70 milliGRAM(s)/deciliter  glucagon  Injectable 1 milliGRAM(s) IntraMuscular once PRN Glucose LESS THAN 70 milligrams/deciliter  oxycodone    5 mG/acetaminophen 325 mG 1 Tablet(s) Oral every 4 hours PRN Moderate Pain (4 - 6)      Vital Signs Last 24 Hrs  T(C): 37.6 (04 Sep 2020 12:26), Max: 37.6 (04 Sep 2020 12:26)  T(F): 99.7 (04 Sep 2020 12:26), Max: 99.7 (04 Sep 2020 12:26)  HR: 86 (04 Sep 2020 12:26) (86 - 90)  BP: 156/76 (04 Sep 2020 12:26) (138/85 - 156/76)  BP(mean): --  RR: 18 (04 Sep 2020 12:26) (18 - 18)  SpO2: 100% (04 Sep 2020 12:26) (98% - 100%)  CAPILLARY BLOOD GLUCOSE      POCT Blood Glucose.: 198 mg/dL (04 Sep 2020 12:08)  POCT Blood Glucose.: 176 mg/dL (04 Sep 2020 08:13)  POCT Blood Glucose.: 253 mg/dL (03 Sep 2020 22:12)  POCT Blood Glucose.: 192 mg/dL (03 Sep 2020 17:07)            PHYSICAL EXAM  GENERAL: NAD, overweight, non-toxic appearing  CHEST/LUNG: Clear to auscultation bilaterally; No wheeze  HEART: Regular rate and rhythm  ABDOMEN: Soft, Nontender, Nondistended; Bowel sounds present  EXTREMITIES:  No clubbing, cyanosis, or edema. s/p left 2nd toe amputation with Left foot  wrapped in dressing, c/d/i.  PSYCH: AAOx3, cooperative, calm         LABS:                        11.1   8.72  )-----------( 279      ( 04 Sep 2020 05:30 )             33.8     09-03    141  |  101  |  16  ----------------------------<  237<H>  4.4   |  26  |  0.68    Ca    10.2      03 Sep 2020 06:03        Consultant(s) Notes Reviewed:  yes  Care Discussed with Consultants/Other Providers:

## 2020-09-04 NOTE — PROGRESS NOTE ADULT - NEUROLOGICAL DETAILS
responds to verbal commands/alert and oriented x 3
alert and oriented x 3/responds to verbal commands
responds to verbal commands

## 2020-09-04 NOTE — PROGRESS NOTE ADULT - SUBJECTIVE AND OBJECTIVE BOX
S: no chest pain or shortness of breath.   Review of systems otherwise (-)  	  acetaminophen   Tablet .. 650 milliGRAM(s) Oral every 6 hours PRN  acetaminophen   Tablet .. 650 milliGRAM(s) Oral every 6 hours PRN  amLODIPine   Tablet 5 milliGRAM(s) Oral daily  aspirin  chewable 81 milliGRAM(s) Oral daily  atorvastatin 40 milliGRAM(s) Oral at bedtime  ceFAZolin   IVPB 1000 milliGRAM(s) IV Intermittent every 8 hours  clopidogrel Tablet 75 milliGRAM(s) Oral daily  collagenase Ointment 1 Application(s) Topical daily  dextrose 40% Gel 15 Gram(s) Oral once PRN  dextrose 40% Gel 15 Gram(s) Oral once PRN  dextrose 5%. 1000 milliLiter(s) IV Continuous <Continuous>  dextrose 50% Injectable 12.5 Gram(s) IV Push once  dextrose 50% Injectable 25 Gram(s) IV Push once  dextrose 50% Injectable 25 Gram(s) IV Push once  enoxaparin Injectable 40 milliGRAM(s) SubCutaneous daily  glucagon  Injectable 1 milliGRAM(s) IntraMuscular once PRN  insulin glargine Injectable (LANTUS) 12 Unit(s) SubCutaneous at bedtime  insulin lispro (HumaLOG) corrective regimen sliding scale   SubCutaneous three times a day before meals  insulin lispro Injectable (HumaLOG) 6 Unit(s) SubCutaneous three times a day before meals  losartan 100 milliGRAM(s) Oral every 24 hours  melatonin 3 milliGRAM(s) Oral at bedtime  oxycodone    5 mG/acetaminophen 325 mG 1 Tablet(s) Oral every 4 hours PRN                            11.1   8.72  )-----------( 279      ( 04 Sep 2020 05:30 )             33.8       09-03    141  |  101  |  16  ----------------------------<  237<H>  4.4   |  26  |  0.68    Ca    10.2      03 Sep 2020 06:03              T(C): 37.6 (09-04-20 @ 12:26), Max: 37.6 (09-04-20 @ 12:26)  HR: 86 (09-04-20 @ 12:26) (86 - 90)  BP: 156/76 (09-04-20 @ 12:26) (138/85 - 156/76)  RR: 18 (09-04-20 @ 12:26) (18 - 18)  SpO2: 100% (09-04-20 @ 12:26) (98% - 100%)  Wt(kg): --    I&O's Summary      Gen: Appears well in NAD  HEENT:  (-)icterus (-)pallor  CV: N S1 S2 1/6 RAY (+)2 Pulses B/l  Resp:  Clear to ausculatation B/L, normal effort  GI: (+) BS Soft, NT, ND  Lymph:  (-)Edema, (-)obvious lymphadenopathy  Skin: Warm to touch, Normal turgor  Psych: Appropriate mood and affect    ASSESSMENT/PLAN: 	    Patient is a 60 y/o Telugu speaking female known to our office with PMH of HTN, HLD, DM2, PAD s/p 2 stents in left leg (Angioplasty performed with Dr. Baker in [facility] in April 2020) with left 2nd toe ulcer who presents for worsening left 2nd toe pain. Cardiology consulted for preop clearance. Patient was previously seen in our office in 2018 and had a normal exercise NST with no evidence of stress induced ischemia or infarct as well as TTE with normal LV/RV function.    - s/p L 2nd toe amp   - tolerated procedure well from CV perspective  - TTE with normal LV/RV function  - No further inpatient cardiac w/u needed  - Follow up vascular and podiatry    Melissa Zuniga MD, FACC

## 2020-09-04 NOTE — PROGRESS NOTE ADULT - PROBLEM SELECTOR PROBLEM 1
Toe infection
Type 2 diabetes mellitus with hyperglycemia, with long-term current use of insulin
Type 2 diabetes mellitus with hyperglycemia, with long-term current use of insulin
Toe infection
Toe infection

## 2020-09-04 NOTE — PROGRESS NOTE ADULT - NEGATIVE GASTROINTESTINAL SYMPTOMS
no diarrhea/no abdominal pain/no vomiting
no vomiting/no abdominal pain/no diarrhea
no vomiting/no abdominal pain/no diarrhea

## 2020-09-04 NOTE — PROGRESS NOTE ADULT - PROBLEM SELECTOR PLAN 1
Pt w/ L 2nd toe foot ulcer/gangrene. Afebrile. No leukocytosis. CRP negative. ESR 34. Xray with 2nd toe soft tissue swelling with focal ulceration at tip. In addition, eroded and indistinct underlying 2nd distal phalangeal tuft cortical margins consistent with radiographic changes of osteomyelitis. No tracking gas collections beyond this region and no additional focal areas of osteomyelitis.   Podiatry following the case  - s/p left foot partial 2nd ray resection on 9/2    -  was on  vanc/cefepime --> Abx changed to Ancef as MSSA. ID following, low concern for residual infection; agree to d/c cefazolin and transition to Po keflex 500mg 4x daily x5 days.   - f/u OR surgical path report, currently in lab  - pain control with PRN Percocet 5/325mg TID

## 2020-09-04 NOTE — PROGRESS NOTE ADULT - PROVIDER SPECIALTY LIST ADULT
Anesthesia
Cardiology
Endocrinology
Endocrinology
Hospitalist
Infectious Disease
Podiatry
Vascular Surgery
Infectious Disease
Hospitalist
Hospitalist

## 2020-09-04 NOTE — PROGRESS NOTE ADULT - SUBJECTIVE AND OBJECTIVE BOX
NESTOR VARGAS 59y [id number]    Patient is a 59y old  Female who presents with a chief complaint of Left 2nd toe gangrenous necrotic ulcer (01 Sep 2020 12:47)      Follow Up/CC:  ID following for toe ulcer    Interval History/ROS: no fever, some pain in left foot     Allergies    No Known Allergies    Intolerances        ANTIMICROBIALS:  ceFAZolin   IVPB 1000 every 8 hours      MEDICATIONS  (STANDING):  amLODIPine   Tablet 5 milliGRAM(s) Oral daily  aspirin  chewable 81 milliGRAM(s) Oral daily  atorvastatin 40 milliGRAM(s) Oral at bedtime  ceFAZolin   IVPB 1000 milliGRAM(s) IV Intermittent every 8 hours  clopidogrel Tablet 75 milliGRAM(s) Oral daily  collagenase Ointment 1 Application(s) Topical daily  dextrose 5%. 1000 milliLiter(s) (50 mL/Hr) IV Continuous <Continuous>  dextrose 50% Injectable 12.5 Gram(s) IV Push once  dextrose 50% Injectable 25 Gram(s) IV Push once  dextrose 50% Injectable 25 Gram(s) IV Push once  enoxaparin Injectable 40 milliGRAM(s) SubCutaneous daily  insulin glargine Injectable (LANTUS) 12 Unit(s) SubCutaneous at bedtime  insulin lispro (HumaLOG) corrective regimen sliding scale   SubCutaneous three times a day before meals  insulin lispro Injectable (HumaLOG) 6 Unit(s) SubCutaneous three times a day before meals  losartan 100 milliGRAM(s) Oral every 24 hours  melatonin 3 milliGRAM(s) Oral at bedtime    MEDICATIONS  (PRN):  acetaminophen   Tablet .. 650 milliGRAM(s) Oral every 6 hours PRN Mild Pain (1 - 3)  acetaminophen   Tablet .. 650 milliGRAM(s) Oral every 6 hours PRN Severe Pain (7 - 10)  dextrose 40% Gel 15 Gram(s) Oral once PRN Blood Glucose LESS THAN 70 milliGRAM(s)/deciliter  dextrose 40% Gel 15 Gram(s) Oral once PRN Blood Glucose LESS THAN 70 milliGRAM(s)/deciliter  glucagon  Injectable 1 milliGRAM(s) IntraMuscular once PRN Glucose LESS THAN 70 milligrams/deciliter  oxycodone    5 mG/acetaminophen 325 mG 1 Tablet(s) Oral every 4 hours PRN Moderate Pain (4 - 6)        Vital Signs Last 24 Hrs  T(C): 36.9 (04 Sep 2020 05:16), Max: 37.3 (03 Sep 2020 13:30)  T(F): 98.4 (04 Sep 2020 05:16), Max: 99.1 (03 Sep 2020 13:30)  HR: 86 (04 Sep 2020 05:16) (86 - 90)  BP: 138/85 (04 Sep 2020 05:16) (138/85 - 149/91)  BP(mean): --  RR: 18 (04 Sep 2020 05:16) (18 - 18)  SpO2: 99% (04 Sep 2020 05:16) (98% - 99%)    CBC Full  -  ( 04 Sep 2020 05:30 )  WBC Count : 8.72 K/uL  RBC Count : 3.93 M/uL  Hemoglobin : 11.1 g/dL  Hematocrit : 33.8 %  Platelet Count - Automated : 279 K/uL  Mean Cell Volume : 86.0 fL  Mean Cell Hemoglobin : 28.2 pg  Mean Cell Hemoglobin Concentration : 32.8 %  Auto Neutrophil # : 3.49 K/uL  Auto Lymphocyte # : 4.36 K/uL  Auto Monocyte # : 0.53 K/uL  Auto Eosinophil # : 0.23 K/uL  Auto Basophil # : 0.08 K/uL  Auto Neutrophil % : 40.1 %  Auto Lymphocyte % : 50.0 %  Auto Monocyte % : 6.1 %  Auto Eosinophil % : 2.6 %  Auto Basophil % : 0.9 %    09-03    141  |  101  |  16  ----------------------------<  237<H>  4.4   |  26  |  0.68    Ca    10.2      03 Sep 2020 06:03            MICROBIOLOGY:  .Tissue CLEAN MARGIN 2ND DIGIT LEFT FOOT  09-02-20   No growth  --    No polymorphonuclear cells seen per low power field  No organisms seen per oil power field      .Other wound L 2nd toe  08-27-20   Numerous Staphylococcus aureus  Normal skin lois isolated  --  Staphylococcus aureus          RADIOLOGY    < from: Xray Foot AP + Lateral + Oblique, Left (09.02.20 @ 14:36) >  Status post partial 2nd toe amputation through distal aspect of the proximal phalanx with postsurgical changes and residual bony spicule/fragments in the bed of resection.    No proximally tracking gas collections beyond the amputation margins and no focal areas of osteomyelitis.    Remainder of foot unchanged.    Also correlate with intraoperative findings.    < end of copied text >

## 2020-09-04 NOTE — PROGRESS NOTE ADULT - PROBLEM SELECTOR PLAN 2
Hx of PAD s/p 2 stents   - C/w ASA and plavix   - JONAH/PVR noted,  - arterial duplex of LE b/l noted as above Vascular f/u noted, As per vascular adequate bleeding during podiatry case and Outpatient angio records show adequate blood flow to extremity   - Vascular recommend outpt f/u with her vascular surgeon or DR Forman as per her preferences

## 2020-09-04 NOTE — PROGRESS NOTE ADULT - ATTENDING COMMENTS
Agree with above assessment and plan as outlined above.    - Low risk for proposed podiatric procedure    Tyrese Tierney MD, [facility]  BEEPER [phone]
CARDIOLOGY ATTENDING    Patient seen and examined. Agree with above. No further inpatient cardiac workup needed.
Agree with above  No further cardiac workup needed prior to podiatry/vascular procedures    Melissa Zuniga MD
I have discussed the case with the surgical house staff. I have personally seen, examined, and participated in the care of this patient. I have reviewed all pertinent clinical information.    Patient with left second toe wound. she is s/p left leg angiogram with intervention.  On exam, left foot with second toe gangrene with bone exposed. foot warm. doppler signals. cap refill 2 sec    JONAH/PVR reviewed.  Left TBI is 0.63 with toe pressure 55. Blunted waveforms seen on PVRs at level of metatarsals.     Recommendations:  - TBI and toe pressures indicate patient likely to heal ray amputation. Will follow closely for any signs of wound breakdown or nonhealing.  If any concern for healing post amputation, patient will need repeat angiogram with possible intervention.  - Please obtain records (operative report for angiogram) from [facility] as this will help determine if additional intervention is needed at this time.   - Arterial duplex.
I have discussed the case with the surgical house staff. I have personally seen, examined, and participated in the care of this patient. I have reviewed all pertinent clinical information.    Patient with left second toe wound. she is s/p left leg angiogram with intervention.  On exam, left foot with second toe gangrene with bone exposed. foot warm. doppler signals. cap refill 2 sec    JONAH/PVR reviewed.  Left TBI is 0.63 with toe pressure 55. Blunted waveforms seen on PVRs at level of metatarsals.     Recommendations:  - TBI and toe pressures indicate patient likely to heal ray amputation. Will follow closely for any signs of wound breakdown or nonhealing.  If any concern for healing post amputation, patient will need repeat angiogram with possible intervention.  - Please obtain records (operative report for angiogram) from [facility] as this will help determine if additional intervention is needed at this time.   - Arterial duplex.
Patient seen and examined.  Agree with above.   No further inpatient cardiac workup needed at this time.   Follow up podiatry and vascular    Melissa Zuniga MD, FACC
Toe OM, diabetic toe ulcer, MSSA infection  -for amputation tomorrow  -hope to stop abx postop  -dc cefepime and vancomycin  -Ancef 1 gm iv q8    Jim Middleton  Attending Physician   Division of Infectious Disease  Pager #[phone]  After 5pm/weekend or no response, call #[phone]
plan of care d/w daughter, all questions answered
Jim Middleton  Attending Physician   Division of Infectious Disease  Pager #[phone]  After 5pm/weekend or no response, call #[phone]
Jim Middleton  Attending Physician   Division of Infectious Disease  Pager #[phone]  After 5pm/weekend or no response, call #[phone]    Will sign off, recall ID if needed #[phone].
plan of care d/w daughter on the phone at patient's bedside , all questions answered
plan of care d/w daughter on the phone at patient's bedside , all questions answered
plan of care d/w daughter, all questions answered
Anticipate discharge in 24-48 hrs pending tissue cx   plan of care d/w daughter on the phone
Jim Middleton  Attending Physician   Division of Infectious Disease  Pager #[phone]  After 5pm/weekend or no response, call #[phone]

## 2020-09-04 NOTE — PROGRESS NOTE ADULT - NSHPATTENDINGPLANDISCUSS_GEN_ALL_CORE
patient and ACP
ADS NP (Diamante)
patient and ACP
patient and ACP
ADS NP (Marlen)

## 2020-09-05 LAB
-  AMPICILLIN/SULBACTAM: SIGNIFICANT CHANGE UP
-  CEFAZOLIN: SIGNIFICANT CHANGE UP
-  CLINDAMYCIN: SIGNIFICANT CHANGE UP
-  ERYTHROMYCIN: SIGNIFICANT CHANGE UP
-  GENTAMICIN: SIGNIFICANT CHANGE UP
-  OXACILLIN: SIGNIFICANT CHANGE UP
-  PENICILLIN: SIGNIFICANT CHANGE UP
-  RIFAMPIN: SIGNIFICANT CHANGE UP
-  TETRACYCLINE: SIGNIFICANT CHANGE UP
-  TRIMETHOPRIM/SULFAMETHOXAZOLE: SIGNIFICANT CHANGE UP
-  VANCOMYCIN: SIGNIFICANT CHANGE UP
METHOD TYPE: SIGNIFICANT CHANGE UP

## 2020-09-07 LAB
CULTURE RESULTS: SIGNIFICANT CHANGE UP
ORGANISM # SPEC MICROSCOPIC CNT: SIGNIFICANT CHANGE UP
ORGANISM # SPEC MICROSCOPIC CNT: SIGNIFICANT CHANGE UP
SPECIMEN SOURCE: SIGNIFICANT CHANGE UP

## 2020-09-09 LAB — SURGICAL PATHOLOGY STUDY: SIGNIFICANT CHANGE UP

## 2020-09-16 ENCOUNTER — OUTPATIENT (OUTPATIENT)
Dept: OUTPATIENT SERVICES | Facility: HOSPITAL | Age: 60
LOS: 1 days | End: 2020-09-16
Payer: MEDICAID

## 2020-09-16 ENCOUNTER — APPOINTMENT (OUTPATIENT)
Dept: PODIATRY | Facility: HOSPITAL | Age: 60
End: 2020-09-16
Payer: MEDICAID

## 2020-09-16 VITALS
WEIGHT: 150 LBS | DIASTOLIC BLOOD PRESSURE: 90 MMHG | HEIGHT: 64 IN | TEMPERATURE: 97.4 F | BODY MASS INDEX: 25.61 KG/M2 | HEART RATE: 98 BPM | SYSTOLIC BLOOD PRESSURE: 172 MMHG

## 2020-09-16 DIAGNOSIS — M79.609 PAIN IN UNSPECIFIED LIMB: ICD-10-CM

## 2020-09-16 DIAGNOSIS — Z09 ENCOUNTER FOR FOLLOW-UP EXAMINATION AFTER COMPLETED TREATMENT FOR CONDITIONS OTHER THAN MALIGNANT NEOPLASM: ICD-10-CM

## 2020-09-16 PROCEDURE — G0463: CPT

## 2020-09-16 PROCEDURE — 99212 OFFICE O/P EST SF 10 MIN: CPT

## 2020-09-16 RX ORDER — LOSARTAN POTASSIUM 100 MG/1
100 TABLET, FILM COATED ORAL DAILY
Refills: 0 | Status: ACTIVE | COMMUNITY
Start: 2020-09-16

## 2020-09-16 RX ORDER — AMLODIPINE BESYLATE 5 MG/1
5 TABLET ORAL DAILY
Refills: 0 | Status: ACTIVE | COMMUNITY
Start: 2020-09-16

## 2020-09-16 RX ORDER — LINAGLIPTIN 5 MG/1
5 TABLET, FILM COATED ORAL DAILY
Refills: 0 | Status: ACTIVE | COMMUNITY
Start: 2020-09-16

## 2020-09-16 RX ORDER — INSULIN GLARGINE 100 [IU]/ML
100 INJECTION, SOLUTION SUBCUTANEOUS
Refills: 0 | Status: ACTIVE | COMMUNITY
Start: 2020-09-16

## 2020-09-16 RX ORDER — METFORMIN HYDROCHLORIDE 1000 MG/1
1000 TABLET, COATED ORAL DAILY
Refills: 0 | Status: ACTIVE | COMMUNITY
Start: 2020-09-16

## 2020-09-16 RX ORDER — CLOPIDOGREL 75 MG/1
75 TABLET, FILM COATED ORAL DAILY
Refills: 0 | Status: ACTIVE | COMMUNITY
Start: 2020-09-16

## 2020-09-16 NOTE — ASSESSMENT
[FreeTextEntry1] : 58 yo f s/p left foot partial 2nd digit amputation, closed (DOS 9/02/20)\par \par -pt seen and evaluated\par - surgical site well coapted, sutures intact, no dehiscence, no drainage, no purulence, no signs of infection\par - applied DSD to left foot\par - keep dressing CDI till follow up \par - will remove sutures next visit\par - RTC 1 week

## 2020-09-16 NOTE — PHYSICAL EXAM
[1+] : left foot posterior tibialis 1+ [0] : right foot dorsalis pedis 0 [Pes Planus] : pes planus deformity [No Joint Swelling] : no joint swelling [] : no rash [Skin Color & Pigmentation] : normal skin color and pigmentation [Skin Lesions] : no skin lesions [Normal Appearance] : normal in appearance [Normal] : normal [No Focal Deficits] : no focal deficits [Sensation] : the sensory exam was normal to light touch and pinprick [Delayed in the Right Toes] : capillary refills normal in right toes [Delayed in the Left Toes] : capillary refills normal in the left toes [Erythema] : not erythematous [Normal in Appearance] : not normal in appearance [Tenderness] : not tender [Swelling] : not swollen [FreeTextEntry6] : 2nd toe

## 2020-09-16 NOTE — HISTORY OF PRESENT ILLNESS
[FreeTextEntry1] : 60 yo f presents to clinic initial evaluation s/p LF partial 2nd toe amp (DOS 09/02/20), pt admits to mild pain to the surgical\par  site, she is taking percocet which is helping w pain, she is ambulating using surgical shoe,pt has appointment with\par  vascular next week.  she denies any F/C/N/C/SOB. she denies any other pedal complaints.\par

## 2020-09-17 DIAGNOSIS — Z09 ENCOUNTER FOR FOLLOW-UP EXAMINATION AFTER COMPLETED TREATMENT FOR CONDITIONS OTHER THAN MALIGNANT NEOPLASM: ICD-10-CM

## 2020-09-17 DIAGNOSIS — E11.9 TYPE 2 DIABETES MELLITUS WITHOUT COMPLICATIONS: ICD-10-CM

## 2020-09-23 ENCOUNTER — OUTPATIENT (OUTPATIENT)
Dept: OUTPATIENT SERVICES | Facility: HOSPITAL | Age: 60
LOS: 1 days | End: 2020-09-23
Payer: MEDICAID

## 2020-09-23 ENCOUNTER — APPOINTMENT (OUTPATIENT)
Dept: PODIATRY | Facility: HOSPITAL | Age: 60
End: 2020-09-23
Payer: MEDICAID

## 2020-09-23 VITALS
HEART RATE: 106 BPM | WEIGHT: 150 LBS | BODY MASS INDEX: 25.61 KG/M2 | HEIGHT: 64 IN | SYSTOLIC BLOOD PRESSURE: 166 MMHG | TEMPERATURE: 96.9 F | DIASTOLIC BLOOD PRESSURE: 82 MMHG

## 2020-09-23 DIAGNOSIS — E11.9 TYPE 2 DIABETES MELLITUS W/OUT COMPLICATIONS: ICD-10-CM

## 2020-09-23 DIAGNOSIS — M79.609 PAIN IN UNSPECIFIED LIMB: ICD-10-CM

## 2020-09-23 DIAGNOSIS — E11.9 TYPE 2 DIABETES MELLITUS WITHOUT COMPLICATIONS: ICD-10-CM

## 2020-09-23 PROCEDURE — G0463: CPT

## 2020-09-23 PROCEDURE — 99212 OFFICE O/P EST SF 10 MIN: CPT

## 2020-09-23 RX ORDER — SIMVASTATIN 40 MG/1
40 TABLET, FILM COATED ORAL
Refills: 0 | Status: DISCONTINUED | COMMUNITY
Start: 2020-09-16 | End: 2020-09-23

## 2020-09-23 NOTE — HISTORY OF PRESENT ILLNESS
[FreeTextEntry1] : 58 yo f presents to clinic initial evaluation s/p LF partial 2nd toe amp (DOS 09/02/20), pt admits to mild pain to the surgical\par  site, she is taking percocet which is helping w pain, she is ambulating using surgical shoe. she denies any F/C/N/C/SOB. she denies any other pedal complaints.\par

## 2020-09-23 NOTE — PHYSICAL EXAM
[1+] : left foot posterior tibialis 1+ [0] : left foot dorsalis pedis 0 [No Joint Swelling] : no joint swelling [Pes Planus] : pes planus deformity [Skin Color & Pigmentation] : normal skin color and pigmentation [] : no rash [Skin Lesions] : no skin lesions [Normal Appearance] : normal in appearance [Normal] : normal [Sensation] : the sensory exam was normal to light touch and pinprick [No Focal Deficits] : no focal deficits [Delayed in the Right Toes] : capillary refills normal in right toes [Delayed in the Left Toes] : capillary refills normal in the left toes [Erythema] : not erythematous [Normal in Appearance] : not normal in appearance [Swelling] : not swollen [Tenderness] : not tender [FreeTextEntry1] : sutures intact w/ no dehiscence; no open lesions  [FreeTextEntry6] : 2nd toe

## 2020-09-23 NOTE — ASSESSMENT
[FreeTextEntry1] : 60 yo f s/p left foot partial 2nd digit amputation, closed (DOS 9/02/20)\par \par -pt seen and evaluated\par - surgical site well coapted, sutures intact, no dehiscence, no drainage, no purulence, no signs of infection\par - using aseptic technique, sutures removed w/ suture removal kit - revealing healthy amp site w/ no wound \par - RTC PRN

## 2020-09-24 ENCOUNTER — APPOINTMENT (OUTPATIENT)
Dept: VASCULAR SURGERY | Facility: CLINIC | Age: 60
End: 2020-09-24
Payer: MEDICAID

## 2020-09-24 VITALS
HEART RATE: 103 BPM | SYSTOLIC BLOOD PRESSURE: 149 MMHG | DIASTOLIC BLOOD PRESSURE: 86 MMHG | HEIGHT: 64 IN | TEMPERATURE: 98.6 F | WEIGHT: 150 LBS | BODY MASS INDEX: 25.61 KG/M2

## 2020-09-24 DIAGNOSIS — E78.5 HYPERLIPIDEMIA, UNSPECIFIED: ICD-10-CM

## 2020-09-24 PROCEDURE — 99214 OFFICE O/P EST MOD 30 MIN: CPT

## 2020-09-24 RX ORDER — ATORVASTATIN CALCIUM 20 MG/1
20 TABLET, FILM COATED ORAL
Refills: 0 | Status: ACTIVE | COMMUNITY
Start: 2020-09-23

## 2020-09-24 RX ORDER — CEPHALEXIN 500 MG/1
500 CAPSULE ORAL 4 TIMES DAILY
Refills: 0 | Status: DISCONTINUED | COMMUNITY
Start: 2020-09-16 | End: 2020-09-24

## 2020-09-24 NOTE — ASSESSMENT
[FreeTextEntry1] : NESTOR VARGAS is a 59 year old woman with PAD here for followup.\par \par 1. s/p toe amputation \par - doing well\par \par 2. PAD\par - Continued medical management of DM, HTN, HLD\par - Continue aspirin and plavix in setting of recent atherectomy and angioplasty\par \par 3. Follow up in six months for repeat JONAH w/ toe pressures, LLE arterial duplex

## 2020-09-24 NOTE — PHYSICAL EXAM
[JVD] : no jugular venous distention  [Normal Breath Sounds] : Normal breath sounds [Respiratory Effort] : normal respiratory effort [Normal Heart Sounds] : normal heart sounds [Normal Rate and Rhythm] : normal rate and rhythm [2+] : right 2+ [0] : left 0 [Ankle Swelling (On Exam)] : not present [Varicose Veins Of Lower Extremities] : not present [] : not present [No Rash or Lesion] : No rash or lesion [Calm] : calm [de-identified] : Well-appearing  [de-identified] : EOMI, anicteric [de-identified] : Supple [de-identified] : soft, nt, nd  [de-identified] : motor and sensation intact in all four extremities \par left second toe amputation site healing well without dehiscence. no surrounding erythema.  [de-identified] : A&Ox4

## 2020-09-24 NOTE — CONSULT LETTER
[Dear  ___] : Dear  [unfilled], [Consult Letter:] : I had the pleasure of evaluating your patient, [unfilled]. [Please see my note below.] : Please see my note below. [Consult Closing:] : Thank you very much for allowing me to participate in the care of this patient.  If you have any questions, please do not hesitate to contact me. [Sincerely,] : Sincerely, [FreeTextEntry1] : I met Ms. Booker during her hospitalization at [facility]. She underwent a left second toe amputation and is healing nicely. I will continue to see her for her peripheral arterial disease.  [FreeTextEntry3] : \par \par \par \par Odilia Forman MD, RPVI\par Division of Vascular & Endovascular Surgery\par [facility], Department of Surgery\par

## 2020-09-24 NOTE — HISTORY OF PRESENT ILLNESS
[FreeTextEntry1] : She presents w/ daughter, who is acting as . Patient is Kyrgyz-speaking.\par \par NESTOR VARGAS is a 59 year old woman who presents after left second toe partial amputation and seen during her hospitalization. She has a history of DM, HTN, HLD and had presented to [facility] for left second toe wound. She underwent LLE angiogram and angioplasty at that time. JONAH/PVRs performed during her hospitalization revealed left toe pressure of 55 and JONAH of 0.61. She underwent left second toe partial amputation 9/2/2020.\par \par Patient states doing well since surgery. She saw Dr. Talley yesterday and had sutures removed. She reports mild discomfort at the toe amp site when she is walking but otherwise is pain free. \par \par She is looking to establish vascular surgery care here at [facility]. \par \par + DM, HTN, HLD, PAD\par PCP is Dr. Coreen Sanches. \par Podiatrist is Dr. Talley.

## 2020-10-03 LAB
CULTURE RESULTS: SIGNIFICANT CHANGE UP
SPECIMEN SOURCE: SIGNIFICANT CHANGE UP

## 2020-12-08 ENCOUNTER — APPOINTMENT (OUTPATIENT)
Dept: ENDOCRINOLOGY | Facility: CLINIC | Age: 60
End: 2020-12-08

## 2020-12-28 NOTE — PROGRESS NOTE ADULT - NEGATIVE GENERAL GENITOURINARY SYMPTOMS
well developed, well nourished , in no acute distress , ambulating without difficulty , normal communication ability
no dysuria/no hematuria
no hematuria/no dysuria
no hematuria/no dysuria

## 2021-01-20 ENCOUNTER — APPOINTMENT (OUTPATIENT)
Dept: PODIATRY | Facility: HOSPITAL | Age: 61
End: 2021-01-20

## 2021-02-08 ENCOUNTER — APPOINTMENT (OUTPATIENT)
Dept: MAMMOGRAPHY | Facility: IMAGING CENTER | Age: 61
End: 2021-02-08

## 2021-02-18 ENCOUNTER — APPOINTMENT (OUTPATIENT)
Dept: VASCULAR SURGERY | Facility: CLINIC | Age: 61
End: 2021-02-18

## 2021-03-01 ENCOUNTER — TRANSCRIPTION ENCOUNTER (OUTPATIENT)
Age: 61
End: 2021-03-01

## 2021-03-01 RX ORDER — ASPIRIN 81 MG/1
81 TABLET ORAL DAILY
Qty: 90 | Refills: 1 | Status: ACTIVE | COMMUNITY
Start: 2020-12-03 | End: 1900-01-01

## 2021-03-01 RX ORDER — CLOPIDOGREL BISULFATE 75 MG/1
75 TABLET, FILM COATED ORAL DAILY
Qty: 90 | Refills: 0 | Status: ACTIVE | COMMUNITY
Start: 2020-12-03 | End: 1900-01-01

## 2021-03-04 ENCOUNTER — APPOINTMENT (OUTPATIENT)
Dept: VASCULAR SURGERY | Facility: CLINIC | Age: 61
End: 2021-03-04
Payer: MEDICAID

## 2021-03-04 VITALS
HEIGHT: 64 IN | BODY MASS INDEX: 25.61 KG/M2 | DIASTOLIC BLOOD PRESSURE: 85 MMHG | TEMPERATURE: 97.8 F | WEIGHT: 150 LBS | HEART RATE: 90 BPM | SYSTOLIC BLOOD PRESSURE: 159 MMHG

## 2021-03-04 DIAGNOSIS — I73.9 PERIPHERAL VASCULAR DISEASE, UNSPECIFIED: ICD-10-CM

## 2021-03-04 PROCEDURE — 93923 UPR/LXTR ART STDY 3+ LVLS: CPT

## 2021-03-04 PROCEDURE — 99213 OFFICE O/P EST LOW 20 MIN: CPT

## 2021-03-04 NOTE — HISTORY OF PRESENT ILLNESS
[FreeTextEntry1] : She presents w/ daughter, who is acting as . Patient is Belarusian-speaking. Daughter's name Keith Rodriguez. \par \par NESTOR VARGAS is a 59 year old woman who presents after left second toe partial amputation and seen during her hospitalization. She has a history of DM, HTN, HLD and had presented to [facility] for left second toe wound. She underwent LLE angiogram and angioplasty at that time. JONAH/PVRs performed during her hospitalization revealed left toe pressure of 55 and JONAH of 0.61. She underwent left second toe partial amputation 9/2/2020.\par \par Patient states doing well since surgery. She saw Dr. Talley yesterday and had sutures removed. She reports mild discomfort at the toe amp site when she is walking but otherwise is pain free. \par \par She is looking to establish vascular surgery care here at [facility]. \par \par + DM, HTN, HLD, PAD\par PCP is Dr. Coreen Sanches. \par Podiatrist is Dr. Talley. [de-identified] : 3/4/2021–patient presents with her daughter (Keith Rodriguez), who is acting as a  as needed.\par \par Since her last visit she denies any lower extremity pain.  She does report mild intermittent tenderness at the amputation site.  Her left second toe partial amputation site has completely healed.  She does not ambulate much but denies any pain with ambulation.

## 2021-03-04 NOTE — ASSESSMENT
[FreeTextEntry1] : NESTOR VARGAS is a 59 year old woman with PAD here for followup.\par \par > PAD\par –Continue medical management of DM, HTN, HLD\par –Continue aspirin.  Will discontinue Plavix as patient has been treated with Plavix for 3 months post procedure.\par \par > Follow-up in 6 months with repeat ABIs and left lower extremity arterial duplex.

## 2021-03-04 NOTE — PHYSICAL EXAM
[JVD] : no jugular venous distention  [Normal Breath Sounds] : Normal breath sounds [Respiratory Effort] : normal respiratory effort [Normal Heart Sounds] : normal heart sounds [Normal Rate and Rhythm] : normal rate and rhythm [2+] : right 2+ [0] : left 0 [Ankle Swelling (On Exam)] : not present [Varicose Veins Of Lower Extremities] : not present [] : not present [No Rash or Lesion] : No rash or lesion [Calm] : calm [de-identified] : Well-appearing  [de-identified] : EOMI, anicteric [de-identified] : Supple [de-identified] : soft, nt, nd  [de-identified] : motor and sensation intact in all four extremities \par left second toe amputation site well-healed [de-identified] : A&Ox4

## 2021-03-04 NOTE — DATA REVIEWED
[FreeTextEntry1] : 3/4/2021–JONAH/PVR\par Right JONAH 0.72, TBI 0.49, toe pressure 77\par Left JONAH 0.58, TBI 0.39, toe pressure 61

## 2021-04-01 ENCOUNTER — APPOINTMENT (OUTPATIENT)
Dept: VASCULAR SURGERY | Facility: CLINIC | Age: 61
End: 2021-04-01

## 2022-10-14 NOTE — PATIENT PROFILE ADULT - PATIENT REPRESENTATIVE: ( YOU CAN CHOOSE ANY PERSON THAT CAN ASSIST YOU WITH YOUR HEALTH CARE PREFERENCES, DOES NOT HAVE TO BE A SPOUSE, IMMEDIATE FAMILY OR SIGNIFICANT OTHER/PARTNER)
Patient would like password of '[id number]', do not give any information to anyone that does not know the password per patient and daughter.    declines

## 2022-11-03 NOTE — PROGRESS NOTE ADULT - PROBLEM/PLAN-6
DISPLAY PLAN FREE TEXT
Home
DISPLAY PLAN FREE TEXT
DISPLAY PLAN FREE TEXT